# Patient Record
Sex: MALE | Race: WHITE | NOT HISPANIC OR LATINO | Employment: OTHER | URBAN - METROPOLITAN AREA
[De-identification: names, ages, dates, MRNs, and addresses within clinical notes are randomized per-mention and may not be internally consistent; named-entity substitution may affect disease eponyms.]

---

## 2017-02-24 ENCOUNTER — MYC MEDICAL ADVICE (OUTPATIENT)
Dept: FAMILY MEDICINE | Facility: CLINIC | Age: 77
End: 2017-02-24

## 2017-02-24 DIAGNOSIS — I10 HYPERTENSION GOAL BP (BLOOD PRESSURE) < 130/80: ICD-10-CM

## 2017-02-24 DIAGNOSIS — M1A.9XX0 CHRONIC GOUT WITHOUT TOPHUS, UNSPECIFIED CAUSE, UNSPECIFIED SITE: ICD-10-CM

## 2017-02-24 DIAGNOSIS — E78.5 HYPERLIPIDEMIA LDL GOAL <100: ICD-10-CM

## 2017-02-24 RX ORDER — PRAVASTATIN SODIUM 80 MG/1
80 TABLET ORAL DAILY
Qty: 90 TABLET | Refills: 0 | Status: SHIPPED | OUTPATIENT
Start: 2017-02-24 | End: 2017-03-21

## 2017-02-24 RX ORDER — LISINOPRIL 40 MG/1
40 TABLET ORAL DAILY
Qty: 90 TABLET | Refills: 0 | Status: SHIPPED | OUTPATIENT
Start: 2017-02-24 | End: 2017-03-21

## 2017-02-24 RX ORDER — ALLOPURINOL 100 MG/1
TABLET ORAL
Qty: 180 TABLET | Refills: 0 | Status: SHIPPED | OUTPATIENT
Start: 2017-02-24 | End: 2017-03-21

## 2017-02-24 NOTE — TELEPHONE ENCOUNTER
pravastatin (PRAVACHOL) 80 MG tablet    Last Written Prescription Date: 8-26-16  Last Fill Quantity: 90, # refills: 1  Last Office Visit with [facility], [facility] or  Health prescribing provider: 3-18-16  Next 5 appointments (look out 90 days)     Mar 21, 2017 10:00 AM CDT   PHYSICAL with Gaby Baker MD   [facility] ([facility])    [address]  [place] [address]   [phone]                   Lab Results   Component Value Date    CHOL 141 01/15/2016     Lab Results   Component Value Date    HDL 45 01/15/2016     Lab Results   Component Value Date    LDL 61 01/15/2016     Lab Results   Component Value Date    TRIG 173 01/15/2016     Lab Results   Component Value Date    CHOLHDLRATIO 3.6 03/10/2015     lisinopril (PRINIVIL,ZESTRIL) 40 MG tablet      Last Written Prescription Date: 1-15-16  Last Fill Quantity: 90, # refills: 4  Last Office Visit with [facility], [facility] or  Health prescribing provider: 3-18-16  Next 5 appointments (look out 90 days)     Mar 21, 2017 10:00 AM CDT   PHYSICAL with Gaby Baker MD   [facility] ([facility])    [address]  [place] [address]   [phone]                   Potassium   Date Value Ref Range Status   01/15/2016 3.8 3.4 - 5.3 mmol/L Final     Creatinine   Date Value Ref Range Status   01/15/2016 1.07 0.66 - 1.25 mg/dL Final     BP Readings from Last 3 Encounters:   12/05/16 157/85   04/18/16 138/81   03/18/16 118/68     allopurinol (ZYLOPRIM) 100 MG tablet      Last Written Prescription Date: 1-15-16  Last Fill Quantity: 180, # refills: 4  Last Office Visit with [facility], [facility] or  Health prescribing provider:  3-18-16   Next 5 appointments (look out 90 days)     Mar 21, 2017 10:00 AM CDT   PHYSICAL with Gaby Baker MD   [facility] ([facility])    [address]  [place] [address]   [phone]                   Uric Acid   Date Value Ref Range Status   01/15/2016  4.4 3.5 - 7.2 mg/dL Final   ]  Creatinine   Date Value Ref Range Status   01/15/2016 1.07 0.66 - 1.25 mg/dL Final   ]  Lab Results   Component Value Date    WBC 14.3 04/30/2013     Lab Results   Component Value Date    RBC 3.82 04/30/2013     Lab Results   Component Value Date    HGB 11.6 05/01/2013     Lab Results   Component Value Date    HCT 36.0 04/30/2013     No components found for: MCT  Lab Results   Component Value Date    MCV 94 04/30/2013     Lab Results   Component Value Date    MCH 31.2 04/30/2013     Lab Results   Component Value Date    MCHC 33.1 04/30/2013     Lab Results   Component Value Date    RDW 12.9 04/30/2013     Lab Results   Component Value Date     04/30/2013     Lab Results   Component Value Date    AST 21 01/15/2016     Lab Results   Component Value Date    ALT 42 01/15/2016

## 2017-03-21 ENCOUNTER — OFFICE VISIT (OUTPATIENT)
Dept: FAMILY MEDICINE | Facility: CLINIC | Age: 77
End: 2017-03-21
Payer: COMMERCIAL

## 2017-03-21 VITALS
DIASTOLIC BLOOD PRESSURE: 80 MMHG | TEMPERATURE: 97.2 F | OXYGEN SATURATION: 94 % | SYSTOLIC BLOOD PRESSURE: 130 MMHG | WEIGHT: 218 LBS | HEART RATE: 100 BPM | HEIGHT: 67 IN | BODY MASS INDEX: 34.21 KG/M2

## 2017-03-21 DIAGNOSIS — Z13.89 SCREENING FOR DIABETIC PERIPHERAL NEUROPATHY: ICD-10-CM

## 2017-03-21 DIAGNOSIS — I10 HYPERTENSION GOAL BP (BLOOD PRESSURE) < 130/80: ICD-10-CM

## 2017-03-21 DIAGNOSIS — Z00.00 ROUTINE GENERAL MEDICAL EXAMINATION AT A HEALTH CARE FACILITY: Primary | ICD-10-CM

## 2017-03-21 DIAGNOSIS — M1A.9XX0 CHRONIC GOUT WITHOUT TOPHUS, UNSPECIFIED CAUSE, UNSPECIFIED SITE: ICD-10-CM

## 2017-03-21 DIAGNOSIS — L30.9 DERMATITIS: ICD-10-CM

## 2017-03-21 DIAGNOSIS — E78.5 HYPERLIPIDEMIA LDL GOAL <100: ICD-10-CM

## 2017-03-21 DIAGNOSIS — I10 HTN, GOAL BELOW 130/80: ICD-10-CM

## 2017-03-21 DIAGNOSIS — E11.9 TYPE 2 DIABETES MELLITUS WITHOUT COMPLICATION, WITHOUT LONG-TERM CURRENT USE OF INSULIN (H): ICD-10-CM

## 2017-03-21 DIAGNOSIS — Z71.89 ADVANCED DIRECTIVES, COUNSELING/DISCUSSION: ICD-10-CM

## 2017-03-21 LAB
ALT SERPL W P-5'-P-CCNC: 23 U/L (ref 0–70)
ANION GAP SERPL CALCULATED.3IONS-SCNC: 6 MMOL/L (ref 3–14)
AST SERPL W P-5'-P-CCNC: 17 U/L (ref 0–45)
BUN SERPL-MCNC: 17 MG/DL (ref 7–30)
CALCIUM SERPL-MCNC: 10.4 MG/DL (ref 8.5–10.1)
CHLORIDE SERPL-SCNC: 98 MMOL/L (ref 94–109)
CHOLEST SERPL-MCNC: 158 MG/DL
CK SERPL-CCNC: 94 U/L (ref 30–300)
CO2 SERPL-SCNC: 32 MMOL/L (ref 20–32)
CREAT SERPL-MCNC: 1.04 MG/DL (ref 0.66–1.25)
CREAT UR-MCNC: 228 MG/DL
GFR SERPL CREATININE-BSD FRML MDRD: 69 ML/MIN/1.7M2
GLUCOSE SERPL-MCNC: 134 MG/DL (ref 70–99)
HBA1C MFR BLD: 6.4 % (ref 4.3–6)
HDLC SERPL-MCNC: 57 MG/DL
LDLC SERPL CALC-MCNC: 74 MG/DL
MICROALBUMIN UR-MCNC: 613 MG/L
MICROALBUMIN/CREAT UR: 268.86 MG/G CR (ref 0–17)
NONHDLC SERPL-MCNC: 101 MG/DL
POTASSIUM SERPL-SCNC: 3.8 MMOL/L (ref 3.4–5.3)
SODIUM SERPL-SCNC: 136 MMOL/L (ref 133–144)
TRIGL SERPL-MCNC: 136 MG/DL
URATE SERPL-MCNC: 4.4 MG/DL (ref 3.5–7.2)

## 2017-03-21 PROCEDURE — 80048 BASIC METABOLIC PNL TOTAL CA: CPT | Performed by: INTERNAL MEDICINE

## 2017-03-21 PROCEDURE — 82043 UR ALBUMIN QUANTITATIVE: CPT | Performed by: INTERNAL MEDICINE

## 2017-03-21 PROCEDURE — 80061 LIPID PANEL: CPT | Performed by: INTERNAL MEDICINE

## 2017-03-21 PROCEDURE — 99207 C FOOT EXAM  NO CHARGE: CPT | Mod: 25 | Performed by: INTERNAL MEDICINE

## 2017-03-21 PROCEDURE — 84550 ASSAY OF BLOOD/URIC ACID: CPT | Performed by: INTERNAL MEDICINE

## 2017-03-21 PROCEDURE — 82550 ASSAY OF CK (CPK): CPT | Performed by: INTERNAL MEDICINE

## 2017-03-21 PROCEDURE — 84460 ALANINE AMINO (ALT) (SGPT): CPT | Performed by: INTERNAL MEDICINE

## 2017-03-21 PROCEDURE — 84450 TRANSFERASE (AST) (SGOT): CPT | Performed by: INTERNAL MEDICINE

## 2017-03-21 PROCEDURE — 36415 COLL VENOUS BLD VENIPUNCTURE: CPT | Performed by: INTERNAL MEDICINE

## 2017-03-21 PROCEDURE — 83036 HEMOGLOBIN GLYCOSYLATED A1C: CPT | Performed by: INTERNAL MEDICINE

## 2017-03-21 PROCEDURE — 99397 PER PM REEVAL EST PAT 65+ YR: CPT | Performed by: INTERNAL MEDICINE

## 2017-03-21 RX ORDER — CHLORTHALIDONE 25 MG/1
25 TABLET ORAL DAILY
Qty: 90 TABLET | Refills: 4 | Status: SHIPPED | OUTPATIENT
Start: 2017-03-21 | End: 2018-04-11

## 2017-03-21 RX ORDER — ALLOPURINOL 100 MG/1
TABLET ORAL
Qty: 180 TABLET | Refills: 3 | Status: SHIPPED | OUTPATIENT
Start: 2017-03-21 | End: 2018-04-11

## 2017-03-21 RX ORDER — MOMETASONE FUROATE 1 MG/G
CREAM TOPICAL
Qty: 45 G | Refills: 3 | Status: SHIPPED | OUTPATIENT
Start: 2017-03-21 | End: 2021-03-03

## 2017-03-21 RX ORDER — PRAVASTATIN SODIUM 80 MG/1
80 TABLET ORAL DAILY
Qty: 90 TABLET | Refills: 3 | Status: SHIPPED | OUTPATIENT
Start: 2017-03-21 | End: 2018-04-11

## 2017-03-21 RX ORDER — LISINOPRIL 40 MG/1
40 TABLET ORAL DAILY
Qty: 90 TABLET | Refills: 3 | Status: SHIPPED | OUTPATIENT
Start: 2017-03-21 | End: 2018-04-11

## 2017-03-21 RX ORDER — AMMONIUM LACTATE 12 G/100G
CREAM TOPICAL 2 TIMES DAILY PRN
Qty: 385 G | Refills: 3 | Status: SHIPPED | OUTPATIENT
Start: 2017-03-21 | End: 2021-03-03

## 2017-03-21 ASSESSMENT — PAIN SCALES - GENERAL: PAINLEVEL: NO PAIN (0)

## 2017-03-21 NOTE — PROGRESS NOTES
INTERNAL MEDICINE   SUBJECTIVE:                                                            Zoey Romo is a 77 year old male who presents for Preventive Visit.      Are you in the first 12 months of your Medicare Part B coverage?  No    Healthy Habits:    Do you get at least three servings of calcium containing foods daily (dairy, green leafy vegetables, etc.)? yes    Amount of exercise or daily activities, outside of work: 3 day(s) per week    Problems taking medications regularly No    Medication side effects: No    Have you had an eye exam in the past two years? no    Do you see a dentist twice per year? yes    Do you have sleep apnea, excessive snoring or daytime drowsiness?yes, sleep apnea. Wants to discuss CPAP    COGNITIVE SCREEN  1) Repeat 3 items (Banana, Sunrise, Chair)  2) Clock draw: NORMAL  3) 3 item recall: Recalls 3 objects  Results: 3 items recalled: COGNITIVE IMPAIRMENT LESS LIKELY    Mini-CogTM Copyright S Farrukh. Licensed by the author for use in [facility]; reprinted with permission (ashu@[facility]). All rights reserved.            Chief Complaint   Patient presents with     Physical     No concerns. Requesting prescription for dry skin. Patient is fasting.         Reviewed and updated as needed this visit by clinical staff  Tobacco  Allergies  Meds  Med Hx  Surg Hx  Fam Hx  Soc Hx        Reviewed and updated as needed this visit by Provider        Social History   Substance Use Topics     Smoking status: Former Smoker     Packs/day: 0.50     Years: 10.00     Types: Cigars     Quit date: 1/20/1985     Smokeless tobacco: Never Used     Alcohol use 3.5 oz/week      Comment: 2 per month       The patient does not drink >3 drinks per day nor >7 drinks per week.    Today's PHQ-2 Score:   PHQ-2 ( 1999 [employer]) 1/15/2016 3/10/2015   Q1: Little interest or pleasure in doing things 0 0   Q2: Feeling down, depressed or hopeless 0 0   PHQ-2 Score 0 0       Do you feel safe in your  environment - Yes    Do you have a Health Care Directive?: No: Advance care planning reviewed with patient; information given to patient to review.    Current providers sharing in care for this patient include:   Patient Care Team:  Gaby Baker MD as PCP - General      Hearing impairment: No    Ability to successfully perform activities of daily living: Yes, no assistance needed     Fall risk:       Home safety:  none identified      The following health maintenance items are reviewed in Epic and correct as of today:  Health Maintenance   Topic Date Due     FOOT EXAM Q1 YEAR( NO INBASKET)  03/10/2016     FALL RISK ASSESSMENT  03/10/2016     EYE EXAM Q1 YEAR( NO INBASKET)  06/16/2016     A1C Q6 MO( NO INBASKET)  11/10/2016     BMP Q1 YR (NO INBASKET)  01/15/2017     LIPID MONITORING Q1 YEAR( NO INBASKET)  01/15/2017     MICROALBUMIN Q1 YEAR( NO INBASKET)  03/18/2017     INFLUENZA VACCINE (SYSTEM ASSIGNED)  09/01/2017     TSH W/ FREE T4 REFLEX Q2 YEAR (NO INBASKET)  01/15/2018     COLONOSCOPY Q3 YR INBASKET MESSAGE  05/03/2019     ADVANCE DIRECTIVE PLANNING Q5 YRS (NO INBASKET)  03/18/2021     TETANUS IMMUNIZATION (SYSTEM ASSIGNED)  02/01/2023     PNEUMOCOCCAL  Completed       Additional Notes: He does not have any health concerns. He was evaluated by cardiology for his AAA, which is stable. In the morning his blood sugars are around 120. This tends to go down throughout the day. He is still not interested in getting the shingles vaccine. He would like to get some more of the salve he was using for the rash on the skin on his ankles       ROS:  C: NEGATIVE for fever, chills, change in weight  I: NEGATIVE for worrisome rashes, moles or lesions  CV: NEGATIVE for chest pain, palpitations or peripheral edema  M: NEGATIVE for significant arthralgias or myalgia  P: NEGATIVE for changes in mood or affect  All other systems reviewed and were negative.      This document serves as a record of the services and decisions  "personally performed and made by Gaby Bkaer MD. It was created on his/her behalf by Hermila Ontiveros, a trained medical scribe. The creation of this document is based the provider's statements to the medical scribe.    Fern Ontiveros 10:16 AM, March 21, 2017    Problem list, Medication list, Allergies, and Medical/Social/Surgical histories reviewed in [facility] and updated as appropriate.  Labs reviewed in EPIC  OBJECTIVE:                                                            /80 (BP Location: Right arm, Cuff Size: Adult Regular)  Pulse 100  Temp 97.2  F (36.2  C) (Oral)  Ht 5' 7.25\" (1.708 m)  Wt 218 lb (98.9 kg)  SpO2 94%  BMI 33.89 kg/m2 Estimated body mass index is 33.89 kg/(m^2) as calculated from the following:    Height as of this encounter: 5' 7.25\" (1.708 m).    Weight as of this encounter: 218 lb (98.9 kg).  EXAM:   GENERAL: healthy, alert and no distress  HENT: ear canals and TM's normal, nose and mouth without ulcers or lesions  NECK: no adenopathy, no asymmetry, masses, or scars and thyroid normal to palpation  RESP: lungs clear to auscultation - no rales, rhonchi or wheezes  CV: regular rate and rhythm, normal S1 S2, no S3 or S4, no murmur, click or rub, no peripheral edema and peripheral pulses strong  ABDOMEN: soft, nontender, no hepatosplenomegaly, no masses and bowel sounds normal, diastasis recti   MS: no gross musculoskeletal defects noted, no edema  SKIN: no suspicious lesions or rashes, dry scaly legs bilaterally.   PSYCH: mentation appears normal, affect normal/bright  DIABETIC FOOT EXAM: Normal sensation bilaterally with no visible ulcerations and intact circulation     ASSESSMENT / PLAN:                                                            I spent 19 minutes of time with the patient and >50% of it was in education and counseling regarding preventive healthcare.     1. Chronic gout without tophus, unspecified cause, unspecified site   The current medical regimen is " effective; continue present plan and medications    - allopurinol (ZYLOPRIM) 100 MG tablet; TAKE 1 TABLET (100 MG) BY MOUTH 2 TIMES DAILY  Dispense: 180 tablet; Refill: 3  - Uric acid    2. HTN, goal below 130/80   The current medical regimen is effective; continue present plan and medications    - diltiazem HCl COATED BEADS (CARDIZEM LA) 240 MG TB24; Take 240 mg by mouth daily  Dispense: 90 tablet; Refill: 4  - chlorthalidone (HYGROTON) 25 MG tablet; Take 1 tablet (25 mg) by mouth daily  Dispense: 90 tablet; Refill: 4  - BASIC METABOLIC PANEL    3. Hypertension goal BP (blood pressure) < 130/80   The current medical regimen is effective; continue present plan and medications    - lisinopril (PRINIVIL/ZESTRIL) 40 MG tablet; Take 1 tablet (40 mg) by mouth daily  Dispense: 90 tablet; Refill: 3    4. Hyperlipidemia LDL goal <100   The current medical regimen is effective; continue present plan and medications    - pravastatin (PRAVACHOL) 80 MG tablet; Take 1 tablet (80 mg) by mouth daily  Dispense: 90 tablet; Refill: 3  - Lipid panel reflex to direct LDL  - AST  - ALT  - CK total    5. Screening for diabetic peripheral neuropathy   Normal.   - FOOT EXAM  NO CHARGE [32126.114]    6. Routine general medical examination at a health care facility   Performed today in clinic     7. Advanced directives, counseling/discussion   Discussed with patient. He will attend session with his wife to complete advanced directive.   - HONORING CHOICES REFERRAL    8. Type 2 diabetes mellitus without complication, without long-term current use of insulin (H)   The current medical regimen is effective; continue present plan and medications    - metFORMIN (GLUCOPHAGE) 1000 MG tablet; Take 1 tablet (1,000 mg) by mouth 2 times daily (with meals)  Dispense: 180 tablet; Refill: 3  - blood glucose monitoring (GOOD CONTOUR) test strip; 1 strip by In Vitro route daily Use to test blood sugars  daily or as directed.  Dispense: 1 Box; Refill: 11  -  "HEMOGLOBIN A1C  - Albumin Random Urine Quantitative    9. Dermatitis   The current medical regimen is effective; continue present plan and medications    - mometasone (ELOCON) 0.1 % cream; Apply sparingly to affected area twice daily as needed.  Do not apply to face.  Dispense: 45 g; Refill: 3  - ammonium lactate (LAC-HYDRIN) 12 % cream; Apply topically 2 times daily as needed for dry skin  Dispense: 385 g; Refill: 3    End of Life Planning:  Patient currently has an advanced directive: No.  I have verified the patient's ablity to prepare an advanced directive/make health care decisions.  Literature was provided to assist patient in preparing an advanced directive.    COUNSELING:  Reviewed preventive health counseling, as reflected in patient instructions       Preparing Advanced Directive.   Estimated body mass index is 33.89 kg/(m^2) as calculated from the following:    Height as of this encounter: 5' 7.25\" (1.708 m).    Weight as of this encounter: 218 lb (98.9 kg).  Weight management plan: Discussed healthy diet and exercise guidelines and patient will follow up in 12 months in clinic to re-evaluate.   reports that he quit smoking about 32 years ago. His smoking use included Cigars. He has a 5.00 pack-year smoking history. He has never used smokeless tobacco.      Appropriate preventive services were discussed with this patient, including applicable screening as appropriate for cardiovascular disease, diabetes, osteopenia/osteoporosis, and glaucoma.  As appropriate for age/gender, discussed screening for colorectal cancer, prostate cancer, breast cancer, and cervical cancer. Checklist reviewing preventive services available has been given to the patient.    Reviewed patients plan of care and provided an AVS. The Basic Care Plan (routine screening as documented in Health Maintenance) for Zoey meets the Care Plan requirement. This Care Plan has been established and reviewed with the Patient.    Counseling " Resources:  ATP IV Guidelines  Pooled Cohorts Equation Calculator  Breast Cancer Risk Calculator  FRAX Risk Assessment  ICSI Preventive Guidelines  Dietary Guidelines for Americans, 2010  USDA's MyPlate  ASA Prophylaxis  Lung CA Screening    Patient Instructions   - Honoring Choices is a free resource to help you organize your Advanced Directive.   - Start using urea cream on legs after you get out of the shower for dry skin. Use the Elocon cream twice daily as needed for when the rash gets severe.  - Schedule next appointment me in 6 months.       The information in this document, created by the medical scribe for me, accurately reflects the services I personally performed and the decisions made by me. I have reviewed and approved this document for accuracy prior to leaving the patient care area.  Gaby Baker MD  10:19 AM, 03/21/17    Gaby Baker MD  [facility]    Start: 10:19 AM   End: 10:38 AM

## 2017-03-21 NOTE — PATIENT INSTRUCTIONS
- Honoring Choices is a free resource to help you organize your Advanced Directive.     - Start using urea cream on legs after you get out of the shower for dry skin. Use the Elocon cream twice daily as needed for when the rash gets severe.    - Schedule next appointment me in 6 months.       Preventive Health Recommendations:   Male Ages 65 and over    Yearly exam:             See your health care provider every year in order to  o   Review health changes.   o   Discuss preventive care.    o   Review your medicines if your doctor has prescribed any.    Talk with your health care provider about whether you should have a test to screen for prostate cancer (PSA).    Every 3 years, have a diabetes test (fasting glucose). If you are at risk for diabetes, you should have this test more often.    Every 5 years, have a cholesterol test. Have this test more often if you are at risk for high cholesterol or heart disease.     Every 10 years, have a colonoscopy. Or, have a yearly FIT test (stool test). These exams will check for colon cancer.    Talk to with your health care provider about screening for Abdominal Aortic Aneurysm if you have a family history of AAA or have a history of smoking.    Shots:     Get a flu shot each year.     Get a tetanus shot every 10 years.     Talk to your doctor about your pneumonia vaccines. There are now two you should receive - Pneumovax (PPSV 23) and Prevnar (PCV 13).     Talk to your doctor about a shingles vaccine.     Talk to your doctor about the hepatitis B vaccine.  Nutrition:     Eat at least 5 servings of fruits and vegetables each day.     Eat whole-grain bread, whole-wheat pasta and brown rice instead of white grains and rice.     Talk to your provider about Calcium and Vitamin D.   Lifestyle    Exercise for at least 150 minutes a week (30 minutes a day, 5 days a week). This will help you control your weight and prevent disease.     Limit alcohol to one drink per day.     No  smoking.     Wear sunscreen to prevent skin cancer.     See your dentist every six months for an exam and cleaning.     See your eye doctor every 1 to 2 years to screen for conditions such as glaucoma, macular degeneration, cataracts, etc       [facility]    If you have any questions regarding to your visit please contact your care team:     Team Pink:   Clinic Hours Telephone Number   Internal Medicine:  Dr. Gaby Denise, NP       7am-7pm  Monday - Thursday   7am-5pm  Fridays  [phone]  (Appointment scheduling available 24/7)    Questions about your visit?  Team Line  [phone]   Urgent Care - [place] and [place] - 11am-9pm Monday-Friday Saturday-Sunday- 9am-5pm   [place] - 5pm-9pm Monday-Friday Saturday-Sunday- 9am-5pm  [phone] - [place]   [phone] - [place]       What options do I have for visits at the clinic other than the traditional office visit?  To expand how we care for you, many of our providers are utilizing electronic visits (e-visits) and telephone visits, when medically appropriate, for interactions with their patients rather than a visit in the clinic.   We also offer nurse visits for many medical concerns. Just like any other service, we will bill your insurance company for this type of visit based on time spent on the phone with your provider. Not all insurance companies cover these visits. Please check with your medical insurance if this type of visit is covered. You will be responsible for any charges that are not paid by your insurance.      E-visits via Flywheel:  generally incur a $35.00 fee.  Telephone visits:  Time spent on the phone: *charged based on time that is spent on the phone in increments of 10 minutes. Estimated cost:   5-10 mins $30.00   11-20 mins. $59.00   21-30 mins. $85.00   Use Flywheel (secure email communication and access to your chart) to send your primary care provider a message  or make an appointment. Ask someone on your Team how to sign up for [employer].    For a Price Quote for your services, please call our Consumer Price Line at [phone].    As always, Thank you for trusting us with your health care needs!    Discharged by Suze RAM CMA ([facility])

## 2017-03-21 NOTE — MR AVS SNAPSHOT
After Visit Summary   3/21/2017    Zoey Romo    MRN: [id number]           Patient Information     Date Of Birth          1940        Visit Information        Provider Department      3/21/2017 10:00 AM Gaby Baker MD [facility]        Today's Diagnoses     Routine general medical examination at a health care facility    -  1    Chronic gout without tophus, unspecified cause, unspecified site        HTN, goal below 130/80        Hypertension goal BP (blood pressure) < 130/80        Hyperlipidemia LDL goal <100        Screening for diabetic peripheral neuropathy        Advanced directives, counseling/discussion        Type 2 diabetes mellitus without complication, without long-term current use of insulin (H)        Dermatitis          Care Instructions    - Honoring Choices is a free resource to help you organize your Advanced Directive.     - Start using urea cream on legs after you get out of the shower for dry skin. Use the Elocon cream twice daily as needed for when the rash gets severe.    - Schedule next appointment me in 6 months.       Preventive Health Recommendations:   Male Ages 65 and over    Yearly exam:             See your health care provider every year in order to  o   Review health changes.   o   Discuss preventive care.    o   Review your medicines if your doctor has prescribed any.    Talk with your health care provider about whether you should have a test to screen for prostate cancer (PSA).    Every 3 years, have a diabetes test (fasting glucose). If you are at risk for diabetes, you should have this test more often.    Every 5 years, have a cholesterol test. Have this test more often if you are at risk for high cholesterol or heart disease.     Every 10 years, have a colonoscopy. Or, have a yearly FIT test (stool test). These exams will check for colon cancer.    Talk to with your health care provider about screening for Abdominal Aortic Aneurysm if  you have a family history of AAA or have a history of smoking.    Shots:     Get a flu shot each year.     Get a tetanus shot every 10 years.     Talk to your doctor about your pneumonia vaccines. There are now two you should receive - Pneumovax (PPSV 23) and Prevnar (PCV 13).     Talk to your doctor about a shingles vaccine.     Talk to your doctor about the hepatitis B vaccine.  Nutrition:     Eat at least 5 servings of fruits and vegetables each day.     Eat whole-grain bread, whole-wheat pasta and brown rice instead of white grains and rice.     Talk to your provider about Calcium and Vitamin D.   Lifestyle    Exercise for at least 150 minutes a week (30 minutes a day, 5 days a week). This will help you control your weight and prevent disease.     Limit alcohol to one drink per day.     No smoking.     Wear sunscreen to prevent skin cancer.     See your dentist every six months for an exam and cleaning.     See your eye doctor every 1 to 2 years to screen for conditions such as glaucoma, macular degeneration, cataracts, etc       [facility]    If you have any questions regarding to your visit please contact your care team:     Team Pink:   Clinic Hours Telephone Number   Internal Medicine:  Dr. Gaby Denise, NP       7am-7pm  Monday - Thursday   7am-5pm  Fridays  [phone]  (Appointment scheduling available 24/7)    Questions about your visit?  Team Line  [phone]   Urgent Care - [place] - 11am-9pm Monday-Friday Saturday-Sunday- 9am-5pm   [place] - 5pm-9pm Monday-Friday Saturday-Sunday- 9am-5pm  [phone] - [place]   [phone] - [place]       What options do I have for visits at the clinic other than the traditional office visit?  To expand how we care for you, many of our providers are utilizing electronic visits (e-visits) and telephone visits, when medically appropriate, for interactions with their patients  rather than a visit in the clinic.   We also offer nurse visits for many medical concerns. Just like any other service, we will bill your insurance company for this type of visit based on time spent on the phone with your provider. Not all insurance companies cover these visits. Please check with your medical insurance if this type of visit is covered. You will be responsible for any charges that are not paid by your insurance.      E-visits via YouWebhart:  generally incur a $35.00 fee.  Telephone visits:  Time spent on the phone: *charged based on time that is spent on the phone in increments of 10 minutes. Estimated cost:   5-10 mins $30.00   11-20 mins. $59.00   21-30 mins. $85.00   Use ADOMIC (formerly YieldMetrics) (secure email communication and access to your chart) to send your primary care provider a message or make an appointment. Ask someone on your Team how to sign up for ADOMIC (formerly YieldMetrics).    For a Price Quote for your services, please call our [employer] Line at [phone].    As always, Thank you for trusting us with your health care needs!    Discharged by Suze RAM CMA ([facility])          Follow-ups after your visit        Additional Services     HONORING CHOICES REFERRAL       Your provider has referred you to Advance Directive Counseling with our [facility] [employer] Program.    Reason for Referral: Facilitate General Advance Care Planning    The [facility]oring [employer] Representative will call you to schedule your appointment, unless your appointment was already scheduled at your clinic.  You may also call the Peer[employer]ing [employer] [facility] Representative at [phone] to schedule your appointment.                  Who to contact     If you have questions or need follow up information about today's clinic visit or your schedule please contact [facility] directly at [phone].  Normal or non-critical lab and imaging results will be communicated to you by MyChart, letter or phone within 4 business days after the clinic has received  "the results. If you do not hear from us within 7 days, please contact the clinic through Neomatrix or phone. If you have a critical or abnormal lab result, we will notify you by phone as soon as possible.  Submit refill requests through Neomatrix or call your pharmacy and they will forward the refill request to us. Please allow 3 business days for your refill to be completed.          Additional Information About Your Visit        The [employer] Information     Neomatrix gives you secure access to your electronic health record. If you see a primary care provider, you can also send messages to your care team and make appointments. If you have questions, please call your primary care clinic.  If you do not have a primary care provider, please call [phone] and they will assist you.        Care EveryWhere ID     This is your Care EveryWhere ID. This could be used by other organizations to access your [place] medical records  [id number]        Your Vitals Were     Pulse Temperature Height Pulse Oximetry BMI (Body Mass Index)       100 97.2  F (36.2  C) (Oral) 5' 7.25\" (1.708 m) 94% 33.89 kg/m2        Blood Pressure from Last 3 Encounters:   03/21/17 130/80   12/05/16 157/85   04/18/16 138/81    Weight from Last 3 Encounters:   03/21/17 218 lb (98.9 kg)   12/05/16 223 lb (101.2 kg)   04/18/16 224 lb (101.6 kg)              We Performed the Following     Albumin Random Urine Quantitative     ALT     AST     BASIC METABOLIC PANEL     CK total     FOOT EXAM  NO CHARGE [37413.114]     HEMOGLOBIN A1C     HONORING CHOICES REFERRAL     Lipid panel reflex to direct LDL     Uric acid          Today's Medication Changes          These changes are accurate as of: 3/21/17 10:43 AM.  If you have any questions, ask your nurse or doctor.               Start taking these medicines.        Dose/Directions    ammonium lactate 12 % cream   Commonly known as:  LAC-HYDRIN   Used for:  Dermatitis   Started by:  Gaby Baker MD        Apply " topically 2 times daily as needed for dry skin   Quantity:  385 g   Refills:  3       mometasone 0.1 % cream   Commonly known as:  ELOCON   Used for:  Dermatitis   Started by:  Gaby Baker MD        Apply sparingly to affected area twice daily as needed.  Do not apply to face.   Quantity:  45 g   Refills:  3            Where to get your medicines      These medications were sent to [facility] - [address]  [address] [place] [id number]     Phone:  [phone]     allopurinol 100 MG tablet    ammonium lactate 12 % cream    blood glucose monitoring test strip    chlorthalidone 25 MG tablet    diltiazem HCl COATED BEADS 240 MG Tb24    lisinopril 40 MG tablet    metFORMIN 1000 MG tablet    mometasone 0.1 % cream    pravastatin 80 MG tablet                Primary Care Provider Office Phone # Fax #    Gaby Baker [phone]       [address] [address]        Thank you!     Thank you for choosing [facility]  for your care. Our goal is always to provide you with excellent care. Hearing back from our patients is one way we can continue to improve our services. Please take a few minutes to complete the written survey that you may receive in the mail after your visit with us. Thank you!             Your Updated Medication List - Protect others around you: Learn how to safely use, store and throw away your medicines at www.disposemymeds.org.          This list is accurate as of: 3/21/17 10:43 AM.  Always use your most recent med list.                   Brand Name Dispense Instructions for use    allopurinol 100 MG tablet    ZYLOPRIM    180 tablet    TAKE 1 TABLET (100 MG) BY MOUTH 2 TIMES DAILY       ammonium lactate 12 % cream    LAC-HYDRIN    385 g    Apply topically 2 times daily as needed for dry skin       aspirin 81 MG tablet      Take 1 tablet by mouth daily.       blood glucose  monitoring test strip    GOOD CONTOUR    1 Box    1 strip by In Vitro route daily Use to test blood sugars  daily or as directed.       CALTRATE 600 + D 600-200 MG-IU Tabs      1 TABLET DAILY       chlorthalidone 25 MG tablet    HYGROTON    90 tablet    Take 1 tablet (25 mg) by mouth daily       diltiazem HCl COATED BEADS 240 MG Tb24    CARDIZEM LA    90 tablet    Take 240 mg by mouth daily       GLUCOSAMINE CHONDROITIN COMPLX PO          lisinopril 40 MG tablet    PRINIVIL/ZESTRIL    90 tablet    Take 1 tablet (40 mg) by mouth daily       metFORMIN 1000 MG tablet    GLUCOPHAGE    180 tablet    Take 1 tablet (1,000 mg) by mouth 2 times daily (with meals)       mometasone 0.1 % cream    ELOCON    45 g    Apply sparingly to affected area twice daily as needed.  Do not apply to face.       pravastatin 80 MG tablet    PRAVACHOL    90 tablet    Take 1 tablet (80 mg) by mouth daily

## 2017-03-21 NOTE — PROGRESS NOTES
There is more protein in the urine but you are taking the treatment (Lisinopril).  I don't feel a higher dose would be of any help.  I will recheck this in 6 months when I see you back.  Good 3 month sugar average.   Normal electrolytes. Normal kidney function. Good cholesterol. Normal muscle test. Normal uric acid. Normal liver blood test.

## 2017-03-21 NOTE — NURSING NOTE
"Chief Complaint   Patient presents with     Physical     No concerns. Requesting prescription for dry skin. Patient is fasting.       Initial /80 (BP Location: Right arm, Cuff Size: Adult Regular)  Pulse 100  Temp 97.2  F (36.2  C) (Oral)  Ht 5' 7.25\" (1.708 m)  Wt 218 lb (98.9 kg)  SpO2 94%  BMI 33.89 kg/m2 Estimated body mass index is 33.89 kg/(m^2) as calculated from the following:    Height as of this encounter: 5' 7.25\" (1.708 m).    Weight as of this encounter: 218 lb (98.9 kg).  Medication Reconciliation: complete       Anita Polanco CMA      "

## 2017-03-22 ASSESSMENT — PATIENT HEALTH QUESTIONNAIRE - PHQ9: SUM OF ALL RESPONSES TO PHQ QUESTIONS 1-9: 0

## 2017-05-03 ENCOUNTER — OFFICE VISIT (OUTPATIENT)
Dept: OPHTHALMOLOGY | Facility: CLINIC | Age: 77
End: 2017-05-03
Payer: COMMERCIAL

## 2017-05-03 DIAGNOSIS — Z01.01 ENCOUNTER FOR EXAMINATION OF EYES AND VISION WITH ABNORMAL FINDINGS: Primary | ICD-10-CM

## 2017-05-03 DIAGNOSIS — H02.839 DERMATOCHALASIS, UNSPECIFIED LATERALITY: ICD-10-CM

## 2017-05-03 DIAGNOSIS — H26.9 CATARACT: ICD-10-CM

## 2017-05-03 DIAGNOSIS — H52.4 PRESBYOPIA: ICD-10-CM

## 2017-05-03 DIAGNOSIS — E11.9 TYPE 2 DIABETES MELLITUS WITHOUT COMPLICATION, WITHOUT LONG-TERM CURRENT USE OF INSULIN (H): ICD-10-CM

## 2017-05-03 PROCEDURE — 92015 DETERMINE REFRACTIVE STATE: CPT | Performed by: STUDENT IN AN ORGANIZED HEALTH CARE EDUCATION/TRAINING PROGRAM

## 2017-05-03 PROCEDURE — 92014 COMPRE OPH EXAM EST PT 1/>: CPT | Performed by: STUDENT IN AN ORGANIZED HEALTH CARE EDUCATION/TRAINING PROGRAM

## 2017-05-03 ASSESSMENT — TONOMETRY
IOP_METHOD: APPLANATION
OD_IOP_MMHG: 20
OS_IOP_MMHG: 18

## 2017-05-03 ASSESSMENT — REFRACTION_WEARINGRX
SPECS_TYPE: OTC READERS
OS_SPHERE: +2.50
OS_CYLINDER: SPHERE
OD_SPHERE: +2.50
OD_CYLINDER: SPHERE

## 2017-05-03 ASSESSMENT — EXTERNAL EXAM - RIGHT EYE: OD_EXAM: NORMAL

## 2017-05-03 ASSESSMENT — REFRACTION_MANIFEST
OD_AXIS: 160
OD_ADD: +2.75
OS_SPHERE: -0.75
OS_AXIS: 030
OS_CYLINDER: +1.50
OD_CYLINDER: +1.00
OD_SPHERE: -0.25
OS_ADD: +2.75

## 2017-05-03 ASSESSMENT — VISUAL ACUITY
OS_SC: 20/30
METHOD: SNELLEN - LINEAR
OD_SC: 20/25
OS_SC+: -1+2
OD_SC+: -3

## 2017-05-03 ASSESSMENT — CONF VISUAL FIELD
OD_NORMAL: 1
OS_NORMAL: 1

## 2017-05-03 ASSESSMENT — CUP TO DISC RATIO
OS_RATIO: 0.4
OD_RATIO: 0.4

## 2017-05-03 ASSESSMENT — EXTERNAL EXAM - LEFT EYE: OS_EXAM: NORMAL

## 2017-05-03 NOTE — PATIENT INSTRUCTIONS
Glasses prescription given - optional     Johnny Cowan MD  [phone]    Diabetes weakens the blood vessels all over the body, including the eyes. Damage to the blood vessels in the eyes can cause swelling or bleeding into part of the eye (called the retina). This is called diabetic retinopathy (LEANNE-tin--puh-thee). If not treated, this disease can cause vision loss or blindness.   Symptoms may include blurred or distorted vision, but many people have no symptoms. It's important to see your eye doctor regularly to check for problems.   Early treatment and good control can help protect your vision. Here are the things you can do to help prevent vision loss:      1. Keep your blood sugar levels under tight control.      2. Bring high blood pressure under control.      3. No smoking.      4. Have yearly dilated eye exams.

## 2017-05-03 NOTE — MR AVS SNAPSHOT
After Visit Summary   5/3/2017    Zoey Romo    MRN: [id number]           Patient Information     Date Of Birth          1940        Visit Information        Provider Department      5/3/2017 9:30 AM Johnny Cowan MD [facility] [place]        Today's Diagnoses     Encounter for examination of eyes and vision with abnormal findings    -  1    Presbyopia        Type 2 diabetes mellitus without complication, without long-term current use of insulin (H)        Cataract OU        Dermatochalasis, unspecified laterality          Care Instructions    Glasses prescription given - optional     Johnny Cowan MD  [phone]    Diabetes weakens the blood vessels all over the body, including the eyes. Damage to the blood vessels in the eyes can cause swelling or bleeding into part of the eye (called the retina). This is called diabetic retinopathy (LEANNE-tin--pu-thee). If not treated, this disease can cause vision loss or blindness.   Symptoms may include blurred or distorted vision, but many people have no symptoms. It's important to see your eye doctor regularly to check for problems.   Early treatment and good control can help protect your vision. Here are the things you can do to help prevent vision loss:      1. Keep your blood sugar levels under tight control.      2. Bring high blood pressure under control.      3. No smoking.      4. Have yearly dilated eye exams.         Follow-ups after your visit        Follow-up notes from your care team     Return in about 1 year (around 5/3/2018) for Complete Exam.      Who to contact     If you have questions or need follow up information about today's clinic visit or your schedule please contact [facility] directly at [phone].  Normal or non-critical lab and imaging results will be communicated to you by MyChart, letter or phone within 4 business days after the clinic has received the results. If you do not hear  from us within 7 days, please contact the clinic through Sisasa or phone. If you have a critical or abnormal lab result, we will notify you by phone as soon as possible.  Submit refill requests through Sisasa or call your pharmacy and they will forward the refill request to us. Please allow 3 business days for your refill to be completed.          Additional Information About Your Visit        [employer] Information     Sisasa gives you secure access to your electronic health record. If you see a primary care provider, you can also send messages to your care team and make appointments. If you have questions, please call your primary care clinic.  If you do not have a primary care provider, please call [phone] and they will assist you.        Care EveryWhere ID     This is your Care EveryWhere ID. This could be used by other organizations to access your [employer] medical records  [id number]         Blood Pressure from Last 3 Encounters:   03/21/17 130/80   12/05/16 157/85   04/18/16 138/81    Weight from Last 3 Encounters:   03/21/17 98.9 kg (218 lb)   12/05/16 101.2 kg (223 lb)   04/18/16 101.6 kg (224 lb)              Today, you had the following     No orders found for display       Primary Care Provider Office Phone # Fax #    Gaby Baker [phone]       [address] [address]        Thank you!     Thank you for choosing [facility]  for your care. Our goal is always to provide you with excellent care. Hearing back from our patients is one way we can continue to improve our services. Please take a few minutes to complete the written survey that you may receive in the mail after your visit with us. Thank you!             Your Updated Medication List - Protect others around you: Learn how to safely use, store and throw away your medicines at www.disposemymeds.org.          This list is accurate as of: 5/3/17 10:13 AM.  Always use  your most recent med list.                   Brand Name Dispense Instructions for use    allopurinol 100 MG tablet    ZYLOPRIM    180 tablet    TAKE 1 TABLET (100 MG) BY MOUTH 2 TIMES DAILY       ammonium lactate 12 % cream    LAC-HYDRIN    385 g    Apply topically 2 times daily as needed for dry skin       aspirin 81 MG tablet      Take 1 tablet by mouth daily.       blood glucose monitoring test strip    L-3 GCS CONTOUR    1 Box    1 strip by In Vitro route daily Use to test blood sugars  daily or as directed.       CALTRATE 600 + D 600-200 MG-IU Tabs      1 TABLET DAILY       chlorthalidone 25 MG tablet    HYGROTON    90 tablet    Take 1 tablet (25 mg) by mouth daily       diltiazem HCl COATED BEADS 240 MG Tb24    CARDIZEM LA    90 tablet    Take 240 mg by mouth daily       GLUCOSAMINE CHONDROITIN COMPLX PO          lisinopril 40 MG tablet    PRINIVIL/ZESTRIL    90 tablet    Take 1 tablet (40 mg) by mouth daily       metFORMIN 1000 MG tablet    GLUCOPHAGE    180 tablet    Take 1 tablet (1,000 mg) by mouth 2 times daily (with meals)       mometasone 0.1 % cream    ELOCON    45 g    Apply sparingly to affected area twice daily as needed.  Do not apply to face.       pravastatin 80 MG tablet    PRAVACHOL    90 tablet    Take 1 tablet (80 mg) by mouth daily

## 2017-05-03 NOTE — PROGRESS NOTES
Current Eye Medications:  None     Subjective:  Here for complete today. DM, last a1c was 6.4 on 3-21-17.  No flashes or floaters, no vision changes. Wife has macular problems, has shots.     Objective:  See Ophthalmology Exam.      Assessment:  Zoey Romo is a 77 year old male who presents with:     Type 2 diabetes mellitus without complication, without long-term current use of insulin (H) Negative diabetic retinopathy      Cataract OU Not visually significant      Dermatochalasis, unspecified laterality        Plan:  Glasses prescription given - optional     Johnny Cowan MD  [phone]

## 2017-05-15 ENCOUNTER — RADIANT APPOINTMENT (OUTPATIENT)
Dept: GENERAL RADIOLOGY | Facility: CLINIC | Age: 77
End: 2017-05-15
Attending: INTERNAL MEDICINE
Payer: COMMERCIAL

## 2017-05-15 ENCOUNTER — OFFICE VISIT (OUTPATIENT)
Dept: FAMILY MEDICINE | Facility: CLINIC | Age: 77
End: 2017-05-15
Payer: COMMERCIAL

## 2017-05-15 ENCOUNTER — TELEPHONE (OUTPATIENT)
Dept: FAMILY MEDICINE | Facility: CLINIC | Age: 77
End: 2017-05-15

## 2017-05-15 VITALS
WEIGHT: 225 LBS | OXYGEN SATURATION: 100 % | TEMPERATURE: 98.5 F | DIASTOLIC BLOOD PRESSURE: 66 MMHG | BODY MASS INDEX: 34.98 KG/M2 | SYSTOLIC BLOOD PRESSURE: 136 MMHG | HEART RATE: 97 BPM

## 2017-05-15 DIAGNOSIS — S00.83XA TRAUMATIC HEMATOMA OF FACE, INITIAL ENCOUNTER: Primary | ICD-10-CM

## 2017-05-15 PROCEDURE — 70150 X-RAY EXAM OF FACIAL BONES: CPT

## 2017-05-15 PROCEDURE — 99213 OFFICE O/P EST LOW 20 MIN: CPT | Performed by: INTERNAL MEDICINE

## 2017-05-15 ASSESSMENT — PAIN SCALES - GENERAL: PAINLEVEL: NO PAIN (0)

## 2017-05-15 NOTE — NURSING NOTE
"Chief Complaint   Patient presents with     Fall     5/14/2017  injury to right eye       Initial /66  Pulse 97  Temp 98.5  F (36.9  C) (Oral)  Wt 225 lb (102.1 kg)  SpO2 100%  BMI 34.98 kg/m2 Estimated body mass index is 34.98 kg/(m^2) as calculated from the following:    Height as of 3/21/17: 5' 7.25\" (1.708 m).    Weight as of this encounter: 225 lb (102.1 kg).  Medication Reconciliation: complete   Herlinda Guadarrama MA    "

## 2017-05-15 NOTE — PATIENT INSTRUCTIONS
- You can use ice on the area to control pain and swelling.  - The blood will travel down your face. This is normal.   - If the swelling does not go away, let us know.     [facility]    If you have any questions regarding to your visit please contact your care team:     Team Pink:   Clinic Hours Telephone Number   Internal Medicine:  Dr. Gaby Denise NP       7am-7pm  Monday - Thursday   7am-5pm  Fridays  [phone]  (Appointment scheduling available 24/7)    Questions about your visit?  Team Line  [phone]   [facility] [place] [facility] [place] [facility] 11am-9pm Monday-Friday Saturday-Sunday- 9am-5pm   [place] - 5pm-9pm Monday-Friday Saturday-Sunday- 9am-5pm  [phone] - [place]   [phone] - [place]       What options do I have for visits at the clinic other than the traditional office visit?  To expand how we care for you, many of our providers are utilizing electronic visits (e-visits) and telephone visits, when medically appropriate, for interactions with their patients rather than a visit in the clinic.   We also offer nurse visits for many medical concerns. Just like any other service, we will bill your insurance company for this type of visit based on time spent on the phone with your provider. Not all insurance companies cover these visits. Please check with your medical insurance if this type of visit is covered. You will be responsible for any charges that are not paid by your insurance.      E-visits via eLama:  generally incur a $35.00 fee.  Telephone visits:  Time spent on the phone: *charged based on time that is spent on the phone in increments of 10 minutes. Estimated cost:   5-10 mins $30.00   11-20 mins. $59.00   21-30 mins. $85.00   Use eLama (secure email communication and access to your chart) to send your primary care provider a message or make an appointment. Ask someone on your Team how to sign up for  Nel.    For a Price Quote for your services, please call our Consumer Price Line at [phone].    As always, Thank you for trusting us with your health care needs!    Discharged By:  JAYDEN JENNINGS

## 2017-05-15 NOTE — TELEPHONE ENCOUNTER
"Spoke with patient's spouse.  Patient fell yesterday and has facial injury.  Denies any symptoms of concussion   \"he looks pretty bad, maybe need an xray or something.\"  Appointment made for today at 2:45Pm with Dr. Samuel Godwin, RN    "

## 2017-05-15 NOTE — PROGRESS NOTES
INTERNAL MEDICINE   SUBJECTIVE:                                                    Zoey Romo is a 77 year old male who presents to clinic today for the following health issues:  F/U Fall      Fall: While he was trimming a tree top with a chainsaw, he had a misstep and fell over. He adds that the tree was on the ground and had already been cut down. This happened yesterday. He feels that he must not have picked up his foot enough, which caused him to fall. The area of impact was over his right eye. He is able to see out of the eye without any blurred vision. There was no loss of consciousness and he did not see any starts. He denies any headache, or vomiting. He notes that he was a little confused, but no more than he usually is. He did not go into the ER for this.     He adds that there has also been some pain throughout his chest which he thinks might have been related to how he fell.     He is accompanied today in clinic by his wife.       Problem list and histories reviewed & adjusted, as indicated.  Additional history: as documented    Patient Active Problem List   Diagnosis     GERD (gastroesophageal reflux disease)     HYPERLIPIDEMIA LDL GOAL <100     AAA (abdominal aortic aneurysm) (H)     Hypertension goal BP (blood pressure) < 130/80     Dermatochalasis OU     NORMA (obstructive sleep apnea)     Advanced directives, counseling/discussion     Gout     Cataract OU     Lumbar stenosis     Spondylolisthesis of lumbar region     Fusion of spine     Eczema     CKD (chronic kidney disease) stage 2, GFR 60-89 ml/min     Hypercalcemia     Type 2 diabetes mellitus with diabetic nephropathy (H)     Gastroesophageal reflux disease without esophagitis     Chronic gout without tophus, unspecified cause, unspecified site     Abdominal aortic aneurysm without rupture (H)     HTN, goal below 130/80     Epistaxis     Type 2 diabetes mellitus without complication, without long-term current use of insulin (H)     Past  Surgical History:   Procedure Laterality Date     BIOPSY       COLONOSCOPY       OPTICAL TRACKING SYSTEM FUSION POSTERIOR SPINE LUMBAR  4/29/2013    Procedure: OPTICAL TRACKING SYSTEM FUSION SPINE POSTERIOR LUMBAR ONE LEVEL;  Lumbar 4-5 Laminectomy, Decompression; Lumbar 4-5 Transforaminal Lumbar Interbody Fusion ;  Surgeon: Saurav Valdez MD;  Location:  OR       Social History   Substance Use Topics     Smoking status: Former Smoker     Packs/day: 0.50     Years: 10.00     Types: Cigars     Quit date: 1/20/1985     Smokeless tobacco: Never Used     Alcohol use 3.5 oz/week      Comment: 2 per month     Family History   Problem Relation Age of Onset     HEART DISEASE Father      CANCER Paternal Grandfather      leukemia      HEART DISEASE Brother      DIABETES Brother          Labs reviewed in EPIC    Reviewed and updated as needed this visit by clinical staff  Tobacco  Allergies  Meds  Med Hx  Surg Hx  Fam Hx  Soc Hx      Reviewed and updated as needed this visit by Provider       ROS:  C: NEGATIVE for fever, chills, change in weight  R: NEGATIVE for significant cough or SOB  CV: NEGATIVE for chest pain, palpitations or peripheral edema  M: NEGATIVE for significant arthralgias or myalgia. POSITIVE for pain over right eye and in chest.   N: NEGATIVE for weakness, dizziness or paresthesias  All other systems reviewed and were negative.      This document serves as a record of the services and decisions personally performed and made by Gaby Baker MD. It was created on his/her behalf by Hermila Ontiveros, a trained medical scribe. The creation of this document is based the provider's statements to the medical scribe.    Fern Ontiveros 3:14 PM, May 15, 2017    OBJECTIVE:                                                    /66  Pulse 97  Temp 98.5  F (36.9  C) (Oral)  Wt 102.1 kg (225 lb)  SpO2 100%  BMI 34.98 kg/m2  Body mass index is 34.98 kg/(m^2).  GENERAL: healthy, alert  and no distress  EYES: PERRL and conjunctivae and sclerae normal  MS:  Ecchymosis and swelling around right orbit with a large hematoma and associated tenderness at the upper part of the orbital skull.     PSYCH: mentation appears normal, affect normal/bright     Diagnostic Test Results:  No results found for this or any previous visit (from the past 24 hour(s)).  Results for orders placed or performed in visit on 03/21/17   HEMOGLOBIN A1C   Result Value Ref Range    Hemoglobin A1C 6.4 (H) 4.3 - 6.0 %   BASIC METABOLIC PANEL   Result Value Ref Range    Sodium 136 133 - 144 mmol/L    Potassium 3.8 3.4 - 5.3 mmol/L    Chloride 98 94 - 109 mmol/L    Carbon Dioxide 32 20 - 32 mmol/L    Anion Gap 6 3 - 14 mmol/L    Glucose 134 (H) 70 - 99 mg/dL    Urea Nitrogen 17 7 - 30 mg/dL    Creatinine 1.04 0.66 - 1.25 mg/dL    GFR Estimate 69 >60 mL/min/1.7m2    GFR Estimate If Black 84 >60 mL/min/1.7m2    Calcium 10.4 (H) 8.5 - 10.1 mg/dL   Lipid panel reflex to direct LDL   Result Value Ref Range    Cholesterol 158 <200 mg/dL    Triglycerides 136 <150 mg/dL    HDL Cholesterol 57 >39 mg/dL    LDL Cholesterol Calculated 74 <100 mg/dL    Non HDL Cholesterol 101 <130 mg/dL   Albumin Random Urine Quantitative   Result Value Ref Range    Creatinine Urine 228 mg/dL    Albumin Urine mg/L 613 mg/L    Albumin Urine mg/g Cr 268.86 (H) 0 - 17 mg/g Cr   AST   Result Value Ref Range    AST 17 0 - 45 U/L   ALT   Result Value Ref Range    ALT 23 0 - 70 U/L   CK total   Result Value Ref Range    CK Total 94 30 - 300 U/L   Uric acid   Result Value Ref Range    Uric Acid 4.4 3.5 - 7.2 mg/dL        ASSESSMENT/PLAN:                                                    I spent 14 minutes of time with the patient and >50% of it was in education and counseling regarding preventive healthcare.     1. Traumatic hematoma of face, initial encounter  I independently visualized the xray:  Normal    No fracture observed via xray per radiology either.   .  Instructed patient to use ice intermittently for pain relief.   Let me know if it doesn't improve or worsens.       Patient Instructions   - You can use ice on the area to control pain and swelling.  - The blood will travel down your face. This is normal.   - If the swelling does not go away, let us know.       The information in this document, created by the medical scribe for me, accurately reflects the services I personally performed and the decisions made by me. I have reviewed and approved this document for accuracy prior to leaving the patient care area.  Gaby Baker MD  3:14 PM, 05/15/17    Gaby Baker MD  [facility]     Start: 3:11 PM   Out: 3:21 PM   In: 4:15 PM   End: 4:19 PM

## 2017-05-15 NOTE — TELEPHONE ENCOUNTER
Reason for Call:  Other appointment    Reason for call:  Patient reporting a symptom    Symptom or request: fall and head injury    Duration (how long have symptoms been present): yesterday 11:30-12p    Have you been treated for this before? No    Additional comments: Wife states patient fell yesterday on a rock impacting his head and his eye. She states there is swelling occurring around the impact site. She denies any vision changes, dizziness, vomiting, chest pain, shortness of breath, bleeding, or chest pain. Would like to see Dr. Baker today or tomorrow. Please call to advise.     Phone Number patient can be reached at:  Home number on file [phone] (home)    Best Time:  any    Can we leave a detailed message on this number:  YES    Call taken on 5/15/2017 at 1:44 PM by Drew Sneed

## 2017-05-15 NOTE — MR AVS SNAPSHOT
After Visit Summary   5/15/2017    Zoey Romo    MRN: [id number]           Patient Information     Date Of Birth          1940        Visit Information        Provider Department      5/15/2017 2:45 PM Gaby Baker MD [facility]        Today's Diagnoses     Traumatic hematoma of face, initial encounter    -  1      Care Instructions    - You can use ice on the area to control pain and swelling.  - The blood will travel down your face. This is normal.   - If the swelling does not go away, let us know.     [facility]    If you have any questions regarding to your visit please contact your care team:     Team Pink:   Clinic Hours Telephone Number   Internal Medicine:  Dr. Gaby Denise NP       7am-7pm  Monday - Thursday   7am-5pm  Fridays  [phone]  (Appointment scheduling available 24/7)    Questions about your visit?  Team Line  [phone]   Urgent Care - [place] - 11am-9pm Monday-Friday Saturday-Sunday- 9am-5pm   [place] - 5pm-9pm Monday-Friday Saturday-Sunday- 9am-5pm  [phone] - [place]   [phone] - [place]       What options do I have for visits at the clinic other than the traditional office visit?  To expand how we care for you, many of our providers are utilizing electronic visits (e-visits) and telephone visits, when medically appropriate, for interactions with their patients rather than a visit in the clinic.   We also offer nurse visits for many medical concerns. Just like any other service, we will bill your insurance company for this type of visit based on time spent on the phone with your provider. Not all insurance companies cover these visits. Please check with your medical insurance if this type of visit is covered. You will be responsible for any charges that are not paid by your insurance.      E-visits via [employer]:  generally incur a $35.00  fee.  Telephone visits:  Time spent on the phone: *charged based on time that is spent on the phone in increments of 10 minutes. Estimated cost:   5-10 mins $30.00   11-20 mins. $59.00   21-30 mins. $85.00   Use [employer] (secure email communication and access to your chart) to send your primary care provider a message or make an appointment. Ask someone on your Team how to sign up for [employer].    For a Price Quote for your services, please call our [employer] Line at [phone].    As always, Thank you for trusting us with your health care needs!    Discharged By:  JAYDEN JENNINGS          Follow-ups after your visit        Who to contact     If you have questions or need follow up information about today's clinic visit or your schedule please contact [facility] directly at [phone].  Normal or non-critical lab and imaging results will be communicated to you by MyChart, letter or phone within 4 business days after the clinic has received the results. If you do not hear from us within 7 days, please contact the clinic through [employer] or phone. If you have a critical or abnormal lab result, we will notify you by phone as soon as possible.  Submit refill requests through [employer] or call your pharmacy and they will forward the refill request to us. Please allow 3 business days for your refill to be completed.          Additional Information About Your Visit        [employer] Information     [employer] gives you secure access to your electronic health record. If you see a primary care provider, you can also send messages to your care team and make appointments. If you have questions, please call your primary care clinic.  If you do not have a primary care provider, please call [phone] and they will assist you.        Care EveryWhere ID     This is your Care EveryWhere ID. This could be used by other organizations to access your [facility] medical records  [id number]        Your Vitals Were     Pulse  Temperature Pulse Oximetry BMI (Body Mass Index)          97 98.5  F (36.9  C) (Oral) 100% 34.98 kg/m2         Blood Pressure from Last 3 Encounters:   05/15/17 136/66   03/21/17 130/80   12/05/16 157/85    Weight from Last 3 Encounters:   05/15/17 225 lb (102.1 kg)   03/21/17 218 lb (98.9 kg)   12/05/16 223 lb (101.2 kg)              We Performed the Following     XR Facial Bone G/E 3 Views        Primary Care Provider Office Phone # Fax #    Gaby Baker [phone]       [address] [address]        Thank you!     Thank you for choosing [facility]  for your care. Our goal is always to provide you with excellent care. Hearing back from our patients is one way we can continue to improve our services. Please take a few minutes to complete the written survey that you may receive in the mail after your visit with us. Thank you!             Your Updated Medication List - Protect others around you: Learn how to safely use, store and throw away your medicines at www.disposemymeds.org.          This list is accurate as of: 5/15/17  4:20 PM.  Always use your most recent med list.                   Brand Name Dispense Instructions for use    allopurinol 100 MG tablet    ZYLOPRIM    180 tablet    TAKE 1 TABLET (100 MG) BY MOUTH 2 TIMES DAILY       ammonium lactate 12 % cream    LAC-HYDRIN    385 g    Apply topically 2 times daily as needed for dry skin       aspirin 81 MG tablet      Take 1 tablet by mouth daily.       blood glucose monitoring test strip    GOOD CONTOUR    1 Box    1 strip by In Vitro route daily Use to test blood sugars  daily or as directed.       CALTRATE 600 + D 600-200 MG-IU Tabs      1 TABLET DAILY       chlorthalidone 25 MG tablet    HYGROTON    90 tablet    Take 1 tablet (25 mg) by mouth daily       diltiazem HCl COATED BEADS 240 MG Tb24    CARDIZEM LA    90 tablet    Take 240 mg by mouth daily       GLUCOSAMINE  CHONDROITIN COMPLX PO          lisinopril 40 MG tablet    PRINIVIL/ZESTRIL    90 tablet    Take 1 tablet (40 mg) by mouth daily       metFORMIN 1000 MG tablet    GLUCOPHAGE    180 tablet    Take 1 tablet (1,000 mg) by mouth 2 times daily (with meals)       mometasone 0.1 % cream    ELOCON    45 g    Apply sparingly to affected area twice daily as needed.  Do not apply to face.       pravastatin 80 MG tablet    PRAVACHOL    90 tablet    Take 1 tablet (80 mg) by mouth daily

## 2017-05-31 ENCOUNTER — OFFICE VISIT (OUTPATIENT)
Dept: OTOLARYNGOLOGY | Facility: CLINIC | Age: 77
End: 2017-05-31
Payer: COMMERCIAL

## 2017-05-31 VITALS — HEIGHT: 67 IN | BODY MASS INDEX: 34.97 KG/M2 | RESPIRATION RATE: 16 BRPM | WEIGHT: 222.8 LBS

## 2017-05-31 DIAGNOSIS — S00.83XA FACIAL HEMATOMA, INITIAL ENCOUNTER: Primary | ICD-10-CM

## 2017-05-31 PROCEDURE — 10160 PNXR ASPIR ABSC HMTMA BULLA: CPT | Performed by: OTOLARYNGOLOGY

## 2017-05-31 PROCEDURE — 99214 OFFICE O/P EST MOD 30 MIN: CPT | Mod: 25 | Performed by: OTOLARYNGOLOGY

## 2017-05-31 RX ORDER — CEPHALEXIN 500 MG/1
500 CAPSULE ORAL 4 TIMES DAILY
Qty: 28 CAPSULE | Refills: 0 | Status: SHIPPED | OUTPATIENT
Start: 2017-05-31 | End: 2017-06-07

## 2017-05-31 ASSESSMENT — PAIN SCALES - GENERAL: PAINLEVEL: NO PAIN (0)

## 2017-05-31 NOTE — PROGRESS NOTES
Chief Complaint - right face hematoma    History of Present Illness - Zoey Romo is a 77 year old male fell on a rock two weeks ago. He has had swelling above his right eye since. Also bruising. X-rays show no fracture. Vision is okay. No LOC. No other injuries. I cauterized his nose a year ago. No bleeding since. He was happy about this.     Past Medical History -   Patient Active Problem List   Diagnosis     GERD (gastroesophageal reflux disease)     HYPERLIPIDEMIA LDL GOAL <100     AAA (abdominal aortic aneurysm) (H)     Hypertension goal BP (blood pressure) < 130/80     Dermatochalasis OU     NORMA (obstructive sleep apnea)     Advanced directives, counseling/discussion     Gout     Cataract OU     Lumbar stenosis     Spondylolisthesis of lumbar region     Fusion of spine     Eczema     CKD (chronic kidney disease) stage 2, GFR 60-89 ml/min     Hypercalcemia     Type 2 diabetes mellitus with diabetic nephropathy (H)     Gastroesophageal reflux disease without esophagitis     Chronic gout without tophus, unspecified cause, unspecified site     Abdominal aortic aneurysm without rupture (H)     HTN, goal below 130/80     Epistaxis     Type 2 diabetes mellitus without complication, without long-term current use of insulin (H)       Current Medications -   Current Outpatient Prescriptions:      allopurinol (ZYLOPRIM) 100 MG tablet, TAKE 1 TABLET (100 MG) BY MOUTH 2 TIMES DAILY, Disp: 180 tablet, Rfl: 3     metFORMIN (GLUCOPHAGE) 1000 MG tablet, Take 1 tablet (1,000 mg) by mouth 2 times daily (with meals), Disp: 180 tablet, Rfl: 3     diltiazem HCl COATED BEADS (CARDIZEM LA) 240 MG TB24, Take 240 mg by mouth daily, Disp: 90 tablet, Rfl: 4     chlorthalidone (HYGROTON) 25 MG tablet, Take 1 tablet (25 mg) by mouth daily, Disp: 90 tablet, Rfl: 4     lisinopril (PRINIVIL/ZESTRIL) 40 MG tablet, Take 1 tablet (40 mg) by mouth daily, Disp: 90 tablet, Rfl: 3     pravastatin (PRAVACHOL) 80 MG tablet, Take 1 tablet (80 mg)  "by mouth daily, Disp: 90 tablet, Rfl: 3     blood glucose monitoring (GOOD CONTOUR) test strip, 1 strip by In Vitro route daily Use to test blood sugars  daily or as directed., Disp: 1 Box, Rfl: 11     mometasone (ELOCON) 0.1 % cream, Apply sparingly to affected area twice daily as needed.  Do not apply to face., Disp: 45 g, Rfl: 3     ammonium lactate (LAC-HYDRIN) 12 % cream, Apply topically 2 times daily as needed for dry skin, Disp: 385 g, Rfl: 3     GLUCOSAMINE CHONDROITIN COMPLX PO, , Disp: , Rfl:      CALTRATE 600 + D 600-200 MG-IU OR TABS, 1 TABLET DAILY, Disp: , Rfl:      aspirin 81 MG tablet, Take 1 tablet by mouth daily., Disp: , Rfl:     Allergies -   Allergies   Allergen Reactions     Pollen [Pollen Extract]        Social History -   Social History     Social History     Marital status:      Spouse name: N/A     Number of children: N/A     Years of education: N/A     Occupational History      Retired     Social History Main Topics     Smoking status: Former Smoker     Packs/day: 0.50     Years: 10.00     Types: Cigars     Quit date: 1/20/1985     Smokeless tobacco: Never Used     Alcohol use 3.5 oz/week      Comment: 2 per month     Drug use: No     Sexual activity: Yes     Partners: Female     Birth control/ protection: None     Other Topics Concern     Parent/Sibling W/ Cabg, Mi Or Angioplasty Before 65f 55m? No     Social History Narrative       Family History -   Family History   Problem Relation Age of Onset     HEART DISEASE Father      CANCER Paternal Grandfather      leukemia      HEART DISEASE Brother      DIABETES Brother        Review of Systems - As per HPI and PMHx, otherwise 7 review of the head and neck is negative.    Physical Exam  Resp 16  Ht 1.708 m (5' 7.25\")  Wt 101.1 kg (222 lb 12.8 oz)  BMI 34.64 kg/m2  General - The patient is in no distress.  Alert and oriented x3, answers questions and cooperates with examination appropriately.   Voice and Breathing - The patient was " breathing comfortably without the use of accessory muscles. There was no wheezing, stridor, or stertor.  The patients voice was clear and strong, with no dysphonia.  Head and Face - 2.5 cm right eyelid/brow hematoma with ecchymosis. Some periorbital ecchymosis.     Eyes - Pupils are reactive to light. Extraocular movements intact. Sclera were not icteric or injected, conjunctiva were pink and moist.  Neurologic - Cranial nerves II-XII are grossly intact. Specifically, the facial nerve is intact, House-Brackmann grade 1 of 6.   Nose - No significant external deformity. No polyps, masses, or purulence.    Procedure - Incision and drainage right brow hematoma. I injected 2% Lidocaine with 1:100,000 epinephrine (1 ml) into the right upper eyelid/brow hematoma after cleansing the skin with alcohol. i then cleansed the skin with betadine. i aspirated about 3-4 cc of dark blood with a 16 gauge needle. I then made a 1 cm incision in the skin and dissected spreading with an iris. I was able to express dark clot until the hematoma was evacuated and gone. i closed this with a running 5-0 fast suture.        A/P - Zoey Romo is a 77 year old male with a right lateral brow hematoma for 2 weeks. i evacuated this today. i gave him keflex for 7 days and asked he apply bacitracin on the incision site for 7 days. Call or return if hematoma returns or it gets infected.          Garland Mackenzie MD  Otolaryngology  [facility]

## 2017-05-31 NOTE — PATIENT INSTRUCTIONS
General Scheduling Information  To schedule your CT/MRI scan, please contact [place] [facility] at [phone]   [address] [place] [address]    To schedule your Surgery, please contact our Specialty Schedulers at [phone]    ENT Clinic Locations Clinic Hours Telephone Number     [facility]  [address] [place] [address]   Tuesday:       8:00am -- 4:00pm    Wednesday:  8:00am - 4:00pm   To schedule an appointment with   Dr. Mackenzie,   please contact our   Specialty Scheduling Department at:     [phone]       [facility]  [address] [place] [address]   Friday:          8:00am - 4:00pm         Urgent Care Locations Clinic Hours Telephone Numbers     [facility]  [address] [place] [address]     Monday-Friday:     11:00pm - 9:00pm    Saturday-Sunday:  9:00am - 5:00pm   [phone]     [facility] Dw[facility]  [address] [place] [address]     Monday-Friday:      5:00pm - 9:00pm     Saturday-Sunday:  9:00am - 5:00pm   [phone]

## 2017-05-31 NOTE — MR AVS SNAPSHOT
After Visit Summary   5/31/2017    Zoey Romo    MRN: [id number]           Patient Information     Date Of Birth          1940        Visit Information        Provider Department      5/31/2017 1:00 PM Garland Mackenzie MD [facility]        Today's Diagnoses     Facial hematoma, initial encounter    -  1      Care Instructions    General Scheduling Information  To schedule your CT/MRI scan, please contact [place] [facility] at [phone]   [address] [place] [address]    To schedule your Surgery, please contact our Specialty Schedulers at [phone]    ENT Clinic Locations Clinic Hours Telephone Number     [facility] [place]  [address]  [place] [address]   Tuesday:       8:00am -- 4:00pm    Wednesday:  8:00am - 4:00pm   To schedule an appointment with   Dr. Mackenzie,   please contact our   Specialty Scheduling Department at:     [phone]       [facility]  [address] [address]   Friday:          8:00am - 4:00pm         Urgent Care Locations Clinic Hours Telephone Numbers     [facility] [place]  [address] [place] [address]     Monday-Friday:     11:00pm - 9:00pm    Saturday-Sunday:  9:00am - 5:00pm   [phone]     [facility]  [address]   [place] [address]     Monday-Friday:      5:00pm - 9:00pm     Saturday-Sunday:  9:00am - 5:00pm   [phone]               Follow-ups after your visit        Who to contact     If you have questions or need follow up information about today's clinic visit or your schedule please contact [facility] directly at [phone].  Normal or non-critical lab and imaging results will be communicated to you by MyChart, letter or phone within 4 business days after the clinic has received the results. If you do not hear from us within 7 days, please contact the clinic through MyChart or phone. If you have a critical or abnormal lab result, we will  "notify you by phone as soon as possible.  Submit refill requests through tydy or call your pharmacy and they will forward the refill request to us. Please allow 3 business days for your refill to be completed.          Additional Information About Your Visit        Process [employer] Information     tydy gives you secure access to your electronic health record. If you see a primary care provider, you can also send messages to your care team and make appointments. If you have questions, please call your primary care clinic.  If you do not have a primary care provider, please call [phone] and they will assist you.        Care EveryWhere ID     This is your Care EveryWhere ID. This could be used by other organizations to access your [facility] medical records  [id number]        Your Vitals Were     Respirations Height BMI (Body Mass Index)             16 1.708 m (5' 7.25\") 34.64 kg/m2          Blood Pressure from Last 3 Encounters:   05/15/17 136/66   03/21/17 130/80   12/05/16 157/85    Weight from Last 3 Encounters:   05/31/17 101.1 kg (222 lb 12.8 oz)   05/15/17 102.1 kg (225 lb)   03/21/17 98.9 kg (218 lb)              We Performed the Following     Aspirate Abscess/Cyst          Today's Medication Changes          These changes are accurate as of: 5/31/17  4:48 PM.  If you have any questions, ask your nurse or doctor.               Start taking these medicines.        Dose/Directions    cephALEXin 500 MG capsule   Commonly known as:  KEFLEX   Used for:  Facial hematoma, initial encounter   Started by:  Garland Mackenzie MD        Dose:  500 mg   Take 1 capsule (500 mg) by mouth 4 times daily for 7 days   Quantity:  28 capsule   Refills:  0            Where to get your medicines      These medications were sent to [facility]/pharmacy #[id number] - [place], [place] - [address] OF [facility]  [address] [place], [place] [address], [facility] [address]     Phone:  [phone]     cephALEXin 500 MG capsule                Primary Care " Provider Office Phone # Fax #    Gaby Baker [phone]       [address]  [place] [address]        Thank you!     Thank you for choosing [facility]  for your care. Our goal is always to provide you with excellent care. Hearing back from our patients is one way we can continue to improve our services. Please take a few minutes to complete the written survey that you may receive in the mail after your visit with us. Thank you!             Your Updated Medication List - Protect others around you: Learn how to safely use, store and throw away your medicines at www.disposemymeds.org.          This list is accurate as of: 5/31/17  4:48 PM.  Always use your most recent med list.                   Brand Name Dispense Instructions for use    allopurinol 100 MG tablet    ZYLOPRIM    180 tablet    TAKE 1 TABLET (100 MG) BY MOUTH 2 TIMES DAILY       ammonium lactate 12 % cream    LAC-HYDRIN    385 g    Apply topically 2 times daily as needed for dry skin       aspirin 81 MG tablet      Take 1 tablet by mouth daily.       blood glucose monitoring test strip    GOOD CONTOUR    1 Box    1 strip by In Vitro route daily Use to test blood sugars  daily or as directed.       CALTRATE 600 + D 600-200 MG-IU Tabs      1 TABLET DAILY       cephALEXin 500 MG capsule    KEFLEX    28 capsule    Take 1 capsule (500 mg) by mouth 4 times daily for 7 days       chlorthalidone 25 MG tablet    HYGROTON    90 tablet    Take 1 tablet (25 mg) by mouth daily       diltiazem HCl COATED BEADS 240 MG Tb24    CARDIZEM LA    90 tablet    Take 240 mg by mouth daily       GLUCOSAMINE CHONDROITIN COMPLX PO          lisinopril 40 MG tablet    PRINIVIL/ZESTRIL    90 tablet    Take 1 tablet (40 mg) by mouth daily       metFORMIN 1000 MG tablet    GLUCOPHAGE    180 tablet    Take 1 tablet (1,000 mg) by mouth 2 times daily (with meals)       mometasone 0.1 % cream    ELOCON    45 g    Apply  sparingly to affected area twice daily as needed.  Do not apply to face.       pravastatin 80 MG tablet    PRAVACHOL    90 tablet    Take 1 tablet (80 mg) by mouth daily

## 2017-05-31 NOTE — NURSING NOTE
"Chief Complaint   Patient presents with     Facial Injury     Hematoma       Initial Resp 16  Ht 1.708 m (5' 7.25\")  Wt 101.1 kg (222 lb 12.8 oz)  BMI 34.64 kg/m2 Estimated body mass index is 34.64 kg/(m^2) as calculated from the following:    Height as of this encounter: 1.708 m (5' 7.25\").    Weight as of this encounter: 101.1 kg (222 lb 12.8 oz).  Medication Reconciliation: complete     Whitney Lozano MA    "

## 2017-06-08 ENCOUNTER — MYC MEDICAL ADVICE (OUTPATIENT)
Dept: FAMILY MEDICINE | Facility: CLINIC | Age: 77
End: 2017-06-08

## 2017-06-08 DIAGNOSIS — E11.9 TYPE 2 DIABETES MELLITUS WITHOUT COMPLICATION, WITHOUT LONG-TERM CURRENT USE OF INSULIN (H): ICD-10-CM

## 2017-06-08 NOTE — TELEPHONE ENCOUNTER
metFORMIN (GLUCOPHAGE) 1000 MG tablet        Last Written Prescription Date: 3-21-17  Last Fill Quantity: 180, # refills: 3  Last Office Visit with [facility], [facility] or [facility] prescribing provider:  5-15-17     BP Readings from Last 3 Encounters:   05/15/17 136/66   03/21/17 130/80   12/05/16 157/85     Lab Results   Component Value Date    MICROL 613 03/21/2017     Lab Results   Component Value Date    UMALCR 268.86 03/21/2017     Creatinine   Date Value Ref Range Status   03/21/2017 1.04 0.66 - 1.25 mg/dL Final   ]  GFR Estimate   Date Value Ref Range Status   03/21/2017 69 >60 mL/min/1.7m2 Final     Comment:     Non  GFR Calc   01/15/2016 67 >60 mL/min/1.7m2 Final     Comment:     Non  GFR Calc   07/28/2015 68 >60 mL/min/1.7m2 Final     Comment:     Non  GFR Calc     GFR Estimate If Black   Date Value Ref Range Status   03/21/2017 84 >60 mL/min/1.7m2 Final     Comment:      GFR Calc   01/15/2016 81 >60 mL/min/1.7m2 Final     Comment:      GFR Calc   07/28/2015 82 >60 mL/min/1.7m2 Final     Comment:      GFR Calc     Lab Results   Component Value Date    CHOL 158 03/21/2017     Lab Results   Component Value Date    HDL 57 03/21/2017     Lab Results   Component Value Date    LDL 74 03/21/2017     Lab Results   Component Value Date    TRIG 136 03/21/2017     Lab Results   Component Value Date    CHOLHDLRATIO 3.6 03/10/2015     Lab Results   Component Value Date    AST 17 03/21/2017     Lab Results   Component Value Date    ALT 23 03/21/2017     Lab Results   Component Value Date    A1C 6.4 03/21/2017    A1C 6.7 05/10/2016    A1C 7.5 01/15/2016    A1C 6.0 07/28/2015    A1C 7.3 03/10/2015     Potassium   Date Value Ref Range Status   03/21/2017 3.8 3.4 - 5.3 mmol/L Final

## 2017-06-08 NOTE — TELEPHONE ENCOUNTER
Prescription approved per [facility] Refill Protocol.  Tere St, RN - BC

## 2017-09-11 ENCOUNTER — DOCUMENTATION ONLY (OUTPATIENT)
Dept: LAB | Facility: CLINIC | Age: 77
End: 2017-09-11

## 2017-09-11 DIAGNOSIS — K21.9 GASTROESOPHAGEAL REFLUX DISEASE WITHOUT ESOPHAGITIS: ICD-10-CM

## 2017-09-11 DIAGNOSIS — E11.21 TYPE 2 DIABETES MELLITUS WITH DIABETIC NEPHROPATHY, WITHOUT LONG-TERM CURRENT USE OF INSULIN (H): ICD-10-CM

## 2017-09-11 DIAGNOSIS — N18.2 CKD (CHRONIC KIDNEY DISEASE) STAGE 2, GFR 60-89 ML/MIN: Primary | ICD-10-CM

## 2017-09-27 DIAGNOSIS — N18.2 CKD (CHRONIC KIDNEY DISEASE) STAGE 2, GFR 60-89 ML/MIN: ICD-10-CM

## 2017-09-27 DIAGNOSIS — K21.9 GASTROESOPHAGEAL REFLUX DISEASE WITHOUT ESOPHAGITIS: ICD-10-CM

## 2017-09-27 DIAGNOSIS — E11.21 TYPE 2 DIABETES MELLITUS WITH DIABETIC NEPHROPATHY, WITHOUT LONG-TERM CURRENT USE OF INSULIN (H): ICD-10-CM

## 2017-09-27 LAB
ALT SERPL W P-5'-P-CCNC: 23 U/L (ref 0–70)
ANION GAP SERPL CALCULATED.3IONS-SCNC: 9 MMOL/L (ref 3–14)
BUN SERPL-MCNC: 19 MG/DL (ref 7–30)
CALCIUM SERPL-MCNC: 9.8 MG/DL (ref 8.5–10.1)
CHLORIDE SERPL-SCNC: 99 MMOL/L (ref 94–109)
CO2 SERPL-SCNC: 28 MMOL/L (ref 20–32)
CREAT SERPL-MCNC: 0.95 MG/DL (ref 0.66–1.25)
CREAT UR-MCNC: 91 MG/DL
GFR SERPL CREATININE-BSD FRML MDRD: 76 ML/MIN/1.7M2
GLUCOSE SERPL-MCNC: 123 MG/DL (ref 70–99)
HBA1C MFR BLD: 6.2 % (ref 4.3–6)
HGB BLD-MCNC: 14.2 G/DL (ref 13.3–17.7)
POTASSIUM SERPL-SCNC: 3.6 MMOL/L (ref 3.4–5.3)
PROT UR-MCNC: 0.3 G/L
PROT/CREAT 24H UR: 0.32 G/G CR (ref 0–0.2)
SODIUM SERPL-SCNC: 136 MMOL/L (ref 133–144)

## 2017-09-27 PROCEDURE — 85018 HEMOGLOBIN: CPT | Performed by: INTERNAL MEDICINE

## 2017-09-27 PROCEDURE — 84156 ASSAY OF PROTEIN URINE: CPT | Performed by: INTERNAL MEDICINE

## 2017-09-27 PROCEDURE — 80048 BASIC METABOLIC PNL TOTAL CA: CPT | Performed by: INTERNAL MEDICINE

## 2017-09-27 PROCEDURE — 84460 ALANINE AMINO (ALT) (SGPT): CPT | Performed by: INTERNAL MEDICINE

## 2017-09-27 PROCEDURE — 36415 COLL VENOUS BLD VENIPUNCTURE: CPT | Performed by: INTERNAL MEDICINE

## 2017-09-27 PROCEDURE — 83036 HEMOGLOBIN GLYCOSYLATED A1C: CPT | Performed by: INTERNAL MEDICINE

## 2017-09-28 ENCOUNTER — MYC MEDICAL ADVICE (OUTPATIENT)
Dept: INTERNAL MEDICINE | Facility: CLINIC | Age: 77
End: 2017-09-28

## 2017-09-28 DIAGNOSIS — E11.9 TYPE 2 DIABETES MELLITUS WITHOUT COMPLICATION, WITHOUT LONG-TERM CURRENT USE OF INSULIN (H): ICD-10-CM

## 2017-09-28 NOTE — TELEPHONE ENCOUNTER
metFORMIN (GLUCOPHAGE) 1000 MG tablet      Last Written Prescription Date: 6-8-17  Last Fill Quantity: 180, # refills: 0  Last Office Visit with [facility] or [facility] prescribing provider:  5-15-17   Next 5 appointments (look out 90 days)     Oct 04, 2017 12:30 PM CDT   [facility] Visit with Gaby Baker MD   [facility]    [address] [facility] [id number] [phone]                   BP Readings from Last 3 Encounters:   05/15/17 136/66   03/21/17 130/80   12/05/16 157/85     Lab Results   Component Value Date    MICROL 613 03/21/2017     Lab Results   Component Value Date    UMALCR 268.86 03/21/2017     Creatinine   Date Value Ref Range Status   09/27/2017 0.95 0.66 - 1.25 mg/dL Final   ]  GFR Estimate   Date Value Ref Range Status   09/27/2017 76 >60 mL/min/1.7m2 Final     Comment:     Non  GFR Calc   03/21/2017 69 >60 mL/min/1.7m2 Final     Comment:     Non  GFR Calc   01/15/2016 67 >60 mL/min/1.7m2 Final     Comment:     Non  GFR Calc     GFR Estimate If Black   Date Value Ref Range Status   09/27/2017 >90 >60 mL/min/1.7m2 Final     Comment:      GFR Calc   03/21/2017 84 >60 mL/min/1.7m2 Final     Comment:      GFR Calc   01/15/2016 81 >60 mL/min/1.7m2 Final     Comment:      GFR Calc     Lab Results   Component Value Date    CHOL 158 03/21/2017     Lab Results   Component Value Date    HDL 57 03/21/2017     Lab Results   Component Value Date    LDL 74 03/21/2017     Lab Results   Component Value Date    TRIG 136 03/21/2017     Lab Results   Component Value Date    CHOLHDLRATIO 3.6 03/10/2015     Lab Results   Component Value Date    AST 17 03/21/2017     Lab Results   Component Value Date    ALT 23 09/27/2017     Lab Results   Component Value Date    A1C 6.2 09/27/2017    A1C 6.4 03/21/2017    A1C 6.7 05/10/2016    A1C 7.5 01/15/2016    A1C 6.0 07/28/2015      Potassium   Date Value Ref Range Status   09/27/2017 3.6 3.4 - 5.3 mmol/L Final

## 2017-09-29 NOTE — TELEPHONE ENCOUNTER
Prescription approved per [facility] Refill Protocol.    Signed Prescriptions:                        Disp   Refills    metFORMIN (GLUCOPHAGE) 1000 MG tablet      180 ta*1        Sig: Take 1 tablet (1,000 mg) by mouth 2 times daily (with           meals)  Authorizing Provider: LISSETTE WESTBROOK  Ordering User: LYNN PARRA RN

## 2017-10-02 NOTE — PROGRESS NOTES
INTERNAL MEDICINE  SUBJECTIVE:   Zoey Romo is a 77 year old male who presents to clinic today for the following health issues:    Patient presents to clinic today for diabetes check.     Lab Results   Component Value Date    A1C 6.2 09/27/2017    A1C 6.4 03/21/2017    A1C 6.7 05/10/2016    A1C 7.5 01/15/2016    A1C 6.0 07/28/2015       He has had an uncomfortable feeling in his right groin that started 4-6 weeks ago. His riding  got stuck in gear and he had to lift it up. He has had the feeling since. Not sure what else would cause the feeling. Denies feeling a bulge. He has applied pressure to the area, coughed, and does not notice anything. He does not have pain, more of an annoying ache.    His bowel movements are normal. Denies fever.     He is no longer jogging. He is not actively trying to lose weight, but knows he should.      Wt Readings from Last 5 Encounters:   10/04/17 100.2 kg (221 lb)   05/31/17 101.1 kg (222 lb 12.8 oz)   05/15/17 102.1 kg (225 lb)   03/21/17 98.9 kg (218 lb)   12/05/16 101.2 kg (223 lb)     Problem list and histories reviewed & adjusted, as indicated.  Additional history: as documented    Labs reviewed in EPIC  Reviewed and updated as needed this visit by clinical staff  Tobacco  Allergies  Meds  Med Hx  Surg Hx  Fam Hx  Soc Hx      Reviewed and updated as needed this visit by Provider         ROS:  C: NEGATIVE for fever, chills, change in weight  R: NEGATIVE for significant cough or SOB  CV: NEGATIVE for chest pain, palpitations or peripheral edema  GI: NEGATIVE for nausea, abdominal pain, heartburn, or change in bowel habits  M: NEGATIVE for significant arthralgias or myalgia  N: NEGATIVE for weakness, dizziness or paresthesias  P: NEGATIVE for changes in mood or affect    This document serves as a record of the services and decisions personally performed and made by Gaby Baker MD. It was created on his/her behalf by Hayde Kelley, trained medical scribe.  The creation of this document is based the provider's statements to the medical scribes.    Fern Kelley 12:48 PM, October 4, 2017  OBJECTIVE:     /78  Pulse 91  Temp 97.6  F (36.4  C) (Oral)  Wt 100.2 kg (221 lb)  SpO2 97%  BMI 34.36 kg/m2  Body mass index is 34.36 kg/(m^2).  GENERAL: healthy, alert and no distress  NECK: no adenopathy, no asymmetry, masses, or scars and thyroid normal to palpation  RESP: lungs clear to auscultation - no rales, rhonchi or wheezes  CV: regular rate and rhythm, normal S1 S2, no S3 or S4, no murmur, click or rub, no peripheral edema and peripheral pulses strong  ABDOMEN: soft, nontender, no hepatosplenomegaly, no masses and bowel sounds normal  NEURO: Normal strength and tone, mentation intact and speech normal  PSYCH: mentation appears normal, affect normal/bright  Diabetic foot exam: normal DP and PT pulses, no trophic changes or ulcerative lesions, normal sensory exam and normal monofilament exam    Diagnostic Test Results:  Results for orders placed or performed in visit on 09/27/17   Hemoglobin A1c   Result Value Ref Range    Hemoglobin A1C 6.2 (H) 4.3 - 6.0 %   **Basic metabolic panel FUTURE anytime   Result Value Ref Range    Sodium 136 133 - 144 mmol/L    Potassium 3.6 3.4 - 5.3 mmol/L    Chloride 99 94 - 109 mmol/L    Carbon Dioxide 28 20 - 32 mmol/L    Anion Gap 9 3 - 14 mmol/L    Glucose 123 (H) 70 - 99 mg/dL    Urea Nitrogen 19 7 - 30 mg/dL    Creatinine 0.95 0.66 - 1.25 mg/dL    GFR Estimate 76 >60 mL/min/1.7m2    GFR Estimate If Black >90 >60 mL/min/1.7m2    Calcium 9.8 8.5 - 10.1 mg/dL   **ALT FUTURE anytime   Result Value Ref Range    ALT 23 0 - 70 U/L   Protein  random urine with Creat Ratio   Result Value Ref Range    Protein Random Urine 0.30 g/L    Protein Total Urine g/gr Creatinine 0.32 (H) 0 - 0.2 g/g Cr   Creatinine random urine   Result Value Ref Range    Creatinine Urine Random 91 mg/dL   **Hemoglobin FUTURE anytime   Result Value Ref  Range    Hemoglobin 14.2 13.3 - 17.7 g/dL     ASSESSMENT/PLAN:   1. Type 2 diabetes mellitus with diabetic nephropathy, without long-term current use of insulin (H)  Controlled. Continues on Metformin 1000 mg bid. Follow up in 6 months.    2. Gastroesophageal reflux disease without esophagitis  Controlled.    3. Hypertension goal BP (blood pressure) < 130/80  Controlled. Continues on diltiazem 240 mg, chlorthalidone 25 mg, and lisinopril 40 mg daily.  BP Readings from Last 3 Encounters:   10/04/17 128/78   05/15/17 136/66   03/21/17 130/80     4. CKD (chronic kidney disease) stage 2, GFR 60-89 ml/min  Stable.    5. BMI 34.0-34.9,adult  Discussed diet and exercise. He is no longer jogging.    6. Abdominal pain, right lower quadrant  Unclear etiology. x4-6 weeks. He will continue to monitor the spot for a bulge or worsening pain.  CT will be needed if pain continues.      Patient Instructions   Keep an eye out for a bulge or worsening pain in your right groin.    Follow up with me in 6 months.    At your visit today, we discussed your risk for falls and preventive options.    Fall Prevention  Falls often occur due to slipping, tripping or losing your balance. Millions of people fall every year and injure themselves. Here are ways to reduce your risk of falling again.    Think about your fall, was there anything that caused your fall that can be fixed, removed, or replaced?    Make your home safe by keeping walkways clear of objects you may trip over.    Use non-slip pads under rugs. Do not use area rugs or small throw rugs.    Use non-slip mats in bathtubs and showers.    Install handrails and lights on staircases.    Do not walk in poorly lit areas.    Do not stand on chairs or wobbly ladders.    Use caution when reaching overhead or looking upward. This position can cause a loss of balance.    Be sure your shoes fit properly, have non-slip bottoms and are in good condition.     Wear shoes both inside and out. Avoid  going barefoot or wearing slippers.    Be cautious when going up and down stairs, curbs, and when walking on uneven sidewalks.    If your balance is poor, consider using a cane or walker.    If your fall was related to alcohol use, stop or limit alcohol intake.     If your fall was related to use of sleeping medicines, talk to your doctor about this. You may need to reduce your dosage at bedtime if you awaken during the night to go to the bathroom.      To reduce the need for nighttime bathroom trips:    Avoid drinking fluids for several hours before going to bed    Empty your bladder before going to bed    Men can keep a urinal at the bedside    Stay as active as you can. Balance, flexibility, strength, and endurance all come from exercise. They all play a role in preventing falls. Ask your healthcare provider which types of activity are right for you.    Get your vision checked on a regular basis.    If you have pets, know where they are before you stand up or walk so you don't trip over them.    Use night lights.  Date Last Reviewed: 11/5/2015 2000-2017 [employer]. [address] [place] [address]. All rights reserved. This information is not intended as a substitute for professional medical care. Always follow your healthcare professional's instructions.        I spent 12 minutes of time with the patient and >50% of it was in education and counseling regarding diabetes check.    The information in this document, created by the medical scribe for me, accurately reflects the services I personally performed and the decisions made by me. I have reviewed and approved this document for accuracy prior to leaving the patient care area.  Gaby Baker MD  12:48 PM, 10/04/17    Gaby Baker MD  [facility]    Start 12:48 PM  End 1:00 PM

## 2017-10-04 ENCOUNTER — OFFICE VISIT (OUTPATIENT)
Dept: INTERNAL MEDICINE | Facility: CLINIC | Age: 77
End: 2017-10-04
Payer: COMMERCIAL

## 2017-10-04 VITALS
SYSTOLIC BLOOD PRESSURE: 128 MMHG | DIASTOLIC BLOOD PRESSURE: 78 MMHG | BODY MASS INDEX: 34.36 KG/M2 | OXYGEN SATURATION: 97 % | WEIGHT: 221 LBS | HEART RATE: 91 BPM | TEMPERATURE: 97.6 F

## 2017-10-04 DIAGNOSIS — E11.21 TYPE 2 DIABETES MELLITUS WITH DIABETIC NEPHROPATHY, WITHOUT LONG-TERM CURRENT USE OF INSULIN (H): Primary | ICD-10-CM

## 2017-10-04 DIAGNOSIS — K21.9 GASTROESOPHAGEAL REFLUX DISEASE WITHOUT ESOPHAGITIS: ICD-10-CM

## 2017-10-04 DIAGNOSIS — R10.31 ABDOMINAL PAIN, RIGHT LOWER QUADRANT: ICD-10-CM

## 2017-10-04 DIAGNOSIS — I10 HYPERTENSION GOAL BP (BLOOD PRESSURE) < 130/80: ICD-10-CM

## 2017-10-04 DIAGNOSIS — N18.2 CKD (CHRONIC KIDNEY DISEASE) STAGE 2, GFR 60-89 ML/MIN: ICD-10-CM

## 2017-10-04 PROCEDURE — 99214 OFFICE O/P EST MOD 30 MIN: CPT | Performed by: INTERNAL MEDICINE

## 2017-10-04 NOTE — NURSING NOTE
"Chief Complaint   Patient presents with     Diabetes       Initial /78  Pulse 91  Temp 97.6  F (36.4  C) (Oral)  Wt 221 lb (100.2 kg)  SpO2 97%  BMI 34.36 kg/m2 Estimated body mass index is 34.36 kg/(m^2) as calculated from the following:    Height as of 5/31/17: 5' 7.25\" (1.708 m).    Weight as of this encounter: 221 lb (100.2 kg).  Medication Reconciliation: complete   Suze RAM CMA ([facility])      "

## 2017-10-04 NOTE — MR AVS SNAPSHOT
After Visit Summary   10/4/2017    Zoey Romo    MRN: [id number]           Patient Information     Date Of Birth          1940        Visit Information        Provider Department      10/4/2017 12:30 PM Gaby Baekr MD [facility]        Today's Diagnoses     Type 2 diabetes mellitus with diabetic nephropathy, without long-term current use of insulin (H)    -  1    Gastroesophageal reflux disease without esophagitis        Hypertension goal BP (blood pressure) < 130/80        CKD (chronic kidney disease) stage 2, GFR 60-89 ml/min        BMI 34.0-34.9,adult        Abdominal pain, right lower quadrant          Care Instructions    Keep an eye out for a bulge or worsening pain in your right groin.  If pain persists then I will need a CT of the abdomen.     Follow up with me in 6 months.    At your visit today, we discussed your risk for falls and preventive options.    Fall Prevention  Falls often occur due to slipping, tripping or losing your balance. Millions of people fall every year and injure themselves. Here are ways to reduce your risk of falling again.    Think about your fall, was there anything that caused your fall that can be fixed, removed, or replaced?    Make your home safe by keeping walkways clear of objects you may trip over.    Use non-slip pads under rugs. Do not use area rugs or small throw rugs.    Use non-slip mats in bathtubs and showers.    Install handrails and lights on staircases.    Do not walk in poorly lit areas.    Do not stand on chairs or wobbly ladders.    Use caution when reaching overhead or looking upward. This position can cause a loss of balance.    Be sure your shoes fit properly, have non-slip bottoms and are in good condition.     Wear shoes both inside and out. Avoid going barefoot or wearing slippers.    Be cautious when going up and down stairs, curbs, and when walking on uneven sidewalks.    If your balance is poor, consider using  a cane or walker.    If your fall was related to alcohol use, stop or limit alcohol intake.     If your fall was related to use of sleeping medicines, talk to your doctor about this. You may need to reduce your dosage at bedtime if you awaken during the night to go to the bathroom.      To reduce the need for nighttime bathroom trips:    Avoid drinking fluids for several hours before going to bed    Empty your bladder before going to bed    Men can keep a urinal at the bedside    Stay as active as you can. Balance, flexibility, strength, and endurance all come from exercise. They all play a role in preventing falls. Ask your healthcare provider which types of activity are right for you.    Get your vision checked on a regular basis.    If you have pets, know where they are before you stand up or walk so you don't trip over them.    Use night lights.  Date Last Reviewed: 11/5/2015 2000-2017 [employer]. [address] [place] [address]. All rights reserved. This information is not intended as a substitute for professional medical care. Always follow your healthcare professional's instructions.      [facility]    If you have any questions regarding to your visit please contact your care team:     Team Pink:   Clinic Hours Telephone Number   Internal Medicine:  Dr. Gaby Denise NP       7am-7pm  Monday - Thursday   7am-5pm  Fridays  [phone]  (Appointment scheduling available 24/7)    Questions about your visit?  Team Line  [phone]   Urgent Care - [place] - 11am-9pm Monday-Friday Saturday-Sunday- 9am-5pm   [place] - 5pm-9pm Monday-Friday Saturday-Sunday- 9am-5pm  [phone] - [place]   [phone] - [place]       What options do I have for visits at the clinic other than the traditional office visit?  To expand how we care for you, many of our providers are utilizing electronic visits  (e-visits) and telephone visits, when medically appropriate, for interactions with their patients rather than a visit in the clinic.   We also offer nurse visits for many medical concerns. Just like any other service, we will bill your insurance company for this type of visit based on time spent on the phone with your provider. Not all insurance companies cover these visits. Please check with your medical insurance if this type of visit is covered. You will be responsible for any charges that are not paid by your insurance.      E-visits via [employer]:  generally incur a $35.00 fee.  Telephone visits:  Time spent on the phone: *charged based on time that is spent on the phone in increments of 10 minutes. Estimated cost:   5-10 mins $30.00   11-20 mins. $59.00   21-30 mins. $85.00   Use [employer] (secure email communication and access to your chart) to send your primary care provider a message or make an appointment. Ask someone on your Team how to sign up for [employer].    For a Price Quote for your services, please call our [employer] Line at [phone].    As always, Thank you for trusting us with your health care needs!    Discharged by Suze RAM CMA ([facility])            Follow-ups after your visit        Who to contact     If you have questions or need follow up information about today's clinic visit or your schedule please contact [facility] directly at [phone].  Normal or non-critical lab and imaging results will be communicated to you by [employer], letter or phone within 4 business days after the clinic has received the results. If you do not hear from us within 7 days, please contact the clinic through [employer] or phone. If you have a critical or abnormal lab result, we will notify you by phone as soon as possible.  Submit refill requests through [employer] or call your pharmacy and they will forward the refill request to us. Please allow 3 business days for your refill to be completed.           Additional Information About Your Visit        MyChart Information     [employer] gives you secure access to your electronic health record. If you see a primary care provider, you can also send messages to your care team and make appointments. If you have questions, please call your primary care clinic.  If you do not have a primary care provider, please call [phone] and they will assist you.        Care EveryWhere ID     This is your Care EveryWhere ID. This could be used by other organizations to access your [facility] medical records  [id number]        Your Vitals Were     Pulse Temperature Pulse Oximetry BMI (Body Mass Index)          91 97.6  F (36.4  C) (Oral) 97% 34.36 kg/m2         Blood Pressure from Last 3 Encounters:   10/04/17 128/78   05/15/17 136/66   03/21/17 130/80    Weight from Last 3 Encounters:   10/04/17 221 lb (100.2 kg)   05/31/17 222 lb 12.8 oz (101.1 kg)   05/15/17 225 lb (102.1 kg)              Today, you had the following     No orders found for display       Primary Care Provider Office Phone # Fax #    Gaby Baker [phone]       [address] [facility] [address]        Equal Access to Services     MARYANNE HALL : Hadii aad ku hadasho Soomaali, waaxda luqadaha, qaybta kaalmada adeegyada, waxay idiin haynestorn alyce murray la'vilma ah. So [facility] [phone].    ATENCIÓN: Si habla español, tiene a muñoz disposición servicios gratuitos de asistencia lingüística. Llame al [phone].    We comply with applicable federal civil rights laws and [place] laws. We do not discriminate on the basis of race, color, national origin, age, disability, sex, sexual orientation, or gender identity.            Thank you!     Thank you for choosing [facility]  for your care. Our goal is always to provide you with excellent care. Hearing back from our patients is one way we can continue to improve our services. Please take a few minutes to complete the written survey  that you may receive in the mail after your visit with us. Thank you!             Your Updated Medication List - Protect others around you: Learn how to safely use, store and throw away your medicines at www.disposemymeds.org.          This list is accurate as of: 10/4/17  1:01 PM.  Always use your most recent med list.                   Brand Name Dispense Instructions for use Diagnosis    allopurinol 100 MG tablet    ZYLOPRIM    180 tablet    TAKE 1 TABLET (100 MG) BY MOUTH 2 TIMES DAILY    Chronic gout without tophus, unspecified cause, unspecified site       ammonium lactate 12 % cream    LAC-HYDRIN    385 g    Apply topically 2 times daily as needed for dry skin    Dermatitis       aspirin 81 MG tablet      Take 1 tablet by mouth daily.        blood glucose monitoring test strip    mSpot CONTOUR    1 Box    1 strip by In Vitro route daily Use to test blood sugars  daily or as directed.    Type 2 diabetes mellitus without complication, without long-term current use of insulin (H)       CALTRATE 600 + D 600-200 MG-IU Tabs      1 TABLET DAILY        chlorthalidone 25 MG tablet    HYGROTON    90 tablet    Take 1 tablet (25 mg) by mouth daily    HTN, goal below 130/80       diltiazem HCl COATED BEADS 240 MG Tb24    CARDIZEM LA    90 tablet    Take 240 mg by mouth daily    HTN, goal below 130/80       GLUCOSAMINE CHONDROITIN COMPLX PO           lisinopril 40 MG tablet    PRINIVIL/ZESTRIL    90 tablet    Take 1 tablet (40 mg) by mouth daily    Hypertension goal BP (blood pressure) < 130/80       metFORMIN 1000 MG tablet    GLUCOPHAGE    180 tablet    Take 1 tablet (1,000 mg) by mouth 2 times daily (with meals)    Type 2 diabetes mellitus without complication, without long-term current use of insulin (H)       mometasone 0.1 % cream    ELOCON    45 g    Apply sparingly to affected area twice daily as needed.  Do not apply to face.    Dermatitis       pravastatin 80 MG tablet    PRAVACHOL    90 tablet    Take 1 tablet  (80 mg) by mouth daily    Hyperlipidemia LDL goal <100

## 2017-10-04 NOTE — PATIENT INSTRUCTIONS
Keep an eye out for a bulge or worsening pain in your right groin.  If pain persists then I will need a CT of the abdomen.     Follow up with me in 6 months.    At your visit today, we discussed your risk for falls and preventive options.    Fall Prevention  Falls often occur due to slipping, tripping or losing your balance. Millions of people fall every year and injure themselves. Here are ways to reduce your risk of falling again.    Think about your fall, was there anything that caused your fall that can be fixed, removed, or replaced?    Make your home safe by keeping walkways clear of objects you may trip over.    Use non-slip pads under rugs. Do not use area rugs or small throw rugs.    Use non-slip mats in bathtubs and showers.    Install handrails and lights on staircases.    Do not walk in poorly lit areas.    Do not stand on chairs or wobbly ladders.    Use caution when reaching overhead or looking upward. This position can cause a loss of balance.    Be sure your shoes fit properly, have non-slip bottoms and are in good condition.     Wear shoes both inside and out. Avoid going barefoot or wearing slippers.    Be cautious when going up and down stairs, curbs, and when walking on uneven sidewalks.    If your balance is poor, consider using a cane or walker.    If your fall was related to alcohol use, stop or limit alcohol intake.     If your fall was related to use of sleeping medicines, talk to your doctor about this. You may need to reduce your dosage at bedtime if you awaken during the night to go to the bathroom.      To reduce the need for nighttime bathroom trips:    Avoid drinking fluids for several hours before going to bed    Empty your bladder before going to bed    Men can keep a urinal at the bedside    Stay as active as you can. Balance, flexibility, strength, and endurance all come from exercise. They all play a role in preventing falls. Ask your healthcare provider which types of activity  are right for you.    Get your vision checked on a regular basis.    If you have pets, know where they are before you stand up or walk so you don't trip over them.    Use night lights.  Date Last Reviewed: 11/5/2015 2000-2017 [employer]. [address] [place] [address]. All rights reserved. This information is not intended as a substitute for professional medical care. Always follow your healthcare professional's instructions.      [facility]    If you have any questions regarding to your visit please contact your care team:     Team Pink:   Clinic Hours Telephone Number   Internal Medicine:  Dr. Gaby Denise, NP       7am-7pm  Monday - Thursday   7am-5pm  Fridays  [phone]  (Appointment scheduling available 24/7)    Questions about your visit?  Team Line  [phone]   Urgent Care - [place] - 11am-9pm Monday-Friday Saturday-Sunday- 9am-5pm   [place] - 5pm-9pm Monday-Friday Saturday-Sunday- 9am-5pm  [phone] - [place]   [phone] - [place]       What options do I have for visits at the clinic other than the traditional office visit?  To expand how we care for you, many of our providers are utilizing electronic visits (e-visits) and telephone visits, when medically appropriate, for interactions with their patients rather than a visit in the clinic.   We also offer nurse visits for many medical concerns. Just like any other service, we will bill your insurance company for this type of visit based on time spent on the phone with your provider. Not all insurance companies cover these visits. Please check with your medical insurance if this type of visit is covered. You will be responsible for any charges that are not paid by your insurance.      E-visits via [employer]:  generally incur a $35.00 fee.  Telephone visits:  Time spent on the phone: *charged based on time that is spent on the phone  in increments of 10 minutes. Estimated cost:   5-10 mins $30.00   11-20 mins. $59.00   21-30 mins. $85.00   Use [employer] (secure email communication and access to your chart) to send your primary care provider a message or make an appointment. Ask someone on your Team how to sign up for [employer].    For a Price Quote for your services, please call our [employer] Line at [phone].    As always, Thank you for trusting us with your health care needs!    Discharged by Suze RAM CMA ([facility])

## 2018-03-05 DIAGNOSIS — E11.21 TYPE 2 DIABETES MELLITUS WITH DIABETIC NEPHROPATHY, WITHOUT LONG-TERM CURRENT USE OF INSULIN (H): ICD-10-CM

## 2018-03-05 DIAGNOSIS — M1A.9XX0 CHRONIC GOUT WITHOUT TOPHUS, UNSPECIFIED CAUSE, UNSPECIFIED SITE: ICD-10-CM

## 2018-03-05 DIAGNOSIS — N18.2 CKD (CHRONIC KIDNEY DISEASE) STAGE 2, GFR 60-89 ML/MIN: ICD-10-CM

## 2018-03-05 DIAGNOSIS — I10 HYPERTENSION GOAL BP (BLOOD PRESSURE) < 130/80: ICD-10-CM

## 2018-03-05 DIAGNOSIS — I71.40 ABDOMINAL AORTIC ANEURYSM (AAA) WITHOUT RUPTURE (H): Primary | ICD-10-CM

## 2018-03-05 DIAGNOSIS — E78.5 HYPERLIPIDEMIA LDL GOAL <100: ICD-10-CM

## 2018-03-05 DIAGNOSIS — E83.52 HYPERCALCEMIA: ICD-10-CM

## 2018-03-08 ENCOUNTER — RADIANT APPOINTMENT (OUTPATIENT)
Dept: ULTRASOUND IMAGING | Facility: CLINIC | Age: 78
End: 2018-03-08
Attending: INTERNAL MEDICINE
Payer: COMMERCIAL

## 2018-03-08 DIAGNOSIS — N18.2 CKD (CHRONIC KIDNEY DISEASE) STAGE 2, GFR 60-89 ML/MIN: ICD-10-CM

## 2018-03-08 DIAGNOSIS — E78.5 HYPERLIPIDEMIA LDL GOAL <100: ICD-10-CM

## 2018-03-08 DIAGNOSIS — I10 HYPERTENSION GOAL BP (BLOOD PRESSURE) < 130/80: ICD-10-CM

## 2018-03-08 DIAGNOSIS — M1A.9XX0 CHRONIC GOUT WITHOUT TOPHUS, UNSPECIFIED CAUSE, UNSPECIFIED SITE: ICD-10-CM

## 2018-03-08 DIAGNOSIS — I71.40 ABDOMINAL AORTIC ANEURYSM (AAA) WITHOUT RUPTURE (H): ICD-10-CM

## 2018-03-08 DIAGNOSIS — E11.21 TYPE 2 DIABETES MELLITUS WITH DIABETIC NEPHROPATHY, WITHOUT LONG-TERM CURRENT USE OF INSULIN (H): ICD-10-CM

## 2018-03-08 DIAGNOSIS — E83.52 HYPERCALCEMIA: ICD-10-CM

## 2018-03-08 PROCEDURE — 76775 US EXAM ABDO BACK WALL LIM: CPT

## 2018-03-18 ENCOUNTER — MYC MEDICAL ADVICE (OUTPATIENT)
Dept: INTERNAL MEDICINE | Facility: CLINIC | Age: 78
End: 2018-03-18

## 2018-03-18 DIAGNOSIS — E11.9 TYPE 2 DIABETES MELLITUS WITHOUT COMPLICATION, WITHOUT LONG-TERM CURRENT USE OF INSULIN (H): ICD-10-CM

## 2018-03-19 NOTE — TELEPHONE ENCOUNTER
Routing refill request to provider for review/approval because:  Drug not on the G refill protocol   Patient will be out of medication before OV with PCP on 4/18/2018.    Requested Prescriptions   Pending Prescriptions Disp Refills     metFORMIN (GLUCOPHAGE) 1000 MG tablet 180 tablet 1     Sig: Take 1 tablet (1,000 mg) by mouth 2 times daily (with meals)    There is no refill protocol information for this order        Kristin Padilla RN

## 2018-04-11 RX ORDER — AMMONIUM LACTATE 12 G/100G
CREAM TOPICAL 2 TIMES DAILY PRN
Qty: 385 G | Refills: 3 | Status: CANCELLED | OUTPATIENT
Start: 2018-04-11

## 2018-04-11 RX ORDER — MOMETASONE FUROATE 1 MG/G
CREAM TOPICAL
Qty: 45 G | Refills: 3 | Status: CANCELLED | OUTPATIENT
Start: 2018-04-11

## 2018-04-18 ENCOUNTER — OFFICE VISIT (OUTPATIENT)
Dept: INTERNAL MEDICINE | Facility: CLINIC | Age: 78
End: 2018-04-18
Payer: COMMERCIAL

## 2018-04-18 VITALS
DIASTOLIC BLOOD PRESSURE: 72 MMHG | OXYGEN SATURATION: 97 % | RESPIRATION RATE: 16 BRPM | HEIGHT: 67 IN | SYSTOLIC BLOOD PRESSURE: 120 MMHG | HEART RATE: 77 BPM | BODY MASS INDEX: 34.69 KG/M2 | WEIGHT: 221 LBS | TEMPERATURE: 97.2 F

## 2018-04-18 DIAGNOSIS — M1A.9XX0 CHRONIC GOUT WITHOUT TOPHUS, UNSPECIFIED CAUSE, UNSPECIFIED SITE: ICD-10-CM

## 2018-04-18 DIAGNOSIS — I10 HYPERTENSION GOAL BP (BLOOD PRESSURE) < 130/80: ICD-10-CM

## 2018-04-18 DIAGNOSIS — Z00.00 ROUTINE GENERAL MEDICAL EXAMINATION AT A HEALTH CARE FACILITY: Primary | ICD-10-CM

## 2018-04-18 DIAGNOSIS — N18.2 CKD (CHRONIC KIDNEY DISEASE) STAGE 2, GFR 60-89 ML/MIN: ICD-10-CM

## 2018-04-18 DIAGNOSIS — E78.5 HYPERLIPIDEMIA LDL GOAL <100: ICD-10-CM

## 2018-04-18 DIAGNOSIS — E83.52 HYPERCALCEMIA: ICD-10-CM

## 2018-04-18 DIAGNOSIS — E11.21 TYPE 2 DIABETES MELLITUS WITH DIABETIC NEPHROPATHY, WITHOUT LONG-TERM CURRENT USE OF INSULIN (H): ICD-10-CM

## 2018-04-18 DIAGNOSIS — I10 HTN, GOAL BELOW 130/80: ICD-10-CM

## 2018-04-18 DIAGNOSIS — D22.9 MULTIPLE NEVI: ICD-10-CM

## 2018-04-18 DIAGNOSIS — E11.9 TYPE 2 DIABETES MELLITUS WITHOUT COMPLICATION, WITHOUT LONG-TERM CURRENT USE OF INSULIN (H): ICD-10-CM

## 2018-04-18 DIAGNOSIS — I71.40 ABDOMINAL AORTIC ANEURYSM (AAA) WITHOUT RUPTURE (H): ICD-10-CM

## 2018-04-18 LAB
ALBUMIN SERPL-MCNC: 3.7 G/DL (ref 3.4–5)
ALP SERPL-CCNC: 28 U/L (ref 40–150)
ALT SERPL W P-5'-P-CCNC: 25 U/L (ref 0–70)
ANION GAP SERPL CALCULATED.3IONS-SCNC: 8 MMOL/L (ref 3–14)
AST SERPL W P-5'-P-CCNC: 17 U/L (ref 0–45)
BILIRUB SERPL-MCNC: 0.6 MG/DL (ref 0.2–1.3)
BUN SERPL-MCNC: 21 MG/DL (ref 7–30)
CALCIUM SERPL-MCNC: 10.3 MG/DL (ref 8.5–10.1)
CHLORIDE SERPL-SCNC: 101 MMOL/L (ref 94–109)
CHOLEST SERPL-MCNC: 150 MG/DL
CK SERPL-CCNC: 87 U/L (ref 30–300)
CO2 SERPL-SCNC: 28 MMOL/L (ref 20–32)
CREAT SERPL-MCNC: 0.98 MG/DL (ref 0.66–1.25)
CREAT UR-MCNC: 108 MG/DL
GFR SERPL CREATININE-BSD FRML MDRD: 73 ML/MIN/1.7M2
GLUCOSE SERPL-MCNC: 117 MG/DL (ref 70–99)
HBA1C MFR BLD: 6.4 % (ref 0–5.6)
HDLC SERPL-MCNC: 55 MG/DL
LDLC SERPL CALC-MCNC: 67 MG/DL
MICROALBUMIN UR-MCNC: 234 MG/L
MICROALBUMIN/CREAT UR: 216.67 MG/G CR (ref 0–17)
NONHDLC SERPL-MCNC: 95 MG/DL
POTASSIUM SERPL-SCNC: 3.9 MMOL/L (ref 3.4–5.3)
PROT SERPL-MCNC: 7.7 G/DL (ref 6.8–8.8)
SODIUM SERPL-SCNC: 137 MMOL/L (ref 133–144)
TRIGL SERPL-MCNC: 140 MG/DL
TSH SERPL DL<=0.005 MIU/L-ACNC: 2.21 MU/L (ref 0.4–4)
URATE SERPL-MCNC: 4.7 MG/DL (ref 3.5–7.2)

## 2018-04-18 PROCEDURE — 83036 HEMOGLOBIN GLYCOSYLATED A1C: CPT | Performed by: INTERNAL MEDICINE

## 2018-04-18 PROCEDURE — 36415 COLL VENOUS BLD VENIPUNCTURE: CPT | Performed by: INTERNAL MEDICINE

## 2018-04-18 PROCEDURE — 82043 UR ALBUMIN QUANTITATIVE: CPT | Performed by: INTERNAL MEDICINE

## 2018-04-18 PROCEDURE — G0439 PPPS, SUBSEQ VISIT: HCPCS | Performed by: INTERNAL MEDICINE

## 2018-04-18 PROCEDURE — 82550 ASSAY OF CK (CPK): CPT | Performed by: INTERNAL MEDICINE

## 2018-04-18 PROCEDURE — 84443 ASSAY THYROID STIM HORMONE: CPT | Performed by: INTERNAL MEDICINE

## 2018-04-18 PROCEDURE — 80061 LIPID PANEL: CPT | Performed by: INTERNAL MEDICINE

## 2018-04-18 PROCEDURE — 80053 COMPREHEN METABOLIC PANEL: CPT | Performed by: INTERNAL MEDICINE

## 2018-04-18 PROCEDURE — 84550 ASSAY OF BLOOD/URIC ACID: CPT | Performed by: INTERNAL MEDICINE

## 2018-04-18 PROCEDURE — 99213 OFFICE O/P EST LOW 20 MIN: CPT | Mod: 25 | Performed by: INTERNAL MEDICINE

## 2018-04-18 RX ORDER — ALLOPURINOL 100 MG/1
TABLET ORAL
Qty: 180 TABLET | Refills: 3 | Status: SHIPPED | OUTPATIENT
Start: 2018-04-18 | End: 2019-04-26

## 2018-04-18 RX ORDER — CHLORTHALIDONE 25 MG/1
25 TABLET ORAL DAILY
Qty: 90 TABLET | Refills: 4 | Status: SHIPPED | OUTPATIENT
Start: 2018-04-18 | End: 2019-04-26

## 2018-04-18 RX ORDER — PRAVASTATIN SODIUM 80 MG/1
80 TABLET ORAL DAILY
Qty: 90 TABLET | Refills: 3 | Status: SHIPPED | OUTPATIENT
Start: 2018-04-18 | End: 2019-04-26

## 2018-04-18 RX ORDER — LISINOPRIL 40 MG/1
40 TABLET ORAL DAILY
Qty: 90 TABLET | Refills: 3 | Status: SHIPPED | OUTPATIENT
Start: 2018-04-18 | End: 2019-04-26

## 2018-04-18 ASSESSMENT — PAIN SCALES - GENERAL: PAINLEVEL: NO PAIN (0)

## 2018-04-18 NOTE — NURSING NOTE
"Chief Complaint   Patient presents with     Physical     pt is fasting     Health Maintenance     PHQ2, TSH, eye exam, lipids, A1C, microalbumin       Initial /72 (BP Location: Left arm, Cuff Size: Adult Regular)  Pulse 77  Temp 97.2  F (36.2  C) (Oral)  Resp 16  Ht 5' 7.25\" (1.708 m)  Wt 221 lb (100.2 kg)  SpO2 97%  BMI 34.36 kg/m2 Estimated body mass index is 34.36 kg/(m^2) as calculated from the following:    Height as of this encounter: 5' 7.25\" (1.708 m).    Weight as of this encounter: 221 lb (100.2 kg).  Medication Reconciliation: complete       Anita Polanco CMA    "

## 2018-04-18 NOTE — PROGRESS NOTES
INTERNAL MEDICINE  SUBJECTIVE:   Zoey Romo is a 78 year old male who presents for Preventive Visit.  Are you in the first 12 months of your Medicare Part B coverage?  No    Healthy Habits:    Do you get at least three servings of calcium containing foods daily (dairy, green leafy vegetables, etc.)? yes    Amount of exercise or daily activities, outside of work: 7 day(s) per week    Problems taking medications regularly No    Medication side effects: No    Have you had an eye exam in the past two years? yes    Do you see a dentist twice per year? yes    Do you have sleep apnea, excessive snoring or daytime drowsiness?yes, sleep apnea but CPAP no longer works      Ability to successfully perform activities of daily living: Yes, no assistance needed    Home safety:  none identified     Hearing impairment: No    Fall risk:  Fallen 2 or more times in the past year?: No  Any fall with injury in the past year?: Yes    COGNITIVE SCREEN  1) Repeat 3 items (Banana, Sunrise, Chair)    2) Clock draw: NORMAL  3) 3 item recall: Recalls 3 objects  Results: 3 items recalled: COGNITIVE IMPAIRMENT LESS LIKELY  Mini-CogTM Copyright S Farrukh. Licensed by the author for use in [facility]; reprinted with permission ([email or web address]@[email or web address]). All rights reserved.      Diabetes - Only taking his blood glucose in the mornins-130s.     Abdominal pain - He still has intermittent abdominal pain and wonders if it is radiating from his hips. He feels discomfort after using the . Denies abdominal bulging.    Skin - He has a dark mole on his face and wonders if he should follow up with dermatology.    Additional notes:   Denies any gout flares   AAA is unchanged  Declines shingles shot  Not jogging anymore  Taking Caltrate and glucosamine      Reviewed and updated as needed this visit by clinical staff  Tobacco  Allergies  Meds  Med Hx  Surg Hx  Fam Hx  Soc Hx        Reviewed and updated as needed this visit by  Provider        Social History   Substance Use Topics     Smoking status: Former Smoker     Packs/day: 0.50     Years: 10.00     Types: Cigars     Quit date: 1/20/1985     Smokeless tobacco: Never Used     Alcohol use 3.5 oz/week      Comment: 2 per month       If you drink alcohol do you typically have >3 drinks per day or >7 drinks per week? No                        Today's PHQ-2 Score:   PHQ-2 ( 1999 [employer]) 5/15/2017 1/15/2016   Q1: Little interest or pleasure in doing things 0 0   Q2: Feeling down, depressed or hopeless 0 0   PHQ-2 Score 0 0       Do you feel safe in your environment - Yes  Do you have a Health Care Directive?: Yes: Advance Directive has been received and scanned.    Current providers sharing in care for this patient include:   Patient Care Team:  Gaby Baker MD as PCP - General    The following health maintenance items are reviewed in Epic and correct as of today:  Health Maintenance   Topic Date Due     INFLUENZA VACCINE (1) 09/01/2017     TSH W/ FREE T4 REFLEX Q2 YEAR  01/15/2018     LIPID MONITORING Q1 YEAR  03/21/2018     MICROALBUMIN Q1 YEAR  03/21/2018     A1C Q6 MO  03/27/2018     EYE EXAM Q1 YEAR  05/03/2018     BMP Q1 YR  09/27/2018     FOOT EXAM Q1 YEAR  10/04/2018     FALL RISK ASSESSMENT  10/04/2018     COLONOSCOPY Q3 YR  05/03/2019     ADVANCE DIRECTIVE PLANNING Q5 YRS  03/21/2022     TETANUS IMMUNIZATION (SYSTEM ASSIGNED)  02/01/2023     PNEUMOCOCCAL  Completed     Labs reviewed in EPIC    Pneumonia Vaccine: COMPLETED    ROS:  ROS: 10 point ROS neg other than the symptoms noted above in the HPI.    This document serves as a record of the services and decisions personally performed and made by Gaby Baker MD. It was created on his/her behalf by Farzaneh Bradford trained medical scribe. The creation of this document is based the provider's statements to the medical scribes.    Fern Bradford 10:12 AM, April 18, 2018  OBJECTIVE:   /72 (BP Location: Left arm, Cuff Size:  "Adult Regular)  Pulse 77  Temp 97.2  F (36.2  C) (Oral)  Resp 16  Ht 5' 7.25\" (1.708 m)  Wt 221 lb (100.2 kg)  SpO2 97%  BMI 34.36 kg/m2 Estimated body mass index is 34.36 kg/(m^2) as calculated from the following:    Height as of this encounter: 5' 7.25\" (1.708 m).    Weight as of this encounter: 221 lb (100.2 kg).  EXAM:   GENERAL: healthy, alert and no distress  EYES: Eyes grossly normal to inspection, PERRL and conjunctivae and sclerae normal  HENT: ear canals and TM's normal, nose and mouth without ulcers or lesions  NECK: no adenopathy, no asymmetry, masses, or scars and thyroid normal to palpation  RESP: lungs clear to auscultation - no rales, rhonchi or wheezes  CV: regular rate and rhythm, normal S1 S2, no S3 or S4, no murmur, click or rub, no peripheral edema and peripheral pulses strong  ABDOMEN: soft, nontender, no hepatosplenomegaly, no masses and bowel sounds normal, diastasis recti  MS: no gross musculoskeletal defects noted, no edema, normal hip range of motion bilaterally  SKIN: no suspicious lesions or rashes  NEURO: Normal strength and tone, mentation intact and speech normal  PSYCH: mentation appears normal, affect normal/bright    ASSESSMENT / PLAN:   1. HTN, goal below 130/80  Controlled.   - diltiazem HCl COATED BEADS (CARDIZEM LA) 240 MG TB24; Take 240 mg by mouth daily  Dispense: 90 tablet; Refill: 4  - chlorthalidone (HYGROTON) 25 MG tablet; Take 1 tablet (25 mg) by mouth daily  Dispense: 90 tablet; Refill: 4    2. Chronic gout without tophus, unspecified cause, unspecified site  No gout attacks   - allopurinol (ZYLOPRIM) 100 MG tablet; TAKE 1 TABLET (100 MG) BY MOUTH 2 TIMES DAILY  Dispense: 180 tablet; Refill: 3  - **A1C FUTURE anytime  - **Comprehensive metabolic panel FUTURE anytime  - Lipid panel reflex to direct LDL Fasting  - Albumin Random Urine Quantitative with Creat Ratio  - **TSH with free T4 reflex FUTURE anytime  - CK total  - Uric acid    3. Hypertension goal BP (blood " pressure) < 130/80  Controlled.  - lisinopril (PRINIVIL/ZESTRIL) 40 MG tablet; Take 1 tablet (40 mg) by mouth daily  Dispense: 90 tablet; Refill: 3  - **A1C FUTURE anytime  - **Comprehensive metabolic panel FUTURE anytime  - Lipid panel reflex to direct LDL Fasting  - Albumin Random Urine Quantitative with Creat Ratio  - **TSH with free T4 reflex FUTURE anytime  - CK total  - Uric acid    4. Hyperlipidemia LDL goal <100    - pravastatin (PRAVACHOL) 80 MG tablet; Take 1 tablet (80 mg) by mouth daily  Dispense: 90 tablet; Refill: 3  - **A1C FUTURE anytime  - **Comprehensive metabolic panel FUTURE anytime  - Lipid panel reflex to direct LDL Fasting  - Albumin Random Urine Quantitative with Creat Ratio  - **TSH with free T4 reflex FUTURE anytime  - CK total  - Uric acid    5. Type 2 diabetes mellitus without complication, without long-term current use of insulin (H)  The current medical regimen is effective;  continue present plan and medications.         7. Routine general medical examination at a health care facility      8. Abdominal aortic aneurysm (AAA) without rupture (H)  Unchanged.   - **A1C FUTURE anytime  - **Comprehensive metabolic panel FUTURE anytime  - Lipid panel reflex to direct LDL Fasting  - Albumin Random Urine Quantitative with Creat Ratio  - **TSH with free T4 reflex FUTURE anytime  - CK total  - Uric acid    9. CKD (chronic kidney disease) stage 2, GFR 60-89 ml/min    - **A1C FUTURE anytime  - **Comprehensive metabolic panel FUTURE anytime  - Lipid panel reflex to direct LDL Fasting  - Albumin Random Urine Quantitative with Creat Ratio  - **TSH with free T4 reflex FUTURE anytime  - CK total  - Uric acid    10. Hypercalcemia  Advised patient to stop taking calcium supplements.  Thiazide diuretic is also contributing.   - **A1C FUTURE anytime  - **Comprehensive metabolic panel FUTURE anytime  - Lipid panel reflex to direct LDL Fasting  - Albumin Random Urine Quantitative with Creat Ratio  - **TSH  "with free T4 reflex FUTURE anytime  - CK total  - Uric acid    11. Type 2 diabetes mellitus with diabetic nephropathy, without long-term current use of insulin (H)    - **A1C FUTURE anytime  - **Comprehensive metabolic panel FUTURE anytime  - Lipid panel reflex to direct LDL Fasting  - Albumin Random Urine Quantitative with Creat Ratio  - **TSH with free T4 reflex FUTURE anytime  - CK total  - Uric acid  - Albumin Random Urine Quantitative with Creat Ratio    12. Multiple nevi    - DERMATOLOGY REFERRAL    End of Life Planning:  Patient currently has an advanced directive: Yes.  Practitioner is supportive of decision.    COUNSELING:  Reviewed preventive health counseling, as reflected in patient instructions  Special attention given to:       Consider AAA screening for ages 65-75 and smoking history       Regular exercise       Healthy diet/nutrition    Estimated body mass index is 34.36 kg/(m^2) as calculated from the following:    Height as of this encounter: 5' 7.25\" (1.708 m).    Weight as of this encounter: 221 lb (100.2 kg).  Weight management plan: Discussed healthy diet and exercise guidelines and patient will follow up in 12 months in clinic to re-evaluate.   reports that he quit smoking about 33 years ago. His smoking use included Cigars. He has a 5.00 pack-year smoking history. He has never used smokeless tobacco.    Appropriate preventive services were discussed with this patient, including applicable screening as appropriate for cardiovascular disease, diabetes, osteopenia/osteoporosis, and glaucoma.  As appropriate for age/gender, discussed screening for colorectal cancer, prostate cancer, breast cancer, and cervical cancer. Checklist reviewing preventive services available has been given to the patient.    Reviewed patients plan of care and provided an AVS. The Basic Care Plan (routine screening as documented in Health Maintenance) for Zoey meets the Care Plan requirement. This Care Plan has been " established and reviewed with the Patient.    Counseling Resources:  ATP IV Guidelines  Pooled Cohorts Equation Calculator  Breast Cancer Risk Calculator  FRAX Risk Assessment  ICSI Preventive Guidelines  Dietary Guidelines for Americans, 2010  USDA's MyPlate  ASA Prophylaxis  Lung CA Screening    Patient Instructions  Schedule a follow up with dermatology.    Stop taking calcium.    You can take vitamin D if you like.    Follow up in 6 months.      Preventive Health Recommendations:       Male Ages 65 and over    Yearly exam:             See your health care provider every year in order to  o   Review health changes.   o   Discuss preventive care.    o   Review your medicines if your doctor has prescribed any.    Talk with your health care provider about whether you should have a test to screen for prostate cancer (PSA).    Every 3 years, have a diabetes test (fasting glucose). If you are at risk for diabetes, you should have this test more often.    Every 5 years, have a cholesterol test. Have this test more often if you are at risk for high cholesterol or heart disease.     Every 10 years, have a colonoscopy. Or, have a yearly FIT test (stool test). These exams will check for colon cancer.    Talk to with your health care provider about screening for Abdominal Aortic Aneurysm if you have a family history of AAA or have a history of smoking.  Shots:     Get a flu shot each year.     Get a tetanus shot every 10 years.     Talk to your doctor about your pneumonia vaccines. There are now two you should receive - Pneumovax (PPSV 23) and Prevnar (PCV 13).    Talk to your doctor about a shingles vaccine.     Talk to your doctor about the hepatitis B vaccine.  Nutrition:     Eat at least 5 servings of fruits and vegetables each day.     Eat whole-grain bread, whole-wheat pasta and brown rice instead of white grains and rice.     Talk to your doctor about Calcium and Vitamin D.   Lifestyle    Exercise for at least 150  minutes a week (30 minutes a day, 5 days a week). This will help you control your weight and prevent disease.     Limit alcohol to one drink per day.     No smoking.     Wear sunscreen to prevent skin cancer.     See your dentist every six months for an exam and cleaning.     See your eye doctor every 1 to 2 years to screen for conditions such as glaucoma, macular degeneration and cataracts.      I spent 23 minutes of time with the patient and >50% of it was in education and counseling regarding preventive health.    The information in this document, created by the medical scribe, Farzaneh Bradford, for me, accurately reflects the services I personally performed and the decisions made by me. I have reviewed and approved this document for accuracy prior to leaving the patient care area.    Gaby Baker MD  [facility]    Start 10:11 AM  End 10:34 AM

## 2018-04-18 NOTE — MR AVS SNAPSHOT
After Visit Summary   4/18/2018    Zoey Romo    MRN: [id number]           Patient Information     Date Of Birth          1940        Visit Information        Provider Department      4/18/2018 10:00 AM Gaby Baker MD [facility]        Today's Diagnoses     Routine general medical examination at a health care facility    -  1    HTN, goal below 130/80        Chronic gout without tophus, unspecified cause, unspecified site        Hypertension goal BP (blood pressure) < 130/80        Hyperlipidemia LDL goal <100        Type 2 diabetes mellitus without complication, without long-term current use of insulin (H)        Dermatitis        Abdominal aortic aneurysm (AAA) without rupture (H)        CKD (chronic kidney disease) stage 2, GFR 60-89 ml/min        Hypercalcemia        Type 2 diabetes mellitus with diabetic nephropathy, without long-term current use of insulin (H)        Multiple nevi          Care Instructions    Schedule a follow up with dermatology.    Stop taking calcium.    You can take vitamin D if you like.    Follow up in 6 months.      Preventive Health Recommendations:       Male Ages 65 and over    Yearly exam:             See your health care provider every year in order to  o   Review health changes.   o   Discuss preventive care.    o   Review your medicines if your doctor has prescribed any.    Talk with your health care provider about whether you should have a test to screen for prostate cancer (PSA).    Every 3 years, have a diabetes test (fasting glucose). If you are at risk for diabetes, you should have this test more often.    Every 5 years, have a cholesterol test. Have this test more often if you are at risk for high cholesterol or heart disease.     Every 10 years, have a colonoscopy. Or, have a yearly FIT test (stool test). These exams will check for colon cancer.    Talk to with your health care provider about screening for Abdominal Aortic  Aneurysm if you have a family history of AAA or have a history of smoking.  Shots:     Get a flu shot each year.     Get a tetanus shot every 10 years.     Talk to your doctor about your pneumonia vaccines. There are now two you should receive - Pneumovax (PPSV 23) and Prevnar (PCV 13).    Talk to your doctor about a shingles vaccine.     Talk to your doctor about the hepatitis B vaccine.  Nutrition:     Eat at least 5 servings of fruits and vegetables each day.     Eat whole-grain bread, whole-wheat pasta and brown rice instead of white grains and rice.     Talk to your doctor about Calcium and Vitamin D.   Lifestyle    Exercise for at least 150 minutes a week (30 minutes a day, 5 days a week). This will help you control your weight and prevent disease.     Limit alcohol to one drink per day.     No smoking.     Wear sunscreen to prevent skin cancer.     See your dentist every six months for an exam and cleaning.     See your eye doctor every 1 to 2 years to screen for conditions such as glaucoma, macular degeneration and cataracts.    [facility]    If you have any questions regarding to your visit please contact your care team:     Team Pink:   Clinic Hours Telephone Number   Internal Medicine:  Dr. Gaby Denise, NP       7am-7pm  Monday - Thursday   7am-5pm  Fridays  [phone]  (Appointment scheduling available 24/7)    Questions about your visit?  Team Line  [phone]   Urgent Care - [place] - 11am-9pm Monday-Friday Saturday-Sunday- 9am-5pm   [place] - 5pm-9pm Monday-Friday Saturday-Sunday- 9am-5pm  [phone] - [place]   [phone] - [place]       What options do I have for visits at the clinic other than the traditional office visit?  To expand how we care for you, many of our providers are utilizing electronic visits (e-visits) and telephone visits, when medically appropriate, for interactions with their  patients rather than a visit in the clinic.   We also offer nurse visits for many medical concerns. Just like any other service, we will bill your insurance company for this type of visit based on time spent on the phone with your provider. Not all insurance companies cover these visits. Please check with your medical insurance if this type of visit is covered. You will be responsible for any charges that are not paid by your insurance.      E-visits via [employer]:  generally incur a $35.00 fee.  Telephone visits:  Time spent on the phone: *charged based on time that is spent on the phone in increments of 10 minutes. Estimated cost:   5-10 mins $30.00   11-20 mins. $59.00   21-30 mins. $85.00   Use LetGive (secure email communication and access to your chart) to send your primary care provider a message or make an appointment. Ask someone on your Team how to sign up for LetGive.    For a Price Quote for your services, please call our [place] Line at [phone].    As always, Thank you for trusting us with your health care needs!    Anita Polanco, ALYSHA          Follow-ups after your visit        Additional Services     DERMATOLOGY REFERRAL       Your provider has referred you to: [facility]: [facility] [place] [phone]   [email or web address]    Please be aware that coverage of these services is subject to the terms and limitations of your health insurance plan.  Call member services at your health plan with any benefit or coverage questions.      Please bring the following with you to your appointment:    (1) Any X-Rays, CTs or MRIs which have been performed.  Contact the facility where they were done to arrange for  prior to your scheduled appointment.    (2) List of current medications  (3) This referral request   (4) Any documents/labs given to you for this referral                  Who to contact     If you have questions or need follow up information about today's clinic  "visit or your schedule please contact [facility] directly at [phone].  Normal or non-critical lab and imaging results will be communicated to you by MyChart, letter or phone within 4 business days after the clinic has received the results. If you do not hear from us within 7 days, please contact the clinic through [employer] or phone. If you have a critical or abnormal lab result, we will notify you by phone as soon as possible.  Submit refill requests through [employer] or call your pharmacy and they will forward the refill request to us. Please allow 3 business days for your refill to be completed.          Additional Information About Your Visit        [employer] Information     [employer] gives you secure access to your electronic health record. If you see a primary care provider, you can also send messages to your care team and make appointments. If you have questions, please call your primary care clinic.  If you do not have a primary care provider, please call [phone] and they will assist you.        Care EveryWhere ID     This is your Care EveryWhere ID. This could be used by other organizations to access your [facility] medical records  [id number]        Your Vitals Were     Pulse Temperature Respirations Height Pulse Oximetry BMI (Body Mass Index)    77 97.2  F (36.2  C) (Oral) 16 5' 7.25\" (1.708 m) 97% 34.36 kg/m2       Blood Pressure from Last 3 Encounters:   04/18/18 120/72   10/04/17 128/78   05/15/17 136/66    Weight from Last 3 Encounters:   04/18/18 221 lb (100.2 kg)   10/04/17 221 lb (100.2 kg)   05/31/17 222 lb 12.8 oz (101.1 kg)              We Performed the Following     **A1C FUTURE anytime     **Comprehensive metabolic panel FUTURE anytime     **TSH with free T4 reflex FUTURE anytime     Albumin Random Urine Quantitative with Creat Ratio     Albumin Random Urine Quantitative with Creat Ratio     CK total     DERMATOLOGY REFERRAL     Lipid panel reflex to direct LDL Fasting     Uric " acid          Today's Medication Changes          These changes are accurate as of 4/18/18 10:34 AM.  If you have any questions, ask your nurse or doctor.               Stop taking these medicines if you haven't already. Please contact your care team if you have questions.     CALTRATE 600 + D 600-200 MG-IU Tabs   Stopped by:  Gaby Baker MD                Where to get your medicines      These medications were sent to [facility] - [address]  [address] [place] [address]     Phone:  [phone]     allopurinol 100 MG tablet    chlorthalidone 25 MG tablet    diltiazem HCl COATED BEADS 240 MG Tb24    lisinopril 40 MG tablet    pravastatin 80 MG tablet                Primary Care Provider Office Phone # Fax #    Gaby Baker [phone] [address] [facility] [address]        Equal Access to Services     [facility]: Hadii aad ku hadasho Soomaali, waaxda luqadaha, qaybta kaalmada adeegyada, waxay idiin hayaan adeeg khararadhames la'aan . So [facility] [place] [phone].    ATENCIÓN: Si habla español, tiene a muñoz disposición servicios gratuitos de asistencia lingüística. Carmen al [phone].    We comply with applicable federal civil rights laws and [place] laws. We do not discriminate on the basis of race, color, national origin, age, disability, sex, sexual orientation, or gender identity.            Thank you!     Thank you for choosing [facility]  for your care. Our goal is always to provide you with excellent care. Hearing back from our patients is one way we can continue to improve our services. Please take a few minutes to complete the written survey that you may receive in the mail after your visit with us. Thank you!             Your Updated Medication List - Protect others around you: Learn how to safely use, store and throw away your medicines at www.disposemymeds.org.          This list is accurate as  of 4/18/18 10:34 AM.  Always use your most recent med list.                   Brand Name Dispense Instructions for use Diagnosis    allopurinol 100 MG tablet    ZYLOPRIM    180 tablet    TAKE 1 TABLET (100 MG) BY MOUTH 2 TIMES DAILY    Chronic gout without tophus, unspecified cause, unspecified site       ammonium lactate 12 % cream    LAC-HYDRIN    385 g    Apply topically 2 times daily as needed for dry skin    Dermatitis       aspirin 81 MG tablet      Take 1 tablet by mouth daily.        blood glucose monitoring test strip    [employer] CONTOUR    1 Box    1 strip by In Vitro route daily Use to test blood sugars  daily or as directed.    Type 2 diabetes mellitus without complication, without long-term current use of insulin (H)       chlorthalidone 25 MG tablet    HYGROTON    90 tablet    Take 1 tablet (25 mg) by mouth daily    HTN, goal below 130/80       diltiazem HCl COATED BEADS 240 MG Tb24    CARDIZEM LA    90 tablet    Take 240 mg by mouth daily    HTN, goal below 130/80       GLUCOSAMINE CHONDROITIN COMPLX PO           lisinopril 40 MG tablet    PRINIVIL/ZESTRIL    90 tablet    Take 1 tablet (40 mg) by mouth daily    Hypertension goal BP (blood pressure) < 130/80       metFORMIN 1000 MG tablet    GLUCOPHAGE    180 tablet    Take 1 tablet (1,000 mg) by mouth 2 times daily (with meals)    Type 2 diabetes mellitus without complication, without long-term current use of insulin (H)       mometasone 0.1 % cream    ELOCON    45 g    Apply sparingly to affected area twice daily as needed.  Do not apply to face.    Dermatitis       pravastatin 80 MG tablet    PRAVACHOL    90 tablet    Take 1 tablet (80 mg) by mouth daily    Hyperlipidemia LDL goal <100

## 2018-04-18 NOTE — PATIENT INSTRUCTIONS
Schedule a follow up with dermatology.    Stop taking calcium.    You can take vitamin D if you like.    Follow up in 6 months.      Preventive Health Recommendations:       Male Ages 65 and over    Yearly exam:             See your health care provider every year in order to  o   Review health changes.   o   Discuss preventive care.    o   Review your medicines if your doctor has prescribed any.    Talk with your health care provider about whether you should have a test to screen for prostate cancer (PSA).    Every 3 years, have a diabetes test (fasting glucose). If you are at risk for diabetes, you should have this test more often.    Every 5 years, have a cholesterol test. Have this test more often if you are at risk for high cholesterol or heart disease.     Every 10 years, have a colonoscopy. Or, have a yearly FIT test (stool test). These exams will check for colon cancer.    Talk to with your health care provider about screening for Abdominal Aortic Aneurysm if you have a family history of AAA or have a history of smoking.  Shots:     Get a flu shot each year.     Get a tetanus shot every 10 years.     Talk to your doctor about your pneumonia vaccines. There are now two you should receive - Pneumovax (PPSV 23) and Prevnar (PCV 13).    Talk to your doctor about a shingles vaccine.     Talk to your doctor about the hepatitis B vaccine.  Nutrition:     Eat at least 5 servings of fruits and vegetables each day.     Eat whole-grain bread, whole-wheat pasta and brown rice instead of white grains and rice.     Talk to your doctor about Calcium and Vitamin D.   Lifestyle    Exercise for at least 150 minutes a week (30 minutes a day, 5 days a week). This will help you control your weight and prevent disease.     Limit alcohol to one drink per day.     No smoking.     Wear sunscreen to prevent skin cancer.     See your dentist every six months for an exam and cleaning.     See your eye doctor every 1 to 2 years to  screen for conditions such as glaucoma, macular degeneration and cataracts.    [facility]    If you have any questions regarding to your visit please contact your care team:     Team Pink:   Clinic Hours Telephone Number   Internal Medicine:  Dr. Gaby Denise, NP       7am-7pm  Monday - Thursday   7am-5pm  Fridays  [phone]  (Appointment scheduling available 24/7)    Questions about your visit?  Team Line  [phone]   Urgent Care - [place] and [place] - 11am-9pm Monday-Friday Saturday-Sunday- 9am-5pm   [place] - 5pm-9pm Monday-Friday Saturday-Sunday- 9am-5pm  [phone] - [place]   [phone] - [place]       What options do I have for visits at the clinic other than the traditional office visit?  To expand how we care for you, many of our providers are utilizing electronic visits (e-visits) and telephone visits, when medically appropriate, for interactions with their patients rather than a visit in the clinic.   We also offer nurse visits for many medical concerns. Just like any other service, we will bill your insurance company for this type of visit based on time spent on the phone with your provider. Not all insurance companies cover these visits. Please check with your medical insurance if this type of visit is covered. You will be responsible for any charges that are not paid by your insurance.      E-visits via [employer]:  generally incur a $35.00 fee.  Telephone visits:  Time spent on the phone: *charged based on time that is spent on the phone in increments of 10 minutes. Estimated cost:   5-10 mins $30.00   11-20 mins. $59.00   21-30 mins. $85.00   Use [employer] (secure email communication and access to your chart) to send your primary care provider a message or make an appointment. Ask someone on your Team how to sign up for [employer].    For a Price Quote for your services, please call our Consumer Price Line at  [phone].    As always, Thank you for trusting us with your health care needs!    Anita Polanco CMA

## 2018-04-19 PROBLEM — D22.9 MULTIPLE NEVI: Status: ACTIVE | Noted: 2018-04-19

## 2018-04-19 NOTE — PROGRESS NOTES
Normal electrolytes. Normal kidney function. Slightly elevated calcium, as we discussed in the office, is from your blood pressure medication and calcium supplementation.  Stopping the supplements should be all that is needed. Normal liver blood test. Normal thyroid. Normal muscle test. Normal uric acid. Good cholesterol. Good 3 month sugar average.   Stable level of protein in the urine.

## 2019-01-08 ENCOUNTER — TRANSFERRED RECORDS (OUTPATIENT)
Dept: HEALTH INFORMATION MANAGEMENT | Facility: CLINIC | Age: 79
End: 2019-01-08

## 2019-01-11 ENCOUNTER — TRANSFERRED RECORDS (OUTPATIENT)
Dept: HEALTH INFORMATION MANAGEMENT | Facility: CLINIC | Age: 79
End: 2019-01-11

## 2019-01-25 ENCOUNTER — TRANSFERRED RECORDS (OUTPATIENT)
Dept: HEALTH INFORMATION MANAGEMENT | Facility: CLINIC | Age: 79
End: 2019-01-25

## 2019-03-08 ENCOUNTER — MYC REFILL (OUTPATIENT)
Dept: INTERNAL MEDICINE | Facility: CLINIC | Age: 79
End: 2019-03-08

## 2019-03-08 DIAGNOSIS — E11.9 TYPE 2 DIABETES MELLITUS WITHOUT COMPLICATION, WITHOUT LONG-TERM CURRENT USE OF INSULIN (H): ICD-10-CM

## 2019-03-21 ENCOUNTER — OFFICE VISIT (OUTPATIENT)
Dept: FAMILY MEDICINE | Facility: CLINIC | Age: 79
End: 2019-03-21
Payer: COMMERCIAL

## 2019-03-21 VITALS
SYSTOLIC BLOOD PRESSURE: 136 MMHG | BODY MASS INDEX: 35.76 KG/M2 | DIASTOLIC BLOOD PRESSURE: 80 MMHG | TEMPERATURE: 97 F | WEIGHT: 230 LBS | RESPIRATION RATE: 18 BRPM | HEART RATE: 78 BPM | OXYGEN SATURATION: 97 %

## 2019-03-21 DIAGNOSIS — J02.0 STREPTOCOCCAL SORE THROAT: Primary | ICD-10-CM

## 2019-03-21 LAB
DEPRECATED S PYO AG THROAT QL EIA: NORMAL
SPECIMEN SOURCE: NORMAL

## 2019-03-21 PROCEDURE — 87880 STREP A ASSAY W/OPTIC: CPT | Performed by: INTERNAL MEDICINE

## 2019-03-21 PROCEDURE — 99213 OFFICE O/P EST LOW 20 MIN: CPT | Performed by: INTERNAL MEDICINE

## 2019-03-21 PROCEDURE — 87081 CULTURE SCREEN ONLY: CPT | Performed by: INTERNAL MEDICINE

## 2019-03-21 NOTE — PROGRESS NOTES
SUBJECTIVE:   Zoey Romo is a 79 year old male who presents to clinic today for the following health issues:    Pharyngitis  Patient reports symptoms of sore throat, phlegm production which have been present for 1 day. He did begin to feel a little under the weather a few days ago. Denies associated symptoms of coughing, fevers, chills, ear pain. He mentions that his wife had symptoms of a cough and phlegm production that lasted about 6 weeks.     Problem list and histories reviewed & adjusted, as indicated.  Additional history: as documented    Patient Active Problem List   Diagnosis     GERD (gastroesophageal reflux disease)     HYPERLIPIDEMIA LDL GOAL <100     AAA (abdominal aortic aneurysm) (H)     Hypertension goal BP (blood pressure) < 130/80     Dermatochalasis OU     NORMA (obstructive sleep apnea)     Advanced directives, counseling/discussion     Gout     Cataract OU     Lumbar stenosis     Spondylolisthesis of lumbar region     Fusion of spine     Eczema     CKD (chronic kidney disease) stage 2, GFR 60-89 ml/min     Hypercalcemia     Type 2 diabetes mellitus with diabetic nephropathy (H)     Gastroesophageal reflux disease without esophagitis     Chronic gout without tophus, unspecified cause, unspecified site     Abdominal aortic aneurysm without rupture (H)     HTN, goal below 130/80     Epistaxis     Type 2 diabetes mellitus without complication, without long-term current use of insulin (H)     Multiple nevi     Past Surgical History:   Procedure Laterality Date     BIOPSY       COLONOSCOPY       OPTICAL TRACKING SYSTEM FUSION POSTERIOR SPINE LUMBAR  4/29/2013    Procedure: OPTICAL TRACKING SYSTEM FUSION SPINE POSTERIOR LUMBAR ONE LEVEL;  Lumbar 4-5 Laminectomy, Decompression; Lumbar 4-5 Transforaminal Lumbar Interbody Fusion ;  Surgeon: Saurav Valdez MD;  Location:  OR       Social History     Tobacco Use     Smoking status: Former Smoker     Packs/day: 0.50     Years: 10.00      Pack years: 5.00     Types: Cigars     Last attempt to quit: 1985     Years since quittin.1     Smokeless tobacco: Never Used   Substance Use Topics     Alcohol use: Yes     Alcohol/week: 3.5 oz     Comment: 2 per month     Family History   Problem Relation Age of Onset     Heart Disease Father      Cancer Paternal Grandfather         leukemia      Heart Disease Brother      Diabetes Brother          BP Readings from Last 3 Encounters:   19 160/84   18 120/72   10/04/17 128/78    Wt Readings from Last 3 Encounters:   19 104.3 kg (230 lb)   18 100.2 kg (221 lb)   10/04/17 100.2 kg (221 lb)         Reviewed and updated as needed this visit by clinical staff  Tobacco  Allergies  Med Hx  Surg Hx  Fam Hx  Soc Hx      Reviewed and updated as needed this visit by Provider       ROS:  Constitutional, HEENT, cardiovascular, pulmonary, GI, , musculoskeletal, neuro, skin, endocrine and psych systems are negative, except as otherwise noted.    This document serves as a record of the services and decisions personally performed and made by Jacoby Garcia MD. It was created on his behalf by Paul Sawyer, a trained medical scribe. The creation of this document is based on the provider's statements to the medical scribe.  Paul Sawyer 6:11 PM 2019    OBJECTIVE:     /84   Pulse 78   Temp 97  F (36.1  C) (Oral)   Resp 18   Wt 104.3 kg (230 lb)   SpO2 97%   BMI 35.76 kg/m    Body mass index is 35.76 kg/m .  GENERAL: healthy, alert and no distress  EYES: Eyes grossly normal to inspection, PERRL and conjunctivae and sclerae normal  HENT: post oropharynx with mild erythema, no exudates or whitish patches  NECK: no adenopathy, no asymmetry, masses, or scars and thyroid normal to palpation  RESP: lungs clear to auscultation - no rales, rhonchi or wheezes  SKIN: no suspicious lesions or rashes to visible skin   NEURO: Normal strength and tone, mentation intact and speech  normal  PSYCH: mentation appears normal, affect normal/bright    Diagnostic Test Results:  Results for orders placed or performed in visit on 03/21/19 (from the past 24 hour(s))   Strep, Rapid Screen   Result Value Ref Range    Specimen Description Throat     Rapid Strep A Screen       NEGATIVE: No Group A streptococcal antigen detected by immunoassay, await culture report.       ASSESSMENT/PLAN:     (J02.0) Streptococcal sore throat  (primary encounter diagnosis)  Comment: viral illness. Supportive measures reviewed , we discussed what to be on the watch for  / update me if significant changes have taken place . Consider antibiotics down the road if a week passes with no interval improvement   Plan: Strep, Rapid Screen, Beta strep group A culture              See Patient Instructions    The information in this document, created by the medical scribe for me, accurately reflects the services I personally performed and the decisions made by me. I have reviewed and approved this document for accuracy prior to leaving the patient care area.  March 21, 2019 6:12 PM    Jacoby Garcia MD  [facility]

## 2019-03-21 NOTE — PATIENT INSTRUCTIONS
Supportive measures reviewed ,    Rest   Push fluids   Acetaminophen or Naproxen [Aleve] / ADVIL (ibuprofen)  prn for fever and aches and pains  Guaifenesin [ mucinex ] can help for sinus congestion   Robitussin DM, 1-2 tsp q 4-6 hours can help with congestion / coughing   Sucrets or other throat lozenges can help for sore throat along with gargling with warm salt water   Gargling with warm saltwater can really help the sore throat symptoms , prn / as often as every hour

## 2019-03-22 LAB
BACTERIA SPEC CULT: NORMAL
SPECIMEN SOURCE: NORMAL

## 2019-04-17 ENCOUNTER — DOCUMENTATION ONLY (OUTPATIENT)
Dept: OPHTHALMOLOGY | Facility: CLINIC | Age: 79
End: 2019-04-17

## 2019-04-26 ENCOUNTER — OFFICE VISIT (OUTPATIENT)
Dept: INTERNAL MEDICINE | Facility: CLINIC | Age: 79
End: 2019-04-26
Payer: COMMERCIAL

## 2019-04-26 VITALS
BODY MASS INDEX: 35.19 KG/M2 | OXYGEN SATURATION: 97 % | HEART RATE: 84 BPM | SYSTOLIC BLOOD PRESSURE: 138 MMHG | RESPIRATION RATE: 14 BRPM | WEIGHT: 224.2 LBS | HEIGHT: 67 IN | DIASTOLIC BLOOD PRESSURE: 86 MMHG | TEMPERATURE: 97.8 F

## 2019-04-26 DIAGNOSIS — E83.52 HYPERCALCEMIA: ICD-10-CM

## 2019-04-26 DIAGNOSIS — I10 HTN, GOAL BELOW 130/80: ICD-10-CM

## 2019-04-26 DIAGNOSIS — E78.5 HYPERLIPIDEMIA LDL GOAL <100: ICD-10-CM

## 2019-04-26 DIAGNOSIS — E11.9 TYPE 2 DIABETES MELLITUS WITHOUT COMPLICATION, WITHOUT LONG-TERM CURRENT USE OF INSULIN (H): ICD-10-CM

## 2019-04-26 DIAGNOSIS — I71.40 ABDOMINAL AORTIC ANEURYSM (AAA) WITHOUT RUPTURE (H): ICD-10-CM

## 2019-04-26 DIAGNOSIS — I10 HYPERTENSION GOAL BP (BLOOD PRESSURE) < 130/80: ICD-10-CM

## 2019-04-26 DIAGNOSIS — E66.01 MORBID OBESITY (H): ICD-10-CM

## 2019-04-26 DIAGNOSIS — Z85.828 HISTORY OF BASAL CELL CANCER: ICD-10-CM

## 2019-04-26 DIAGNOSIS — K21.9 GASTROESOPHAGEAL REFLUX DISEASE WITHOUT ESOPHAGITIS: ICD-10-CM

## 2019-04-26 DIAGNOSIS — M1A.9XX0 CHRONIC GOUT WITHOUT TOPHUS, UNSPECIFIED CAUSE, UNSPECIFIED SITE: ICD-10-CM

## 2019-04-26 DIAGNOSIS — N18.2 CKD (CHRONIC KIDNEY DISEASE) STAGE 2, GFR 60-89 ML/MIN: ICD-10-CM

## 2019-04-26 DIAGNOSIS — Z00.00 ROUTINE GENERAL MEDICAL EXAMINATION AT A HEALTH CARE FACILITY: Primary | ICD-10-CM

## 2019-04-26 DIAGNOSIS — G47.33 OSA (OBSTRUCTIVE SLEEP APNEA): ICD-10-CM

## 2019-04-26 LAB
ALBUMIN SERPL-MCNC: 3.8 G/DL (ref 3.4–5)
ALP SERPL-CCNC: 31 U/L (ref 40–150)
ALT SERPL W P-5'-P-CCNC: 25 U/L (ref 0–70)
ANION GAP SERPL CALCULATED.3IONS-SCNC: 10 MMOL/L (ref 3–14)
AST SERPL W P-5'-P-CCNC: 15 U/L (ref 0–45)
BILIRUB SERPL-MCNC: 0.4 MG/DL (ref 0.2–1.3)
BUN SERPL-MCNC: 18 MG/DL (ref 7–30)
CALCIUM SERPL-MCNC: 10.3 MG/DL (ref 8.5–10.1)
CHLORIDE SERPL-SCNC: 100 MMOL/L (ref 94–109)
CHOLEST SERPL-MCNC: 140 MG/DL
CO2 SERPL-SCNC: 26 MMOL/L (ref 20–32)
CREAT SERPL-MCNC: 0.95 MG/DL (ref 0.66–1.25)
CREAT UR-MCNC: 90 MG/DL
DEPRECATED CALCIDIOL+CALCIFEROL SERPL-MC: 28 UG/L (ref 20–75)
ERYTHROCYTE [DISTWIDTH] IN BLOOD BY AUTOMATED COUNT: 12.7 % (ref 10–15)
GFR SERPL CREATININE-BSD FRML MDRD: 75 ML/MIN/{1.73_M2}
GLUCOSE SERPL-MCNC: 120 MG/DL (ref 70–99)
HBA1C MFR BLD: 6.3 % (ref 0–5.6)
HCT VFR BLD AUTO: 44.8 % (ref 40–53)
HDLC SERPL-MCNC: 52 MG/DL
HGB BLD-MCNC: 14.9 G/DL (ref 13.3–17.7)
LDLC SERPL CALC-MCNC: 63 MG/DL
MCH RBC QN AUTO: 31.8 PG (ref 26.5–33)
MCHC RBC AUTO-ENTMCNC: 33.3 G/DL (ref 31.5–36.5)
MCV RBC AUTO: 96 FL (ref 78–100)
MICROALBUMIN UR-MCNC: 322 MG/L
MICROALBUMIN/CREAT UR: 356.98 MG/G CR (ref 0–17)
NONHDLC SERPL-MCNC: 88 MG/DL
PLATELET # BLD AUTO: 220 10E9/L (ref 150–450)
POTASSIUM SERPL-SCNC: 3.4 MMOL/L (ref 3.4–5.3)
PROT SERPL-MCNC: 7.7 G/DL (ref 6.8–8.8)
RBC # BLD AUTO: 4.69 10E12/L (ref 4.4–5.9)
SODIUM SERPL-SCNC: 136 MMOL/L (ref 133–144)
TRIGL SERPL-MCNC: 124 MG/DL
URATE SERPL-MCNC: 4.1 MG/DL (ref 3.5–7.2)
WBC # BLD AUTO: 9.6 10E9/L (ref 4–11)

## 2019-04-26 PROCEDURE — 85027 COMPLETE CBC AUTOMATED: CPT | Performed by: INTERNAL MEDICINE

## 2019-04-26 PROCEDURE — 36415 COLL VENOUS BLD VENIPUNCTURE: CPT | Performed by: INTERNAL MEDICINE

## 2019-04-26 PROCEDURE — 82306 VITAMIN D 25 HYDROXY: CPT | Performed by: INTERNAL MEDICINE

## 2019-04-26 PROCEDURE — 80053 COMPREHEN METABOLIC PANEL: CPT | Performed by: INTERNAL MEDICINE

## 2019-04-26 PROCEDURE — 80061 LIPID PANEL: CPT | Performed by: INTERNAL MEDICINE

## 2019-04-26 PROCEDURE — 83036 HEMOGLOBIN GLYCOSYLATED A1C: CPT | Performed by: INTERNAL MEDICINE

## 2019-04-26 PROCEDURE — 84550 ASSAY OF BLOOD/URIC ACID: CPT | Performed by: INTERNAL MEDICINE

## 2019-04-26 PROCEDURE — G0439 PPPS, SUBSEQ VISIT: HCPCS | Performed by: INTERNAL MEDICINE

## 2019-04-26 PROCEDURE — 82043 UR ALBUMIN QUANTITATIVE: CPT | Performed by: INTERNAL MEDICINE

## 2019-04-26 PROCEDURE — 99207 C FOOT EXAM  NO CHARGE: CPT | Mod: 25 | Performed by: INTERNAL MEDICINE

## 2019-04-26 RX ORDER — CHLORTHALIDONE 25 MG/1
25 TABLET ORAL DAILY
Qty: 90 TABLET | Refills: 4 | Status: SHIPPED | OUTPATIENT
Start: 2019-04-26 | End: 2020-04-30

## 2019-04-26 RX ORDER — PRAVASTATIN SODIUM 80 MG/1
80 TABLET ORAL DAILY
Qty: 90 TABLET | Refills: 3 | Status: SHIPPED | OUTPATIENT
Start: 2019-04-26 | End: 2020-04-30

## 2019-04-26 RX ORDER — ALLOPURINOL 100 MG/1
TABLET ORAL
Qty: 180 TABLET | Refills: 3 | Status: SHIPPED | OUTPATIENT
Start: 2019-04-26 | End: 2020-04-30

## 2019-04-26 RX ORDER — LISINOPRIL 40 MG/1
40 TABLET ORAL DAILY
Qty: 90 TABLET | Refills: 3 | Status: SHIPPED | OUTPATIENT
Start: 2019-04-26 | End: 2020-04-30

## 2019-04-26 ASSESSMENT — ENCOUNTER SYMPTOMS
NERVOUS/ANXIOUS: 0
NAUSEA: 0
PALPITATIONS: 0
DIZZINESS: 0
COUGH: 0
HEARTBURN: 0
SORE THROAT: 0
MYALGIAS: 0
CONSTIPATION: 0
DIARRHEA: 0
WEAKNESS: 0
PARESTHESIAS: 0
HEADACHES: 0
HEMATURIA: 0
SHORTNESS OF BREATH: 0
HEMATOCHEZIA: 0
FREQUENCY: 0
ARTHRALGIAS: 0
EYE PAIN: 0
CHILLS: 0
ABDOMINAL PAIN: 0
DYSURIA: 0
FEVER: 0
JOINT SWELLING: 0

## 2019-04-26 ASSESSMENT — PAIN SCALES - GENERAL: PAINLEVEL: NO PAIN (0)

## 2019-04-26 ASSESSMENT — ACTIVITIES OF DAILY LIVING (ADL): CURRENT_FUNCTION: NO ASSISTANCE NEEDED

## 2019-04-26 ASSESSMENT — MIFFLIN-ST. JEOR: SCORE: 1685.71

## 2019-04-26 NOTE — PATIENT INSTRUCTIONS
Schedule your eye exam  Consider screening for carotid stenosis with [facility] as this isn't covered by Medicare.

## 2019-04-26 NOTE — PROGRESS NOTES
"SUBJECTIVE:   Zoey Romo is a 79 year old male who presents for Preventive Visit.  Are you in the first 12 months of your Medicare coverage?  No    Healthy Habits:     In general, how would you rate your overall health?  Very good    Frequency of exercise:  2-3 days/week    Duration of exercise:  15-30 minutes    Do you usually eat at least 4 servings of fruit and vegetables a day, include whole grains    & fiber and avoid regularly eating high fat or \"junk\" foods?  No    Taking medications regularly:  Yes    Barriers to taking medications:  None    Medication side effects:  None    Ability to successfully perform activities of daily living:  No assistance needed    Home Safety:  Lack of grab bars in the bathroom    Hearing Impairment:  No hearing concerns    In the past 6 months, have you been bothered by leaking of urine?  No    In general, how would you rate your overall mental or emotional health?  Very good      PHQ-2 Total Score: 0    Additional concerns today:  No    Do you feel safe in your environment? Yes    Do you have a Health Care Directive? Yes: Advance Directive has been received and scanned.      Fall risk  Fallen 2 or more times in the past year?: No  Any fall with injury in the past year?: No    Cognitive Screening   1) Repeat 3 items (Leader, Season, Table)    2) Clock draw: NORMAL  3) 3 item recall: Recalls 1 object   Results: NORMAL clock, 1-2 items recalled: COGNITIVE IMPAIRMENT LESS LIKELY    Mini-CogTM Copyright S Farrukh. Licensed by the author for use in [facility]; reprinted with permission (ashu@.[facility]). All rights reserved.      Blood sugar in the 120 range.        Reviewed and updated as needed this visit by clinical staff  Tobacco  Allergies  Meds  Problems  Med Hx  Surg Hx  Fam Hx         Reviewed and updated as needed this visit by Provider  Tobacco  Allergies  Meds  Problems  Med Hx  Surg Hx  Fam Hx        Social History     Tobacco Use     Smoking " status: Former Smoker     Packs/day: 0.50     Years: 10.00     Pack years: 5.00     Types: Cigars     Last attempt to quit: 1985     Years since quittin.2     Smokeless tobacco: Never Used   Substance Use Topics     Alcohol use: Yes     Alcohol/week: 3.5 oz     Comment: 2 per month         Alcohol Use 3/21/2017   Prescreen: >3 drinks/day or >7 drinks/week? The patient does not drink >3 drinks per day nor >7 drinks per week.               Current providers sharing in care for this patient include:   Patient Care Team:  Gaby Baker MD as PCP - General  Gaby Baker MD as Assigned PCP    The following health maintenance items are reviewed in Epic and correct as of today:  Health Maintenance   Topic Date Due     EYE EXAM Q1 YEAR  2018     FALL RISK ASSESSMENT  2019     A1C Q6 MO  10/26/2019     TSH W/ FREE T4 REFLEX Q2 YEAR  2020     FOOT EXAM Q1 YEAR  2020     BMP Q1 YR  2020     MEDICARE ANNUAL WELLNESS VISIT  2020     LIPID MONITORING Q1 YEAR  2020     MICROALBUMIN Q1 YEAR  2020     COLONOSCOPY Q5 YR  2021     ADVANCE DIRECTIVE PLANNING Q5 YRS  2022     DTAP/TDAP/TD IMMUNIZATION (2 - Td) 2023     PHQ-2  Completed     INFLUENZA VACCINE  Addressed     IPV IMMUNIZATION  Aged Out     MENINGITIS IMMUNIZATION  Aged Out     Labs reviewed in EPIC  BP Readings from Last 3 Encounters:   19 138/86   19 136/80   18 120/72    Wt Readings from Last 3 Encounters:   19 101.7 kg (224 lb 3.2 oz)   19 104.3 kg (230 lb)   18 100.2 kg (221 lb)                  Patient Active Problem List   Diagnosis     GERD (gastroesophageal reflux disease)     HYPERLIPIDEMIA LDL GOAL <100     AAA (abdominal aortic aneurysm) (H)     Hypertension goal BP (blood pressure) < 130/80     Dermatochalasis OU     NORMA (obstructive sleep apnea)     Advanced directives, counseling/discussion     Gout     Cataract OU     Lumbar stenosis      Spondylolisthesis of lumbar region     Fusion of spine     Eczema     CKD (chronic kidney disease) stage 2, GFR 60-89 ml/min     Hypercalcemia     Type 2 diabetes mellitus with diabetic nephropathy (H)     Gastroesophageal reflux disease without esophagitis     Chronic gout without tophus, unspecified cause, unspecified site     Abdominal aortic aneurysm without rupture (H)     HTN, goal below 130/80     Epistaxis     Type 2 diabetes mellitus without complication, without long-term current use of insulin (H)     Multiple nevi     Obesity (BMI 35.0-39.9) with comorbidity (H)     History of basal cell cancer     Past Surgical History:   Procedure Laterality Date     BIOPSY       COLONOSCOPY       OPTICAL TRACKING SYSTEM FUSION POSTERIOR SPINE LUMBAR  2013    Procedure: OPTICAL TRACKING SYSTEM FUSION SPINE POSTERIOR LUMBAR ONE LEVEL;  Lumbar 4-5 Laminectomy, Decompression; Lumbar 4-5 Transforaminal Lumbar Interbody Fusion ;  Surgeon: Saurav Valdez MD;  Location:  OR       Social History     Tobacco Use     Smoking status: Former Smoker     Packs/day: 0.50     Years: 10.00     Pack years: 5.00     Types: Cigars     Last attempt to quit: 1985     Years since quittin.2     Smokeless tobacco: Never Used   Substance Use Topics     Alcohol use: Yes     Alcohol/week: 3.5 oz     Comment: 2 per month     Family History   Problem Relation Age of Onset     Heart Disease Father      Cancer Paternal Grandfather         leukemia      Heart Disease Brother      Diabetes Brother          Current Outpatient Medications   Medication Sig Dispense Refill     allopurinol (ZYLOPRIM) 100 MG tablet TAKE 1 TABLET (100 MG) BY MOUTH 2 TIMES DAILY 180 tablet 3     ammonium lactate (LAC-HYDRIN) 12 % cream Apply topically 2 times daily as needed for dry skin 385 g 3     aspirin 81 MG tablet Take 1 tablet by mouth daily.       blood glucose monitoring (GOOD CONTOUR) test strip 1 strip by In Vitro route daily Use to  test blood sugars  daily or as directed. 1 Box 11     chlorthalidone (HYGROTON) 25 MG tablet Take 1 tablet (25 mg) by mouth daily 90 tablet 4     diltiazem ER COATED BEADS (CARDIZEM LA) 240 MG 24 hr tablet Take 1 tablet (240 mg) by mouth daily 90 tablet 4     GLUCOSAMINE CHONDROITIN COMPLX PO        lisinopril (PRINIVIL/ZESTRIL) 40 MG tablet Take 1 tablet (40 mg) by mouth daily 90 tablet 3     metFORMIN (GLUCOPHAGE) 1000 MG tablet Take 1 tablet (1,000 mg) by mouth 2 times daily (with meals) Need to see MD for further refills 180 tablet 11     mometasone (ELOCON) 0.1 % cream Apply sparingly to affected area twice daily as needed.  Do not apply to face. 45 g 3     pravastatin (PRAVACHOL) 80 MG tablet Take 1 tablet (80 mg) by mouth daily 90 tablet 3     Allergies   Allergen Reactions     Pollen [Pollen Extract]      Recent Labs   Lab Test 04/26/19  0903 04/18/18  1049 09/27/17  0815 03/21/17  1051  01/15/16  0906   A1C 6.3* 6.4* 6.2* 6.4*   < >  --    LDL  --  67  --  74  --  61   HDL  --  55  --  57  --  45   TRIG  --  140  --  136  --  173*   ALT  --  25 23 23  --  42   CR  --  0.98 0.95 1.04  --  1.07   GFRESTIMATED  --  73 76 69  --  67   GFRESTBLACK  --  89 >90 84  --  81   POTASSIUM  --  3.9 3.6 3.8  --  3.8   TSH  --  2.21  --   --   --  1.94    < > = values in this interval not displayed.          Review of Systems   Constitutional: Negative for chills and fever.   HENT: Negative for congestion, ear pain, hearing loss and sore throat.    Eyes: Negative for pain and visual disturbance.   Respiratory: Negative for cough and shortness of breath.    Cardiovascular: Negative for chest pain, palpitations and peripheral edema.   Gastrointestinal: Negative for abdominal pain, constipation, diarrhea, heartburn, hematochezia and nausea.   Genitourinary: Negative for discharge, dysuria, frequency, genital sores, hematuria, impotence and urgency.   Musculoskeletal: Negative for arthralgias, joint swelling and myalgias.  "  Skin: Negative for rash.   Neurological: Negative for dizziness, weakness, headaches and paresthesias.   Psychiatric/Behavioral: Negative for mood changes. The patient is not nervous/anxious.          OBJECTIVE:   /86   Pulse 84   Temp 97.8  F (36.6  C) (Oral)   Resp 14   Ht 1.702 m (5' 7.01\")   Wt 101.7 kg (224 lb 3.2 oz)   SpO2 97%   BMI 35.11 kg/m   Estimated body mass index is 35.11 kg/m  as calculated from the following:    Height as of this encounter: 1.702 m (5' 7.01\").    Weight as of this encounter: 101.7 kg (224 lb 3.2 oz).  Physical Exam   Constitutional: He is oriented to person, place, and time. He appears well-developed and well-nourished. No distress.   HENT:   Right Ear: Tympanic membrane and external ear normal.   Left Ear: Tympanic membrane and external ear normal.   Nose: Nose normal.   Mouth/Throat: Oropharynx is clear and moist. No oral lesions. No oropharyngeal exudate.   Eyes: Pupils are equal, round, and reactive to light. Conjunctivae are normal. Right eye exhibits no discharge. Left eye exhibits no discharge.   Neck: Neck supple. No tracheal deviation present. No thyromegaly present.   Cardiovascular: Normal rate, regular rhythm, S1 normal, S2 normal, normal heart sounds and normal pulses. Exam reveals no S3 and no S4.   No murmur heard.  Pulmonary/Chest: Effort normal and breath sounds normal. No respiratory distress. He has no wheezes. He has no rales.   Abdominal: Soft. Bowel sounds are normal. He exhibits no mass. There is no hepatosplenomegaly. There is no tenderness.   Musculoskeletal: Normal range of motion. He exhibits no edema or deformity.   Lymphadenopathy:     He has no cervical adenopathy.   Neurological: He is alert and oriented to person, place, and time. He has normal strength and normal reflexes. He exhibits normal muscle tone.   Skin: Skin is warm and dry. No lesion and no rash noted.   Psychiatric: He has a normal mood and affect. His speech is normal. " Judgment and thought content normal. Cognition and memory are normal.         Diagnostic Test Results:  Results for orders placed or performed in visit on 04/26/19 (from the past 24 hour(s))   CBC with platelets   Result Value Ref Range    WBC 9.6 4.0 - 11.0 10e9/L    RBC Count 4.69 4.4 - 5.9 10e12/L    Hemoglobin 14.9 13.3 - 17.7 g/dL    Hematocrit 44.8 40.0 - 53.0 %    MCV 96 78 - 100 fl    MCH 31.8 26.5 - 33.0 pg    MCHC 33.3 31.5 - 36.5 g/dL    RDW 12.7 10.0 - 15.0 %    Platelet Count 220 150 - 450 10e9/L   Hemoglobin A1c   Result Value Ref Range    Hemoglobin A1C 6.3 (H) 0 - 5.6 %       ASSESSMENT / PLAN:   1. Routine general medical examination at a health care facility      2. Type 2 diabetes mellitus without complication, without long-term current use of insulin (H)  Well controlled with medications without side effects.   - Lipid panel reflex to direct LDL Fasting  - Hemoglobin A1c  - Albumin Random Urine Quantitative with Creat Ratio  - metFORMIN (GLUCOPHAGE) 1000 MG tablet; Take 1 tablet (1,000 mg) by mouth 2 times daily (with meals) Need to see MD for further refills  Dispense: 180 tablet; Refill: 11  - FOOT EXAM    3. Morbid obesity (H)  Diet and exercise     4. Abdominal aortic aneurysm (AAA) without rupture (H)    - US Abdominal Aorta Imaging; Future    5. HTN, goal below 130/80  Well controlled with medications without side effects.   - chlorthalidone (HYGROTON) 25 MG tablet; Take 1 tablet (25 mg) by mouth daily  Dispense: 90 tablet; Refill: 4  - diltiazem ER COATED BEADS (CARDIZEM LA) 240 MG 24 hr tablet; Take 1 tablet (240 mg) by mouth daily  Dispense: 90 tablet; Refill: 4    6. Chronic gout without tophus, unspecified cause, unspecified site  Well controlled with medications without side effects.   - CBC with platelets  - Uric acid  - allopurinol (ZYLOPRIM) 100 MG tablet; TAKE 1 TABLET (100 MG) BY MOUTH 2 TIMES DAILY  Dispense: 180 tablet; Refill: 3    7. Hypertension goal BP (blood pressure) <  "130/80  Well controlled with medications without side effects.   - CBC with platelets  - lisinopril (PRINIVIL/ZESTRIL) 40 MG tablet; Take 1 tablet (40 mg) by mouth daily  Dispense: 90 tablet; Refill: 3    8. Hyperlipidemia LDL goal <100  Well controlled with medications without side effects.   - pravastatin (PRAVACHOL) 80 MG tablet; Take 1 tablet (80 mg) by mouth daily  Dispense: 90 tablet; Refill: 3    9. CKD (chronic kidney disease) stage 2, GFR 60-89 ml/min  Well controlled with medications without side effects.   - Comprehensive metabolic panel  - CBC with platelets    10. Hypercalcemia  Is not on calcium supplements.  Is on hydrochlorothiazide elton tis likely contributing.   - Comprehensive metabolic panel  - CBC with platelets  - Vitamin D Deficiency    11. Gastroesophageal reflux disease without esophagitis  Well controlled with medications without side effects.     12. History of basal cell cancer  Follows with derm     13. NORMA (obstructive sleep apnea)  Not wearing cpap.  Per patient instructions.   - SLEEP EVALUATION & MANAGEMENT REFERRAL - ADULT -[facility] - [place]  [phone] (Age 15 and up); Future    End of Life Planning:  Patient currently has an advanced directive: Yes.  Practitioner is supportive of decision.    COUNSELING:  Reviewed preventive health counseling, as reflected in patient instructions       Regular exercise       Healthy diet/nutrition    BP Readings from Last 1 Encounters:   04/26/19 138/86     Estimated body mass index is 35.11 kg/m  as calculated from the following:    Height as of this encounter: 1.702 m (5' 7.01\").    Weight as of this encounter: 101.7 kg (224 lb 3.2 oz).      Weight management plan: Discussed healthy diet and exercise guidelines     reports that he quit smoking about 34 years ago. His smoking use included cigars. He has a 5.00 pack-year smoking history. He has never used smokeless tobacco.      Appropriate preventive services were discussed " with this patient, including applicable screening as appropriate for cardiovascular disease, diabetes, osteopenia/osteoporosis, and glaucoma.  As appropriate for age/gender, discussed screening for colorectal cancer, prostate cancer, breast cancer, and cervical cancer. Checklist reviewing preventive services available has been given to the patient.    Reviewed patients plan of care and provided an AVS. The Basic Care Plan (routine screening as documented in Health Maintenance) for Zoey meets the Care Plan requirement. This Care Plan has been established and reviewed with the Patient.    Counseling Resources:  ATP IV Guidelines  Pooled Cohorts Equation Calculator  Breast Cancer Risk Calculator  FRAX Risk Assessment  ICSI Preventive Guidelines  Dietary Guidelines for Americans, 2010  USDA's MyPlate  ASA Prophylaxis  Lung CA Screening    Gaby Baker MD  [facility]    Identified Health Risk.  Patient Instructions   Schedule your eye exam  Consider screening for carotid stenosis with [facility] as this isn't covered by Medicare.

## 2019-04-29 NOTE — RESULT ENCOUNTER NOTE
Normal electrolytes. Normal kidney function. Stable calcium level. Normal liver blood test. Good cholesterol. Normal uric acid level.  Normal Vitamin D. Protein in the urine is elevated more than last time.  Let's recheck this in 6 months.  Sometimes it is just a temporary jump.

## 2019-05-23 ENCOUNTER — ANCILLARY PROCEDURE (OUTPATIENT)
Dept: ULTRASOUND IMAGING | Facility: CLINIC | Age: 79
End: 2019-05-23
Attending: INTERNAL MEDICINE
Payer: COMMERCIAL

## 2019-05-23 DIAGNOSIS — I71.40 ABDOMINAL AORTIC ANEURYSM (AAA) WITHOUT RUPTURE (H): ICD-10-CM

## 2019-05-23 PROCEDURE — 76775 US EXAM ABDO BACK WALL LIM: CPT

## 2019-07-10 ENCOUNTER — OFFICE VISIT (OUTPATIENT)
Dept: FAMILY MEDICINE | Facility: CLINIC | Age: 79
End: 2019-07-10
Payer: COMMERCIAL

## 2019-07-10 ENCOUNTER — NURSE TRIAGE (OUTPATIENT)
Dept: FAMILY MEDICINE | Facility: CLINIC | Age: 79
End: 2019-07-10

## 2019-07-10 ENCOUNTER — ANCILLARY PROCEDURE (OUTPATIENT)
Dept: GENERAL RADIOLOGY | Facility: CLINIC | Age: 79
End: 2019-07-10
Attending: PHYSICIAN ASSISTANT
Payer: COMMERCIAL

## 2019-07-10 VITALS
WEIGHT: 226 LBS | SYSTOLIC BLOOD PRESSURE: 126 MMHG | BODY MASS INDEX: 35.39 KG/M2 | TEMPERATURE: 97.1 F | RESPIRATION RATE: 16 BRPM | DIASTOLIC BLOOD PRESSURE: 70 MMHG | HEART RATE: 97 BPM

## 2019-07-10 DIAGNOSIS — M25.551 HIP PAIN, RIGHT: ICD-10-CM

## 2019-07-10 DIAGNOSIS — M25.551 HIP PAIN, RIGHT: Primary | ICD-10-CM

## 2019-07-10 PROCEDURE — 72100 X-RAY EXAM L-S SPINE 2/3 VWS: CPT

## 2019-07-10 PROCEDURE — 73502 X-RAY EXAM HIP UNI 2-3 VIEWS: CPT

## 2019-07-10 PROCEDURE — 99214 OFFICE O/P EST MOD 30 MIN: CPT | Performed by: PHYSICIAN ASSISTANT

## 2019-07-10 RX ORDER — SENNOSIDES 8.6 MG
650 CAPSULE ORAL EVERY 8 HOURS PRN
Qty: 60 TABLET | Refills: 1 | Status: SHIPPED | OUTPATIENT
Start: 2019-07-10 | End: 2023-05-16

## 2019-07-10 RX ORDER — PREDNISONE 20 MG/1
20 TABLET ORAL DAILY
Qty: 5 TABLET | Refills: 0 | Status: SHIPPED | OUTPATIENT
Start: 2019-07-10 | End: 2019-07-15

## 2019-07-10 NOTE — TELEPHONE ENCOUNTER
"  Additional Information    Negative: Followed a hip injury    Negative: Followed a knee injury    Negative: Followed an ankle or foot injury    Negative: Back pain radiating (shooting) into leg(s)    Negative: Foot pain is the main symptom    Negative: Ankle pain is the main symptom    Negative: Knee pain is the main symptom    Negative: Leg swelling is the main symptom    Negative: Looks like a broken bone or dislocated joint (e.g., crooked or deformed)    Negative: Sounds like a life-threatening emergency to the triager    Negative: Chest pain    Negative: Difficulty breathing    Negative: Entire foot is cool or blue in comparison to other side    Negative: Unable to walk    Thigh or calf pain in only one leg and present > 1 hour    Negative: Fever and red area (or area very tender to touch)    Negative: Fever and swollen joint    Negative: Thigh, calf, or ankle swelling in only one leg    Negative: Thigh, calf, or ankle swelling in both legs, but one side is definitely more swollen    Negative: Hip or leg fracture in past 2 months (e.g., or had cast on leg or ankle)    Negative: Patient sounds very sick or weak to the triager    SEVERE pain (e.g., excruciating, unable to do any normal activities)     Only with ambulation    Negative: Cast on leg or ankle and now has increasing pain    Negative: Red area or streak and large (> 2 in. or 5 cm)    Negative: Painful rash with multiple small blisters grouped together (i.e., dermatomal distribution or 'band' or 'stripe')    Negative: Looks like a boil, infected sore, deep ulcer, or other infected rash (spreading redness, pus)    Negative: Localized rash is very painful (no fever)    Patient wants to be seen    Negative: Numbness in a leg or foot (i.e., loss of sensation)    Negative: Localized pain, redness or hard lump along vein    Answer Assessment - Initial Assessment Questions  1. ONSET: \"When did the pain start?\"       About 2 weeks   2. LOCATION: \"Where is the " "pain located?\"       Right side, groin and hip bone, sometime down to the knee   3. PAIN: \"How bad is the pain?\"    (Scale 1-10; or mild, moderate, severe)    -  MILD (1-3): doesn't interfere with normal activities     -  MODERATE (4-7): interferes with normal activities (e.g., work or school) or awakens from sleep, limping     -  SEVERE (8-10): excruciating pain, unable to do any normal activities, unable to walk      2/10 when resting, 10/10 when walking   4. WORK OR EXERCISE: \"Has there been any recent work or exercise that involved this part of the body?\"       No and no recent injuries   5. CAUSE: \"What do you think is causing the leg pain?\"      Unsure   6. OTHER SYMPTOMS: \"Do you have any other symptoms?\" (e.g., chest pain, back pain, breathing difficulty, swelling, rash, fever, numbness, weakness)      Denies any other symptoms  7. PREGNANCY: \"Is there any chance you are pregnant?\" \"When was your last menstrual period?\"      N/a    Protocols used: LEG PAIN-A-OH    "

## 2019-07-10 NOTE — PROGRESS NOTES
Subjective     Zoey Romo is a 79 year old male who presents to clinic today for the following health issues:    HPI   Joint Pain    Onset: 2 week    Description:   Location: right leg and hip  Character: Sharp    Intensity: moderate    Progression of Symptoms: worse, same    Accompanying Signs & Symptoms:  Other symptoms: radiation of pain to leg    History:   Previous similar pain: no       Precipitating factors:   Trauma or overuse: YES    Alleviating factors:  Improved by: massage, tylenol    Therapies Tried and outcome: tylenol      Patient was using a riding mower and splitting wood when he first noticed the pain.  PSHx of Fusion L 4-5.       Review of Systems   ROS COMP: Constitutional, HEENT, cardiovascular, pulmonary, gi and gu systems are negative, except as otherwise noted.      Objective    /70   Pulse 97   Temp 97.1  F (36.2  C)   Resp 16   Wt 102.5 kg (226 lb)   BMI 35.39 kg/m    Body mass index is 35.39 kg/m .  Physical Exam   GENERAL: healthy, alert and no distress   (male): normal male genitalia without lesions or urethral discharge, no hernia  MS: normal muscle tone, normal range of motion and pain with hip flexion active, not passive.  Decreased internal rotation of hip.  Comprehensive back pain exam:  No tenderness, Pt unable to extend.  , Lower extremity strength functional and equal on both sides and Straight leg raise negative bilaterally    Diagnostic Test Results:  Xray - Over-read pending        Assessment & Plan     1. Hip pain, right  - XR Pelvis and Hip Right 1 View; Future  - XR Lumbar Spine 2/3 Views; Future  - acetaminophen (TYLENOL) 650 MG CR tablet; Take 1 tablet (650 mg) by mouth every 8 hours as needed for pain  Dispense: 60 tablet; Refill: 1  - predniSONE (DELTASONE) 20 MG tablet; Take 1 tablet (20 mg) by mouth daily for 5 days  Dispense: 5 tablet; Refill: 0  - PHYSICAL THERAPY REFERRAL; Future     Use medication as directed.  Follow up on XR  Follow up per  Physical Therapy  Follow up if symptoms should persist, change or worsen.  Patient amenable to this follow up plan.             No follow-ups on file.    Tiffani Levi PA-C  [facility]

## 2019-07-10 NOTE — TELEPHONE ENCOUNTER
See mychart encounter from today  Dr. Baker is unable to add patient on   She recommended another      Karen Godwin RN

## 2019-07-10 NOTE — TELEPHONE ENCOUNTER
Reason for call:  Other   Patient called regarding (reason for call): call back  Additional comments: Patient is calling because he wants to know if he can be squeezed into Dr. Baker's schedule because he is having pain in his groin and right hip pretty bad and would like to see his primary if possible. Please call back     Phone number to reach patient:  Home number on file [phone] (home)    Best Time:  any    Can we leave a detailed message on this number?  YES

## 2019-07-10 NOTE — TELEPHONE ENCOUNTER
Appointment made with Tiffani Levi PA-C today  Pain in right groin rated at 2/10 at rest  Sometime pain is in the groin and down to the knee  Hurts worse with ambulation  Denies any redness, swelling, warmth, or recent injuries  Denies any other symptoms besides the pain    Karen Godwin RN

## 2019-07-11 ENCOUNTER — MYC MEDICAL ADVICE (OUTPATIENT)
Dept: FAMILY MEDICINE | Facility: CLINIC | Age: 79
End: 2019-07-11

## 2019-07-25 ENCOUNTER — THERAPY VISIT (OUTPATIENT)
Dept: PHYSICAL THERAPY | Facility: CLINIC | Age: 79
End: 2019-07-25
Attending: PHYSICIAN ASSISTANT
Payer: COMMERCIAL

## 2019-07-25 DIAGNOSIS — M25.551 HIP PAIN, RIGHT: ICD-10-CM

## 2019-07-25 PROCEDURE — 97110 THERAPEUTIC EXERCISES: CPT | Mod: GP | Performed by: PHYSICAL THERAPIST

## 2019-07-25 PROCEDURE — 97140 MANUAL THERAPY 1/> REGIONS: CPT | Mod: GP | Performed by: PHYSICAL THERAPIST

## 2019-07-25 PROCEDURE — 97161 PT EVAL LOW COMPLEX 20 MIN: CPT | Mod: GP | Performed by: PHYSICAL THERAPIST

## 2019-07-25 ASSESSMENT — ACTIVITIES OF DAILY LIVING (ADL)
PUTTING_ON_SOCKS_AND_SHOES: NO DIFFICULTY AT ALL
GOING_DOWN_1_FLIGHT_OF_STAIRS: MODERATE DIFFICULTY
WALKING_APPROXIMATELY_10_MINUTES: MODERATE DIFFICULTY
GETTING_INTO_AND_OUT_OF_AN_AVERAGE_CAR: NO DIFFICULTY AT ALL
WALKING_INITIALLY: MODERATE DIFFICULTY
DEEP_SQUATTING: MODERATE DIFFICULTY
GOING_UP_1_FLIGHT_OF_STAIRS: MODERATE DIFFICULTY
WALKING_DOWN_STEEP_HILLS: MODERATE DIFFICULTY
HOS_ADL_COUNT: 12
STEPPING_UP_AND_DOWN_CURBS: SLIGHT DIFFICULTY
WALKING_UP_STEEP_HILLS: EXTREME DIFFICULTY
STANDING_FOR_15_MINUTES: EXTREME DIFFICULTY
HOS_ADL_ITEM_SCORE_TOTAL: 24
SITTING_FOR_15_MINUTES: NO DIFFICULTY AT ALL
WALKING_15_MINUTES_OR_GREATER: EXTREME DIFFICULTY
GETTING_INTO_AND_OUT_OF_A_BATHTUB: MODERATE DIFFICULTY
HOS_ADL_HIGHEST_POTENTIAL_SCORE: 48

## 2019-07-25 NOTE — PROGRESS NOTES
[facility] Initial Evaluation  Subjective:    Type of problem:  Right hip   Condition occurred with:  Repetition/overuse. This is a new condition   Problem details: Patient reports the onset of right lateral hip and groin pain in July 2019. Patient reports splitting wood and mowing his yard on the day that the pain started. .   Patient reports pain:  Anterior and greater trochanter. Radiates to:  Thigh. Associated symptoms:  Loss of motion/stiffness and loss of strength. Symptoms are exacerbated by standing, weight bearing and walking Relieved by: sitting.    Where condition occurred: at home.  and reported as 7/10 on pain scale. General health as reported by patient is good.          Primary job tasks: retired.  Pain is described as aching  Pain is worse during the day. Since onset symptoms are unchanged. Special tests:  X-ray (see epic).          Red flags:  None as reported by patient.                      Objective:  Standing Alignment:    Cervical/Thoracic:  Forward head  Shoulder/UE:  Rounded shoulders                             Lumbar/SI Evaluation  ROM:    AROM Lumbar:   Flexion:            Fingers to mid lower leg  Ext:                    Mod loss   Side Bend:        Left:  Hand to knee    Right:  Hand to knee  Rotation:           Left:  WFL    Right:  WFL  Side Glide:        Left:     Right:           Lumbar Myotomes:  Lumbar myotomes: grossly 4+/5 bilaterally.                  Neural Tension/Mobility:  Lumbar:  Normal        Lumbar Palpation:      Tenderness present at Right: Piriformis and Gluteus Medius                                          Hip Evaluation  HIP AROM:    Flexion: Left:   Right:  120 deg    Extension: Left:   Right:  10 deg  Abduction: Left:    Right:  40 deg    Adduction: Left:   Right: 10 deg, mild pain  Internal Rotation: Left:   Right: 0 deg, mild pain  External Rotation: Left:   Right: 30 deg        Hip Strength:    Flexion:   Left: 4+/5   Pain:  Right: 4+/5    Pain:                    Extension:  Left: 4/5  Pain:Right: 4-/5    Pain:    Abduction:  Left: 4-/5     Pain:Right: 4-/5    Pain:  Adduction:  Left: 4+/5    Pain:Right: 4+/5   Pain:  Internal Rotation:  Left: 4/5    Pain:Right: 4/5   Pain:  External Rotation:  Left: 4/5   Pain:  Right: 4/5   Pain:            Hip Special Testing:       Right hip positive for the following special tests:  Jaxson and Fadir/Labrum    Hip Palpation:      Right hip tenderness present at:  Greater Trachanter; hip flexors and Gluteus Medius  Functional Testing:  not assessed                       General     ROS    Assessment/Plan:    Patient is a 79 year old male with lumbar complaints.    Patient has the following significant findings with corresponding treatment plan.                Diagnosis 1:  Low back pain  Pain -  hot/cold therapy, self management, education and home program  Decreased ROM/flexibility - manual therapy, therapeutic exercise, therapeutic activity and home program  Decreased joint mobility - manual therapy, therapeutic exercise, therapeutic activity and home program  Decreased strength - therapeutic exercise, therapeutic activities and home program  Decreased function - therapeutic activities and home program  Impaired posture - neuro re-education, therapeutic activities and home program    Therapy Evaluation Codes:   1) History comprised of:   Personal factors that impact the plan of care:      None.    Comorbidity factors that impact the plan of care are:      None.     Medications impacting care: None.  2) Examination of Body Systems comprised of:   Body structures and functions that impact the plan of care:      Hip and Lumbar spine.   Activity limitations that impact the plan of care are:      Bathing, Dressing, Lifting, Standing and Walking.  3) Clinical presentation characteristics are:   Stable/Uncomplicated.  4) Decision-Making    Low complexity using standardized patient assessment instrument and/or  measureable assessment of functional outcome.  Cumulative Therapy Evaluation is: Low complexity.    Previous and current functional limitations:  (See Goal Flow Sheet for this information)    Short term and Long term goals: (See Goal Flow Sheet for this information)     Communication ability:  Patient appears to be able to clearly communicate and understand verbal and written communication and follow directions correctly.  Treatment Explanation - The following has been discussed with the patient:   RX ordered/plan of care  Anticipated outcomes  Possible risks and side effects  This patient would benefit from PT intervention to resume normal activities.   Rehab potential is good.    Frequency:  1 X week, once daily  Duration:  for 6 weeks  Discharge Plan:  Achieve all LTG.  Independent in home treatment program.  Reach maximal therapeutic benefit.    Please refer to the daily flowsheet for treatment today, total treatment time and time spent performing 1:1 timed codes.

## 2019-07-25 NOTE — PROGRESS NOTES
[facility] Initial Evaluation  Subjective:      General health as reported by patient is good. Pertinent medical history includes:  Diabetes and high blood pressure.    Surgeries include:  Other.  Current medications:  High blood pressure medication and other.              Patient is retired.                           Objective:  System    Physical Exam    General     ROS    Assessment/Plan:

## 2019-07-25 NOTE — LETTER
RANDALL MANLEY PT  [address]  [address]  [place] [address]  [phone]    2019    Re: Zoey Romo   :   1940  MRN:  [id number]   REFERRING PHYSICIAN:   LEX Perez PT  Date of Initial Evaluation:  2019  Visits:  Rxs Used: 1  Reason for Referral:  Hip pain, right    EVALUATION SUMMARY    [facility] Initial Evaluation  Subjective:  Type of problem:  Right hip  Condition occurred with:  Repetition/overuse. This is a new condition   Problem details: Patient reports the onset of right lateral hip and groin pain in 2019. Patient reports splitting wood and mowing his yard on the day that the pain started. .   Patient reports pain:  Anterior and greater trochanter. Radiates to:  Thigh. Associated symptoms:  Loss of motion/stiffness and loss of strength. Symptoms are exacerbated by standing, weight bearing and walking Relieved by: sitting.    Where condition occurred: at home.  and reported as 7/10 on pain scale. General health as reported by patient is good. Primary job tasks: retired.  Pain is described as aching  Pain is worse during the day. Since onset symptoms are unchanged. Special tests:  X-ray (see epic).          Red flags:  None as reported by patient.    Objective:  Standing Alignment:    Cervical/Thoracic:  Forward head  Shoulder/UE:  Rounded shoulders       Lumbar/SI Evaluation  ROM:    AROM Lumbar:   Flexion:            Fingers to mid lower leg  Ext:                    Mod loss   Side Bend:        Left:  Hand to knee    Right:  Hand to knee  Rotation:           Left:  WFL    Right:  WFL  Side Glide:        Left:     Right:         Lumbar Myotomes:  Lumbar myotomes: grossly 4+/5 bilaterally.  Neural Tension/Mobility:  Lumbar:  Normal      Re: Zoey Romo   :   1940    Lumbar Palpation:    Tenderness present at Right: Piriformis and Gluteus Medius       Hip Evaluation  HIP AROM:    Flexion: Left:   Right:   120 deg  Extension: Left:   Right:  10 deg  Abduction: Left:    Right:  40 deg  Adduction: Left:   Right: 10 deg, mild pain  Internal Rotation: Left:   Right: 0 deg, mild pain  External Rotation: Left:   Right: 30 deg    Hip Strength:    Flexion:   Left: 4+/5   Pain:  Right: 4+/5   Pain:              Extension:  Left: 4/5  Pain:Right: 4-/5    Pain:    Abduction:  Left: 4-/5     Pain:Right: 4-/5    Pain:  Adduction:  Left: 4+/5    Pain:Right: 4+/5   Pain:  Internal Rotation:  Left: 4/5    Pain:Right: 4/5   Pain:  External Rotation:  Left: 4/5   Pain:  Right: 4/5   Pain:    Hip Special Testing:    Right hip positive for the following special tests:  Jaxson and Fadir/Labrum      Hip Palpation:    Right hip tenderness present at:  Greater Trachanter; hip flexors and Gluteus Medius    Functional Testing:  not assessed    Assessment/Plan:    Patient is a 79 year old male with lumbar complaints.    Patient has the following significant findings with corresponding treatment plan.                Diagnosis 1:  Low back pain  Pain -  hot/cold therapy, self management, education and home program  Decreased ROM/flexibility - manual therapy, therapeutic exercise, therapeutic activity and home program  Decreased joint mobility - manual therapy, therapeutic exercise, therapeutic activity and home program  Decreased strength - therapeutic exercise, therapeutic activities and home program  Decreased function - therapeutic activities and home program  Impaired posture - neuro re-education, therapeutic activities and home program    Therapy Evaluation Codes:   1) History comprised of:   Personal factors that impact the plan of care:      None.    Comorbidity factors that impact the plan of care are:      None.     Medications impacting care: None.  2) Examination of Body Systems comprised of:   Body structures and functions that impact the plan of care:      Hip and Lumbar spine.  Re: Zoey Romo   :   1940     Activity  limitations that impact the plan of care are:      Bathing, Dressing, Lifting, Standing and Walking.  3) Clinical presentation characteristics are:   Stable/Uncomplicated.  4) Decision-Making    Low complexity using standardized patient assessment instrument and/or measureable assessment of functional outcome.  Cumulative Therapy Evaluation is: Low complexity.    Previous and current functional limitations:  (See Goal Flow Sheet for this information)    Short term and Long term goals: (See Goal Flow Sheet for this information)     Communication ability:  Patient appears to be able to clearly communicate and understand verbal and written communication and follow directions correctly.  Treatment Explanation - The following has been discussed with the patient:   RX ordered/plan of care  Anticipated outcomes  Possible risks and side effects  This patient would benefit from PT intervention to resume normal activities.   Rehab potential is good.    Frequency:  1 X week, once daily  Duration:  for 6 weeks  Discharge Plan:  Achieve all LTG.  Independent in home treatment program.  Reach maximal therapeutic benefit.    Please refer to the daily flowsheet for treatment today, total treatment time and time spent performing 1:1 timed codes.       Thank you for your referral.    INQUIRIES  Therapist: LOYD Trejo PT  [address]  [address]  [place] [address]  Phone: [phone]  Fax: [phone]

## 2019-07-26 ENCOUNTER — TRANSFERRED RECORDS (OUTPATIENT)
Dept: HEALTH INFORMATION MANAGEMENT | Facility: CLINIC | Age: 79
End: 2019-07-26

## 2019-08-01 ENCOUNTER — THERAPY VISIT (OUTPATIENT)
Dept: PHYSICAL THERAPY | Facility: CLINIC | Age: 79
End: 2019-08-01
Payer: COMMERCIAL

## 2019-08-01 DIAGNOSIS — M25.551 HIP PAIN, RIGHT: ICD-10-CM

## 2019-08-01 PROCEDURE — 97110 THERAPEUTIC EXERCISES: CPT | Mod: GP | Performed by: PHYSICAL THERAPIST

## 2019-08-08 ENCOUNTER — THERAPY VISIT (OUTPATIENT)
Dept: PHYSICAL THERAPY | Facility: CLINIC | Age: 79
End: 2019-08-08
Payer: COMMERCIAL

## 2019-08-08 DIAGNOSIS — M25.551 HIP PAIN, RIGHT: ICD-10-CM

## 2019-08-08 PROCEDURE — 97110 THERAPEUTIC EXERCISES: CPT | Mod: GP | Performed by: PHYSICAL THERAPIST

## 2019-08-08 NOTE — PROGRESS NOTES
Subjective:  HPI                    Objective:  System    Physical Exam    General     ROS    Assessment/Plan:    PROGRESS  REPORT    Progress reporting period is from 7/25/2019 to 8/8/2019.       SUBJECTIVE  Subjective changes noted by patient:  Subjective: Patient reports his symptoms are improving each day. Patient reports full compliance with his HEP    Current pain level is  Current Pain level: 1/10.     Previous pain level was   Initial Pain level: 4/10.   Changes in function:  Yes (See Goal flowsheet attached for changes in current functional level)  Adverse reaction to treatment or activity: None    OBJECTIVE  Changes noted in objective findings:  Yes,   Objective: Right hip AROM WFL throughout. Right hip strength grossly 4+/5 except abduction 4/5Lumbar AROM WNL throughout. Lumbar myotomes grossly 4+/5 bilaterally. tenderness over right piriformis    ASSESSMENT/PLAN  Updated problem list and treatment plan: Diagnosis 1:  Low back pain  Decreased strength - home program  STG/LTGs have been met or progress has been made towards goals:  Yes (See Goal flow sheet completed today.)  Assessment of Progress: The patient's condition is improving.  The patient has met all of their long term goals.  Self Management Plans:  Patient is independent in a home treatment program.  Patient is independent in self management of symptoms.  I have re-evaluated this patient and find that the nature, scope, duration and intensity of the therapy is appropriate for the medical condition of the patient.  Zoey continues to require the following intervention to meet STG and LTG's:  PT intervention is no longer required to meet STG/LTG.    Recommendations:  This patient is ready to be discharged from therapy and continue their home treatment program.    Please refer to the daily flowsheet for treatment today, total treatment time and time spent performing 1:1 timed codes.

## 2019-10-28 NOTE — PROGRESS NOTES
Subjective   Patient states he knows he is due for an eye exam and will get that scheduled, he does go here!   Zoey Romo is a 79 year old male who presents to clinic today for the following health issues:    Patient Instructions on Last Visit 4/26/2019:  Patient Instructions   Schedule your eye exam  Consider screening for carotid stenosis with [facility] as this isn't covered by Medicare.       HPI       Diabetes Follow-up  Patient notes his blood sugars are in the 120's early in the morning, but more elevated later in the day. His A1C as of today was 6.3. He has been trying to lose weight. He has not had an eye exam within the last 12 months.    Lab Results   Component Value Date    A1C 6.3 10/30/2019    A1C 6.3 04/26/2019    A1C 6.4 04/18/2018    A1C 6.2 09/27/2017    A1C 6.4 03/21/2017     Exercise  He has not been walking regularly, citing decreased activity related to hip pain. He followed with physical therapy for hip pain.    Dermatology  He had his moles checked in July 2019. He reports his moles were benign.    Additional Notes  He continues to work and stay busy.      Patient Active Problem List   Diagnosis     GERD (gastroesophageal reflux disease)     HYPERLIPIDEMIA LDL GOAL <100     AAA (abdominal aortic aneurysm) (H)     Dermatochalasis OU     NORMA (obstructive sleep apnea)     Advanced directives, counseling/discussion     Gout     Cataract OU     Lumbar stenosis     Spondylolisthesis of lumbar region     Fusion of spine     Eczema     CKD (chronic kidney disease) stage 2, GFR 60-89 ml/min     Hypercalcemia     Type 2 diabetes mellitus with diabetic nephropathy (H)     Gastroesophageal reflux disease without esophagitis     Chronic gout without tophus, unspecified cause, unspecified site     Abdominal aortic aneurysm without rupture (H)     HTN, goal below 130/80     Epistaxis     Type 2 diabetes mellitus without complication, without long-term current use of insulin (H)     Multiple nevi      Obesity (BMI 35.0-39.9) with comorbidity (H)     History of basal cell cancer    fj Past Surgical History:   Procedure Laterality Date     BIOPSY       COLONOSCOPY       OPTICAL TRACKING SYSTEM FUSION POSTERIOR SPINE LUMBAR  2013    Procedure: OPTICAL TRACKING SYSTEM FUSION SPINE POSTERIOR LUMBAR ONE LEVEL;  Lumbar 4-5 Laminectomy, Decompression; Lumbar 4-5 Transforaminal Lumbar Interbody Fusion ;  Surgeon: Saurav Valdez MD;  Location: [facility] OR       Social History     Tobacco Use     Smoking status: Former Smoker     Packs/day: 0.50     Years: 10.00     Pack years: 5.00     Types: Cigars     Last attempt to quit: 1985     Years since quittin.7     Smokeless tobacco: Never Used   Substance Use Topics     Alcohol use: Yes     Alcohol/week: 5.8 standard drinks     Comment: 2 per month     Family History   Problem Relation Age of Onset     Heart Disease Father      Cancer Paternal Grandfather         leukemia      Heart Disease Brother      Diabetes Brother          Current Outpatient Medications   Medication Sig Dispense Refill     acetaminophen (TYLENOL) 650 MG CR tablet Take 1 tablet (650 mg) by mouth every 8 hours as needed for pain 60 tablet 1     allopurinol (ZYLOPRIM) 100 MG tablet TAKE 1 TABLET (100 MG) BY MOUTH 2 TIMES DAILY 180 tablet 3     ammonium lactate (LAC-HYDRIN) 12 % cream Apply topically 2 times daily as needed for dry skin 385 g 3     aspirin 81 MG tablet Take 1 tablet by mouth daily.       blood glucose monitoring (GOOD CONTOUR) test strip 1 strip by In Vitro route daily Use to test blood sugars  daily or as directed. 1 Box 11     chlorthalidone (HYGROTON) 25 MG tablet Take 1 tablet (25 mg) by mouth daily 90 tablet 4     diltiazem ER COATED BEADS (CARDIZEM LA) 240 MG 24 hr tablet Take 1 tablet (240 mg) by mouth daily 90 tablet 4     GLUCOSAMINE CHONDROITIN COMPLX PO        lisinopril (PRINIVIL/ZESTRIL) 40 MG tablet Take 1 tablet (40 mg) by mouth daily 90 tablet 3      "metFORMIN (GLUCOPHAGE) 1000 MG tablet Take 1 tablet (1,000 mg) by mouth 2 times daily (with meals) Need to see MD for further refills 180 tablet 11     mometasone (ELOCON) 0.1 % cream Apply sparingly to affected area twice daily as needed.  Do not apply to face. 45 g 3     pravastatin (PRAVACHOL) 80 MG tablet Take 1 tablet (80 mg) by mouth daily 90 tablet 3     Allergies   Allergen Reactions     Pollen [Pollen Extract]        Reviewed and updated as needed this visit by Provider  Tobacco  Allergies  Meds  Problems  Med Hx  Surg Hx  Fam Hx         Review of Systems   ROS COMP: Constitutional, HEENT, cardiovascular, pulmonary, GI, , musculoskeletal, neuro, skin, endocrine and psych systems are negative, except as otherwise noted.    This document serves as a record of the services and decisions personally performed and made by Gaby Baker MD. It was created on her behalf by Shruthi Abarca, a trained medical scribe. The creation of this document is based on the provider's statements to the medical scribe.  Shruthi Abarca 10:29 AM 10/30/2019          Objective    /78 (BP Location: Left arm, Cuff Size: Adult Regular)   Pulse 85   Temp 97  F (36.1  C) (Oral)   Resp 17   Ht 1.702 m (5' 7.01\")   Wt 99.8 kg (220 lb)   SpO2 98%   BMI 34.45 kg/m    Body mass index is 34.45 kg/m .  Physical Exam   GENERAL: healthy, alert and no distress  EYES: Eyes grossly normal to inspection, PERRL and conjunctivae and sclerae normal  HENT: ear canals and TM's normal, nose and mouth without ulcers or lesions  NECK: no adenopathy, no asymmetry, masses, or scars and thyroid normal to palpation  RESP: lungs clear to auscultation - no rales, rhonchi or wheezes  CV: regular rate and rhythm with periodic skipped beats, normal S1 S2, no S3 or S4, no murmur, click or rub, no peripheral edema and peripheral pulses strong,   ABDOMEN: soft, nontender, no hepatosplenomegaly, no masses and bowel sounds normal  MS: no gross " musculoskeletal defects noted, no edema  SKIN: no suspicious lesions or rashes, scattered moles on face (follows with Dermatology)  NEURO: Normal strength and tone, mentation intact and speech normal  BACK: no CVA tenderness, no paralumbar tenderness  PSYCH: mentation appears normal, affect normal/bright    Diagnostic Test Results:  Labs reviewed in Epic  Results for orders placed or performed in visit on 10/30/19 (from the past 24 hour(s))   HEMOGLOBIN A1C   Result Value Ref Range    Hemoglobin A1C 6.3 (H) 0 - 5.6 %           Assessment & Plan     1. Type 2 diabetes mellitus with diabetic nephropathy, without long-term current use of insulin (H)  Controlled with current medication. A1C 6.3 as of today. Continue current medications.   - HEMOGLOBIN A1C  - **A1C FUTURE 6mo; Future  - Lipid panel reflex to direct LDL Fasting; Future    2. CKD (chronic kidney disease) stage 2, GFR 60-89 ml/min  Stable. Labs today. Will adjust medication treatment pending lab results as needed.  - Basic metabolic panel  - Protein  random urine with Creat Ratio  - **Comprehensive metabolic panel FUTURE 6mo; Future  - **TSH with free T4 reflex FUTURE 6mo; Future    3. Hypercalcemia  Likely due to thiazide.  Has had negative workup otherwise   - **Comprehensive metabolic panel FUTURE 6mo; Future  - **CBC with platelets FUTURE 6mo; Future    4. Hypertension goal BP (blood pressure) < 130/80  Hypertension controlled. Blood pressure at goal. Continue current medications.  - **TSH with free T4 reflex FUTURE 6mo; Future         Patient Instructions   Please schedule an eye exam.    Follow up in 6 months for your physical.          Return in about 6 months (around 4/30/2020) for Physical Exam.    I spent 18 minutes of time with the patient and >50% of it was in education and counseling regarding health maintenance and medication recheck.      The information in this document, created by the medical scribe, Shruthi Abarca, for me, accurately  reflects the services I personally performed and the decisions made by me. I have reviewed and approved this document for accuracy prior to leaving the patient care area.    Gaby Baker MD  [facility]

## 2019-10-30 ENCOUNTER — OFFICE VISIT (OUTPATIENT)
Dept: INTERNAL MEDICINE | Facility: CLINIC | Age: 79
End: 2019-10-30
Payer: COMMERCIAL

## 2019-10-30 VITALS
BODY MASS INDEX: 34.53 KG/M2 | OXYGEN SATURATION: 98 % | WEIGHT: 220 LBS | SYSTOLIC BLOOD PRESSURE: 122 MMHG | DIASTOLIC BLOOD PRESSURE: 78 MMHG | RESPIRATION RATE: 17 BRPM | HEART RATE: 85 BPM | TEMPERATURE: 97 F | HEIGHT: 67 IN

## 2019-10-30 DIAGNOSIS — I10 HYPERTENSION GOAL BP (BLOOD PRESSURE) < 130/80: ICD-10-CM

## 2019-10-30 DIAGNOSIS — E83.52 HYPERCALCEMIA: ICD-10-CM

## 2019-10-30 DIAGNOSIS — N18.2 CKD (CHRONIC KIDNEY DISEASE) STAGE 2, GFR 60-89 ML/MIN: ICD-10-CM

## 2019-10-30 DIAGNOSIS — E11.21 TYPE 2 DIABETES MELLITUS WITH DIABETIC NEPHROPATHY, WITHOUT LONG-TERM CURRENT USE OF INSULIN (H): Primary | ICD-10-CM

## 2019-10-30 LAB
ANION GAP SERPL CALCULATED.3IONS-SCNC: 4 MMOL/L (ref 3–14)
BUN SERPL-MCNC: 18 MG/DL (ref 7–30)
CALCIUM SERPL-MCNC: 10.4 MG/DL (ref 8.5–10.1)
CHLORIDE SERPL-SCNC: 101 MMOL/L (ref 94–109)
CO2 SERPL-SCNC: 29 MMOL/L (ref 20–32)
CREAT SERPL-MCNC: 0.95 MG/DL (ref 0.66–1.25)
CREAT UR-MCNC: 131 MG/DL
GFR SERPL CREATININE-BSD FRML MDRD: 75 ML/MIN/{1.73_M2}
GLUCOSE SERPL-MCNC: 124 MG/DL (ref 70–99)
HBA1C MFR BLD: 6.3 % (ref 0–5.6)
POTASSIUM SERPL-SCNC: 3.7 MMOL/L (ref 3.4–5.3)
PROT UR-MCNC: 0.86 G/L
PROT/CREAT 24H UR: 0.66 G/G CR (ref 0–0.2)
SODIUM SERPL-SCNC: 134 MMOL/L (ref 133–144)

## 2019-10-30 PROCEDURE — 83036 HEMOGLOBIN GLYCOSYLATED A1C: CPT | Performed by: INTERNAL MEDICINE

## 2019-10-30 PROCEDURE — 99214 OFFICE O/P EST MOD 30 MIN: CPT | Performed by: INTERNAL MEDICINE

## 2019-10-30 PROCEDURE — 80048 BASIC METABOLIC PNL TOTAL CA: CPT | Performed by: INTERNAL MEDICINE

## 2019-10-30 PROCEDURE — 36415 COLL VENOUS BLD VENIPUNCTURE: CPT | Performed by: INTERNAL MEDICINE

## 2019-10-30 PROCEDURE — 84156 ASSAY OF PROTEIN URINE: CPT | Performed by: INTERNAL MEDICINE

## 2019-10-30 ASSESSMENT — MIFFLIN-ST. JEOR: SCORE: 1666.66

## 2019-10-30 ASSESSMENT — PAIN SCALES - GENERAL: PAINLEVEL: NO PAIN (0)

## 2019-10-31 NOTE — RESULT ENCOUNTER NOTE
The amount of protein in your urine continues to climb but your kidney function remains normal so I don't feel we need to do anything else at this point. Normal electrolytes. Stable calcium.  Good 3 month sugar average.

## 2019-12-09 NOTE — Clinical Note
Negative diabetic retinopathy  Verbalized Understanding/Simple: Patient demonstrates quick and easy understanding

## 2020-01-10 ENCOUNTER — OFFICE VISIT (OUTPATIENT)
Dept: OPHTHALMOLOGY | Facility: CLINIC | Age: 80
End: 2020-01-10
Payer: COMMERCIAL

## 2020-01-10 DIAGNOSIS — H25.813 COMBINED FORMS OF AGE-RELATED CATARACT OF BOTH EYES: ICD-10-CM

## 2020-01-10 DIAGNOSIS — H52.223 REGULAR ASTIGMATISM OF BOTH EYES: ICD-10-CM

## 2020-01-10 DIAGNOSIS — E11.9 TYPE 2 DIABETES MELLITUS WITHOUT COMPLICATION, WITHOUT LONG-TERM CURRENT USE OF INSULIN (H): ICD-10-CM

## 2020-01-10 DIAGNOSIS — H52.4 PRESBYOPIA: ICD-10-CM

## 2020-01-10 DIAGNOSIS — Z01.00 EXAMINATION OF EYES AND VISION: Primary | ICD-10-CM

## 2020-01-10 DIAGNOSIS — H52.13 MYOPIA OF BOTH EYES: ICD-10-CM

## 2020-01-10 PROCEDURE — 92015 DETERMINE REFRACTIVE STATE: CPT | Performed by: STUDENT IN AN ORGANIZED HEALTH CARE EDUCATION/TRAINING PROGRAM

## 2020-01-10 PROCEDURE — 92014 COMPRE OPH EXAM EST PT 1/>: CPT | Performed by: STUDENT IN AN ORGANIZED HEALTH CARE EDUCATION/TRAINING PROGRAM

## 2020-01-10 ASSESSMENT — EXTERNAL EXAM - LEFT EYE: OS_EXAM: NORMAL

## 2020-01-10 ASSESSMENT — CUP TO DISC RATIO
OS_RATIO: 0.3
OD_RATIO: 0.3

## 2020-01-10 ASSESSMENT — VISUAL ACUITY
OD_CC: 20/40
OD_CC: J2+
METHOD: SNELLEN - LINEAR
OS_CC: J1
OS_CC: 20/30

## 2020-01-10 ASSESSMENT — CONF VISUAL FIELD
OD_NORMAL: 1
OS_NORMAL: 1

## 2020-01-10 ASSESSMENT — REFRACTION_MANIFEST
OD_SPHERE: -0.50
OS_SPHERE: -0.25
OS_CYLINDER: +0.50
OD_CYLINDER: +1.00
OD_ADD: +3.00
OS_ADD: +3.00
OS_AXIS: 007
OD_AXIS: 160

## 2020-01-10 ASSESSMENT — REFRACTION_WEARINGRX
OD_SPHERE: +2.50
OS_SPHERE: +2.50
OD_CYLINDER: SPHERE
OS_CYLINDER: SPHERE
SPECS_TYPE: READERS

## 2020-01-10 ASSESSMENT — TONOMETRY
OS_IOP_MMHG: 20
OD_IOP_MMHG: 20
IOP_METHOD: APPLANATION

## 2020-01-10 ASSESSMENT — EXTERNAL EXAM - RIGHT EYE: OD_EXAM: NORMAL

## 2020-01-10 NOTE — LETTER
1/10/2020         RE: Zoey Romo  [address] [address]  [facility] [address]        Dear Colleague,    Thank you for referring your patient, Zoey Romo, to the [facility]. Please see a copy of my visit note below.     Current Eye Medications:  no     Subjective:  Diabetic eye exam.  Pt reports that he continues to see well in distance without glasses and sees reading well with his over the counter readers.  Lab Results   Component Value Date    A1C 6.3 10/30/2019    A1C 6.3 04/26/2019    A1C 6.4 04/18/2018    A1C 6.2 09/27/2017    A1C 6.4 03/21/2017     Objective:  See Ophthalmology Exam.       Assessment:  Zoey Romo is a 79 year old male who presents with:   Encounter Diagnoses   Name Primary?     Examination of eyes and vision      Myopia of both eyes      Regular astigmatism of both eyes      Presbyopia        Type 2 diabetes mellitus without complication, without long-term current use of insulin (H) Negative diabetic retinopathy      Combined forms of age-related cataract of both eyes Early visually significant        Plan:  Ok to continue over the counter readers    Keep blood sugars and blood pressure under good control.    Johnny Cowan MD  [phone]    Patient Education   Diabetes weakens the blood vessels all over the body, including the eyes. Damage to the blood vessels in the eyes can cause swelling or bleeding into part of the eye (called the retina). This is called diabetic retinopathy (LEANNE-tin-AH-puh-thee). If not treated, this disease can cause vision loss or blindness.   Symptoms may include blurred or distorted vision, but many people have no symptoms. It's important to see your eye doctor regularly to check for problems.   Early treatment and good control can help protect your vision. Here are the things you can do to help prevent vision loss:      1. Keep your blood sugar levels under tight control.      2. Bring high blood pressure under control.      3.  No smoking.      4. Have yearly dilated eye exams.           Again, thank you for allowing me to participate in the care of your patient.        Sincerely,        Johnny Cowan MD

## 2020-01-10 NOTE — PATIENT INSTRUCTIONS
Ok to continue over the counter readers    Keep blood sugars and blood pressure under good control.    Johnny Cowan MD  [phone]    Patient Education   Diabetes weakens the blood vessels all over the body, including the eyes. Damage to the blood vessels in the eyes can cause swelling or bleeding into part of the eye (called the retina). This is called diabetic retinopathy (LEANNE-tin-AH-puh-thee). If not treated, this disease can cause vision loss or blindness.   Symptoms may include blurred or distorted vision, but many people have no symptoms. It's important to see your eye doctor regularly to check for problems.   Early treatment and good control can help protect your vision. Here are the things you can do to help prevent vision loss:      1. Keep your blood sugar levels under tight control.      2. Bring high blood pressure under control.      3. No smoking.      4. Have yearly dilated eye exams.

## 2020-01-10 NOTE — PROGRESS NOTES
Current Eye Medications:  no     Subjective:  Diabetic eye exam.  Pt reports that he continues to see well in distance without glasses and sees reading well with his over the counter readers.  Lab Results   Component Value Date    A1C 6.3 10/30/2019    A1C 6.3 04/26/2019    A1C 6.4 04/18/2018    A1C 6.2 09/27/2017    A1C 6.4 03/21/2017     Objective:  See Ophthalmology Exam.       Assessment:  Zoey Romo is a 79 year old male who presents with:   Encounter Diagnoses   Name Primary?     Examination of eyes and vision      Myopia of both eyes      Regular astigmatism of both eyes      Presbyopia        Type 2 diabetes mellitus without complication, without long-term current use of insulin (H) Negative diabetic retinopathy      Combined forms of age-related cataract of both eyes Early visually significant        Plan:  Ok to continue over the counter readers    Keep blood sugars and blood pressure under good control.    Johnny Cowan MD  [phone]    Patient Education   Diabetes weakens the blood vessels all over the body, including the eyes. Damage to the blood vessels in the eyes can cause swelling or bleeding into part of the eye (called the retina). This is called diabetic retinopathy (LEANNE-tin--puh-thee). If not treated, this disease can cause vision loss or blindness.   Symptoms may include blurred or distorted vision, but many people have no symptoms. It's important to see your eye doctor regularly to check for problems.   Early treatment and good control can help protect your vision. Here are the things you can do to help prevent vision loss:      1. Keep your blood sugar levels under tight control.      2. Bring high blood pressure under control.      3. No smoking.      4. Have yearly dilated eye exams.         
done
done
not applicable
done
not applicable

## 2020-02-23 ENCOUNTER — HEALTH MAINTENANCE LETTER (OUTPATIENT)
Age: 80
End: 2020-02-23

## 2020-04-30 ENCOUNTER — MYC REFILL (OUTPATIENT)
Dept: INTERNAL MEDICINE | Facility: CLINIC | Age: 80
End: 2020-04-30

## 2020-04-30 DIAGNOSIS — I10 HTN, GOAL BELOW 130/80: ICD-10-CM

## 2020-04-30 DIAGNOSIS — M1A.9XX0 CHRONIC GOUT WITHOUT TOPHUS, UNSPECIFIED CAUSE, UNSPECIFIED SITE: ICD-10-CM

## 2020-04-30 DIAGNOSIS — E78.5 HYPERLIPIDEMIA LDL GOAL <100: ICD-10-CM

## 2020-04-30 DIAGNOSIS — E11.9 TYPE 2 DIABETES MELLITUS WITHOUT COMPLICATION, WITHOUT LONG-TERM CURRENT USE OF INSULIN (H): ICD-10-CM

## 2020-04-30 DIAGNOSIS — I10 HYPERTENSION GOAL BP (BLOOD PRESSURE) < 130/80: ICD-10-CM

## 2020-05-01 RX ORDER — LISINOPRIL 40 MG/1
40 TABLET ORAL DAILY
Qty: 90 TABLET | Refills: 0 | Status: SHIPPED | OUTPATIENT
Start: 2020-05-01 | End: 2020-09-17

## 2020-05-01 RX ORDER — CHLORTHALIDONE 25 MG/1
25 TABLET ORAL DAILY
Qty: 90 TABLET | Refills: 0 | Status: SHIPPED | OUTPATIENT
Start: 2020-05-01 | End: 2020-09-17

## 2020-05-01 RX ORDER — ALLOPURINOL 100 MG/1
TABLET ORAL
Qty: 180 TABLET | Refills: 3 | Status: SHIPPED | OUTPATIENT
Start: 2020-05-01 | End: 2020-09-17

## 2020-05-01 RX ORDER — PRAVASTATIN SODIUM 80 MG/1
80 TABLET ORAL DAILY
Qty: 90 TABLET | Refills: 3 | Status: SHIPPED | OUTPATIENT
Start: 2020-05-01 | End: 2020-09-17

## 2020-05-01 NOTE — TELEPHONE ENCOUNTER
Routing refill request to provider for review/approval because:  Labs not current:  ALT, CBC, Uric Acid, LDL

## 2020-05-04 NOTE — TELEPHONE ENCOUNTER
Patient calling regarding message below, states that he would want to come in to see Dr. Baker face-to-face & not do over the phone or virtual.

## 2020-05-04 NOTE — TELEPHONE ENCOUNTER
Left a message for patient to call to schedule an appointment with Gaby Baker MD.    Anyone can schedule an appointment with Dr. Baker.

## 2020-09-16 ASSESSMENT — ACTIVITIES OF DAILY LIVING (ADL): CURRENT_FUNCTION: NO ASSISTANCE NEEDED

## 2020-09-16 NOTE — PROGRESS NOTES
"SUBJECTIVE:   Zoey Romo is a 80 year old male who presents for Preventive Visit.    Patient has been advised of split billing requirements and indicates understanding: Yes   Are you in the first 12 months of your Medicare coverage?  No  Healthy Habits:    In general, how would you rate your overall health?  Good    Frequency of exercise:  6-7 days/week    Duration of exercise:  15-30 minutes    Do you usually eat at least 4 servings of fruit and vegetables a day, include whole grains    & fiber and avoid regularly eating high fat or \"junk\" foods?  Yes    Taking medications regularly:  Yes    Barriers to taking medications:  None    Medication side effects:  None    Ability to successfully perform activities of daily living:  No assistance needed    Home Safety:  No safety concerns identified    Hearing Impairment:  No hearing concerns    In the past 6 months, have you been bothered by leaking of urine?  No    In general, how would you rate your overall mental or emotional health?  Very good      PHQ-2 Total Score:    Additional concerns today:  No    Do you feel safe in your environment? Yes    Have you ever done Advance Care Planning? (For example, a Health Directive, POLST, or a discussion with a medical provider or your loved ones about your wishes): Yes, advance care planning is on file.    Fall risk  Fallen 2 or more times in the past year?: No  Any fall with injury in the past year?: No    Cognitive Screening   1) Repeat 3 items (Leader, Season, Table)    2) Clock draw: NORMAL  3) 3 item recall: Recalls 2 objects   Results: NORMAL clock, 1-2 items recalled: COGNITIVE IMPAIRMENT LESS LIKELY    Mini-CogTM Curtis Chadwick. Licensed by the author for use in [facility]; reprinted with permission (ashu@.[facility]). All rights reserved.      Do you have sleep apnea, excessive snoring or daytime drowsiness?: yes    Reviewed and updated as needed this visit by clinical staff         Reviewed and " updated as needed this visit by Provider        Social History     Tobacco Use     Smoking status: Former Smoker     Packs/day: 0.50     Years: 10.00     Pack years: 5.00     Types: Cigars     Last attempt to quit: 1985     Years since quittin.6     Smokeless tobacco: Never Used   Substance Use Topics     Alcohol use: Yes     Alcohol/week: 5.8 standard drinks     Comment: 2 per month   If you drink alcohol do you typically have >3 drinks per day or >7 drinks per week? No  Alcohol Use 3/21/2017   Prescreen: >3 drinks/day or >7 drinks/week? The patient does not drink >3 drinks per day nor >7 drinks per week.   No flowsheet data found.    Diabetes Follow-up  How often are you checking your blood sugar? A few times a month  What time of day are you checking your blood sugars (select all that apply)?  Before meals  Have you had any blood sugars above 200?  No  Have you had any blood sugars below 70?  No    What symptoms do you notice when your blood sugar is low?  None    What concerns do you have today about your diabetes? None     Do you have any of these symptoms? (Select all that apply)  No numbness or tingling in feet.  No redness, sores or blisters on feet.  No complaints of excessive thirst.  No reports of blurry vision.  No significant changes to weight.     When he does check in the morning, 120's.    BP Readings from Last 2 Encounters:   10/30/19 122/78   07/10/19 126/70     Hemoglobin A1C (%)   Date Value   10/30/2019 6.3 (H)   2019 6.3 (H)     LDL Cholesterol Calculated (mg/dL)   Date Value   2019 63   2018 67       Hyperlipidemia Follow-Up    Are you regularly taking any medication or supplement to lower your cholesterol?   Yes- Pravastatin    Are you having muscle aches or other side effects that you think could be caused by your cholesterol lowering medication?  No    Hypertension Follow-up    Do you check your blood pressure regularly outside of the clinic? No     Are you  "following a low salt diet? No    Are your blood pressures ever more than 140 on the top number (systolic) OR more   than 90 on the bottom number (diastolic), for example 140/90? No    Chronic Kidney Disease Follow-up    Do you take any over the counter pain medicine?: No    Current providers sharing in care for this patient include:   Patient Care Team:  Gaby Baker MD as PCP - General  Gaby Baker MD as Assigned PCP    The following health maintenance items are reviewed in Epic and correct as of today:  Health Maintenance   Topic Date Due     HEPATITIS B IMMUNIZATION (1 of 3 - Risk 3-dose series) 03/14/1959     PHQ-2  01/01/2020     MEDICARE ANNUAL WELLNESS VISIT  04/26/2020     LIPID  04/26/2020     DIABETIC FOOT EXAM  04/26/2020     FALL RISK ASSESSMENT  04/26/2020     A1C  04/30/2020     INFLUENZA VACCINE (1) 09/01/2020     BMP  10/30/2020     MICROALBUMIN  10/30/2020     EYE EXAM  01/10/2021     COLORECTAL CANCER SCREENING  05/03/2021     ADVANCE CARE PLANNING  03/21/2022     DTAP/TDAP/TD IMMUNIZATION (2 - Td) 02/01/2023     PNEUMOCOCCAL IMMUNIZATION 65+ LOW/MEDIUM RISK  Completed     IPV IMMUNIZATION  Aged Out     MENINGITIS IMMUNIZATION  Aged Out     ZOSTER IMMUNIZATION  Discontinued     Lab work is in process      Review of Systems   ROS: 10 point ROS neg other than the symptoms noted above in the HPI.     OBJECTIVE:   There were no vitals taken for this visit. Estimated body mass index is 34.45 kg/m  as calculated from the following:    Height as of 10/30/19: 1.702 m (5' 7.01\").    Weight as of 10/30/19: 99.8 kg (220 lb).  Physical Exam  GENERAL APPEARANCE: healthy, alert and no distress  EYES: Eyes grossly normal to inspection, PERRL and conjunctivae and sclerae normal  HENT: ear canals and TM's normal and nose and mouth without ulcers or lesions  NECK: no adenopathy, no asymmetry, masses, or scars, thyroid normal to palpation and no bruits  RESP: lungs clear to auscultation - no rales, rhonchi " or wheezes  CV: regular rates and rhythm and normal S1 S2, no S3 or S4  LYMPHATICS: normal ant/post cervical and supraclavicular nodes  ABDOMEN: soft, nontender, without hepatosplenomegaly or masses and bowel sounds normal    MS: extremities normal- no gross deformities noted  SKIN: no suspicious lesions or rashes  NEURO: Normal strength and tone, mentation intact and speech normal  PSYCH: mentation appears normal and affect normal/bright    Sensory exam of the foot is abnormal, tested with the monofilament. Good pulses, no lesions or ulcers, good peripheral pulses.       Diagnostic Test Results:  Labs reviewed in Epic    ASSESSMENT / PLAN:   1. Medicare annual wellness visit, subsequent    - Lipid panel reflex to direct LDL Fasting    2. Type 2 diabetes mellitus without complication, without long-term current use of insulin (H)  Improved.  Will reduce dose of metformin   - HEMOGLOBIN A1C  - FOOT EXAM  - metFORMIN (GLUCOPHAGE) 500 MG tablet; Take 1 tablet (500 mg) by mouth daily (with dinner)  Dispense: 90 tablet; Refill: 3    3. Hypertension goal BP (blood pressure) < 130/80    - **TSH with free T4 reflex FUTURE 6mo  - lisinopril (ZESTRIL) 40 MG tablet; Take 1 tablet (40 mg) by mouth daily  Dispense: 90 tablet; Refill: 4    4. CKD (chronic kidney disease) stage 2, GFR 60-89 ml/min    - **TSH with free T4 reflex FUTURE 6mo  - **Comprehensive metabolic panel FUTURE 6mo  - Albumin Random Urine Quantitative with Creat Ratio    5. Hyperlipidemia LDL goal <100    - pravastatin (PRAVACHOL) 80 MG tablet; Take 1 tablet (80 mg) by mouth daily  Dispense: 90 tablet; Refill: 3    6. Abdominal aortic aneurysm (AAA) without rupture (H)    - US Abdominal Aorta Imaging; Future    7. Hypercalcemia  Likely due to thiazide .  Workup in past normal   - **CBC with platelets FUTURE 6mo  - **Comprehensive metabolic panel FUTURE 6mo    8. Chronic gout without tophus, unspecified cause, unspecified site    - allopurinol (ZYLOPRIM) 100 MG  "tablet; TAKE 1 TABLET (100 MG) BY MOUTH 2 TIMES DAILY  Dispense: 180 tablet; Refill: 3    Patient has been advised of split billing requirements and indicates understanding: Yes  COUNSELING:  Reviewed preventive health counseling, as reflected in patient instructions    Estimated body mass index is 34.45 kg/m  as calculated from the following:    Height as of 10/30/19: 1.702 m (5' 7.01\").    Weight as of 10/30/19: 99.8 kg (220 lb).    Weight management plan: Discussed healthy diet and exercise guidelines    He reports that he quit smoking about 35 years ago. His smoking use included cigars. He has a 5.00 pack-year smoking history. He has never used smokeless tobacco.      Appropriate preventive services were discussed with this patient, including applicable screening as appropriate for cardiovascular disease, diabetes, osteopenia/osteoporosis, and glaucoma.  As appropriate for age/gender, discussed screening for colorectal cancer, prostate cancer, breast cancer, and cervical cancer. Checklist reviewing preventive services available has been given to the patient.    Reviewed patients plan of care and provided an AVS. The Basic Care Plan (routine screening as documented in Health Maintenance) for Zoey meets the Care Plan requirement. This Care Plan has been established and reviewed with the Patient.    Counseling Resources:  ATP IV Guidelines  Pooled Cohorts Equation Calculator  Breast Cancer Risk Calculator  Breast Cancer: Medication to Reduce Risk  FRAX Risk Assessment  ICSI Preventive Guidelines  Dietary Guidelines for Americans, 2010  USDA's MyPlate  ASA Prophylaxis  Lung CA Screening    Gaby Baker MD  [facility]    Identified Health Risks:    There are no Patient Instructions on file for this visit.   "

## 2020-09-17 ENCOUNTER — OFFICE VISIT (OUTPATIENT)
Dept: INTERNAL MEDICINE | Facility: CLINIC | Age: 80
End: 2020-09-17
Payer: COMMERCIAL

## 2020-09-17 VITALS
OXYGEN SATURATION: 97 % | HEIGHT: 67 IN | RESPIRATION RATE: 18 BRPM | WEIGHT: 218.6 LBS | SYSTOLIC BLOOD PRESSURE: 132 MMHG | DIASTOLIC BLOOD PRESSURE: 78 MMHG | BODY MASS INDEX: 34.31 KG/M2 | HEART RATE: 80 BPM | TEMPERATURE: 97.7 F

## 2020-09-17 DIAGNOSIS — Z00.00 MEDICARE ANNUAL WELLNESS VISIT, SUBSEQUENT: Primary | ICD-10-CM

## 2020-09-17 DIAGNOSIS — N18.2 CKD (CHRONIC KIDNEY DISEASE) STAGE 2, GFR 60-89 ML/MIN: ICD-10-CM

## 2020-09-17 DIAGNOSIS — I10 HYPERTENSION GOAL BP (BLOOD PRESSURE) < 130/80: ICD-10-CM

## 2020-09-17 DIAGNOSIS — E11.9 TYPE 2 DIABETES MELLITUS WITHOUT COMPLICATION, WITHOUT LONG-TERM CURRENT USE OF INSULIN (H): ICD-10-CM

## 2020-09-17 DIAGNOSIS — I71.40 ABDOMINAL AORTIC ANEURYSM (AAA) WITHOUT RUPTURE (H): ICD-10-CM

## 2020-09-17 DIAGNOSIS — M1A.9XX0 CHRONIC GOUT WITHOUT TOPHUS, UNSPECIFIED CAUSE, UNSPECIFIED SITE: ICD-10-CM

## 2020-09-17 DIAGNOSIS — E78.5 HYPERLIPIDEMIA LDL GOAL <100: ICD-10-CM

## 2020-09-17 DIAGNOSIS — E83.52 HYPERCALCEMIA: ICD-10-CM

## 2020-09-17 PROBLEM — E66.01 MORBID OBESITY (H): Status: RESOLVED | Noted: 2019-04-26 | Resolved: 2020-09-17

## 2020-09-17 LAB
ALBUMIN SERPL-MCNC: 3.9 G/DL (ref 3.4–5)
ALP SERPL-CCNC: 37 U/L (ref 40–150)
ALT SERPL W P-5'-P-CCNC: 19 U/L (ref 0–70)
ANION GAP SERPL CALCULATED.3IONS-SCNC: 6 MMOL/L (ref 3–14)
AST SERPL W P-5'-P-CCNC: 13 U/L (ref 0–45)
BILIRUB SERPL-MCNC: 0.5 MG/DL (ref 0.2–1.3)
BUN SERPL-MCNC: 20 MG/DL (ref 7–30)
CALCIUM SERPL-MCNC: 9.9 MG/DL (ref 8.5–10.1)
CHLORIDE SERPL-SCNC: 97 MMOL/L (ref 94–109)
CHOLEST SERPL-MCNC: 140 MG/DL
CO2 SERPL-SCNC: 29 MMOL/L (ref 20–32)
CREAT SERPL-MCNC: 0.98 MG/DL (ref 0.66–1.25)
ERYTHROCYTE [DISTWIDTH] IN BLOOD BY AUTOMATED COUNT: 12.3 % (ref 10–15)
GFR SERPL CREATININE-BSD FRML MDRD: 71 ML/MIN/{1.73_M2}
GLUCOSE SERPL-MCNC: 132 MG/DL (ref 70–99)
HBA1C MFR BLD: 5.9 % (ref 0–5.6)
HCT VFR BLD AUTO: 43.8 % (ref 40–53)
HDLC SERPL-MCNC: 57 MG/DL
HGB BLD-MCNC: 14.8 G/DL (ref 13.3–17.7)
LDLC SERPL CALC-MCNC: 66 MG/DL
MCH RBC QN AUTO: 31.8 PG (ref 26.5–33)
MCHC RBC AUTO-ENTMCNC: 33.8 G/DL (ref 31.5–36.5)
MCV RBC AUTO: 94 FL (ref 78–100)
NONHDLC SERPL-MCNC: 83 MG/DL
PLATELET # BLD AUTO: 297 10E9/L (ref 150–450)
POTASSIUM SERPL-SCNC: 3.5 MMOL/L (ref 3.4–5.3)
PROT SERPL-MCNC: 7.5 G/DL (ref 6.8–8.8)
RBC # BLD AUTO: 4.65 10E12/L (ref 4.4–5.9)
SODIUM SERPL-SCNC: 132 MMOL/L (ref 133–144)
TRIGL SERPL-MCNC: 86 MG/DL
TSH SERPL DL<=0.005 MIU/L-ACNC: 2.68 MU/L (ref 0.4–4)
WBC # BLD AUTO: 11 10E9/L (ref 4–11)

## 2020-09-17 PROCEDURE — 85027 COMPLETE CBC AUTOMATED: CPT | Performed by: INTERNAL MEDICINE

## 2020-09-17 PROCEDURE — 84443 ASSAY THYROID STIM HORMONE: CPT | Performed by: INTERNAL MEDICINE

## 2020-09-17 PROCEDURE — 83036 HEMOGLOBIN GLYCOSYLATED A1C: CPT | Performed by: INTERNAL MEDICINE

## 2020-09-17 PROCEDURE — 36415 COLL VENOUS BLD VENIPUNCTURE: CPT | Performed by: INTERNAL MEDICINE

## 2020-09-17 PROCEDURE — 99207 C FOOT EXAM  NO CHARGE: CPT | Mod: 25 | Performed by: INTERNAL MEDICINE

## 2020-09-17 PROCEDURE — 80053 COMPREHEN METABOLIC PANEL: CPT | Performed by: INTERNAL MEDICINE

## 2020-09-17 PROCEDURE — 80061 LIPID PANEL: CPT | Performed by: INTERNAL MEDICINE

## 2020-09-17 PROCEDURE — 99214 OFFICE O/P EST MOD 30 MIN: CPT | Mod: 25 | Performed by: INTERNAL MEDICINE

## 2020-09-17 PROCEDURE — 99397 PER PM REEVAL EST PAT 65+ YR: CPT | Performed by: INTERNAL MEDICINE

## 2020-09-17 RX ORDER — ALLOPURINOL 100 MG/1
TABLET ORAL
Qty: 180 TABLET | Refills: 3 | Status: SHIPPED | OUTPATIENT
Start: 2020-09-17 | End: 2021-09-07

## 2020-09-17 RX ORDER — PRAVASTATIN SODIUM 80 MG/1
80 TABLET ORAL DAILY
Qty: 90 TABLET | Refills: 3 | Status: SHIPPED | OUTPATIENT
Start: 2020-09-17 | End: 2021-09-07

## 2020-09-17 RX ORDER — CHLORTHALIDONE 25 MG/1
25 TABLET ORAL DAILY
Qty: 90 TABLET | Refills: 4 | Status: SHIPPED | OUTPATIENT
Start: 2020-09-17 | End: 2021-09-07

## 2020-09-17 RX ORDER — LISINOPRIL 40 MG/1
40 TABLET ORAL DAILY
Qty: 90 TABLET | Refills: 4 | Status: SHIPPED | OUTPATIENT
Start: 2020-09-17 | End: 2021-09-07

## 2020-09-17 RX ORDER — CYANOCOBALAMIN (VITAMIN B-12) 500 MCG
400 LOZENGE ORAL DAILY
COMMUNITY
End: 2022-10-31

## 2020-09-17 ASSESSMENT — MIFFLIN-ST. JEOR: SCORE: 1654.06

## 2020-09-18 NOTE — RESULT ENCOUNTER NOTE
Minor decrease in sodium.  Normal electrolytes. Normal kidney function. Normal liver blood test. Normal thyroid. Good cholesterol. Normal blood count.

## 2020-09-24 ENCOUNTER — MYC MEDICAL ADVICE (OUTPATIENT)
Dept: INTERNAL MEDICINE | Facility: CLINIC | Age: 80
End: 2020-09-24

## 2020-09-24 DIAGNOSIS — E11.9 TYPE 2 DIABETES MELLITUS WITHOUT COMPLICATION, WITHOUT LONG-TERM CURRENT USE OF INSULIN (H): ICD-10-CM

## 2020-10-01 ENCOUNTER — ANCILLARY PROCEDURE (OUTPATIENT)
Dept: ULTRASOUND IMAGING | Facility: CLINIC | Age: 80
End: 2020-10-01
Attending: INTERNAL MEDICINE
Payer: COMMERCIAL

## 2020-10-01 DIAGNOSIS — I71.40 ABDOMINAL AORTIC ANEURYSM (AAA) WITHOUT RUPTURE (H): ICD-10-CM

## 2020-10-01 PROCEDURE — 76775 US EXAM ABDO BACK WALL LIM: CPT | Performed by: RADIOLOGY

## 2020-10-06 ENCOUNTER — TELEPHONE (OUTPATIENT)
Dept: FAMILY MEDICINE | Facility: CLINIC | Age: 80
End: 2020-10-06

## 2020-10-06 DIAGNOSIS — E11.21 TYPE 2 DIABETES MELLITUS WITH DIABETIC NEPHROPATHY, WITHOUT LONG-TERM CURRENT USE OF INSULIN (H): Primary | ICD-10-CM

## 2020-10-06 RX ORDER — BLOOD-GLUCOSE METER
EACH MISCELLANEOUS
Qty: 1 KIT | Refills: 0 | Status: SHIPPED | OUTPATIENT
Start: 2020-10-06 | End: 2023-05-16

## 2020-10-06 NOTE — TELEPHONE ENCOUNTER
Fax from pharmacy request new prescription for One touch test strip, meter and lancets. Contour is no longer covered by insurance.   Neelam Carter MA

## 2020-12-06 ENCOUNTER — HEALTH MAINTENANCE LETTER (OUTPATIENT)
Age: 80
End: 2020-12-06

## 2021-02-16 ENCOUNTER — IMMUNIZATION (OUTPATIENT)
Dept: NURSING | Facility: CLINIC | Age: 81
End: 2021-02-16
Payer: COMMERCIAL

## 2021-02-16 PROCEDURE — 0001A PR COVID VAC PFIZER DIL RECON 30 MCG/0.3 ML IM: CPT

## 2021-02-16 PROCEDURE — 91300 PR COVID VAC PFIZER DIL RECON 30 MCG/0.3 ML IM: CPT

## 2021-03-01 NOTE — PROGRESS NOTES
Assessment & Plan     Type 2 diabetes mellitus without complication, without long-term current use of insulin (H)  He didn't do well on once daily dosing as his blood sugars went too high.  Continue on twice daily dosing.   - metFORMIN (GLUCOPHAGE) 500 MG tablet; Take 1 tablet (500 mg) by mouth 2 times daily (with meals)    Abdominal aortic aneurysm (AAA) without rupture (H)  Imaging in the fall    HTN, goal below 130/80  Well controlled with medications without side effects.   - Basic metabolic panel  - diltiazem ER COATED BEADS (CARDIZEM LA) 240 MG 24 hr tablet; Take 1 tablet (240 mg) by mouth daily  - CK total    Encounter for therapeutic drug monitoring      Dermatitis    - ammonium lactate (LAC-HYDRIN) 12 % external cream; Apply topically 2 times daily as needed for dry skin  - mometasone (ELOCON) 0.1 % external cream; Apply sparingly to affected area twice daily as needed.  Do not apply to face.      33 minutes spent on the date of the encounter doing chart review, history and exam, documentation and further activities as noted above       Patient Instructions   Keep your metformin at 500 mg twice daily.    It is fine to take Pedialyte before and after your vaccine.  It might make your blood sugar go up for a day or two.     Return in about 6 months (around 9/3/2021) for Diabetic check with Dr. Mcmanus.    Gaby Baker MD  [facility] is a 80 year old who presents for the following health issues     HPI   He is down to 1 tab of metformin per day.  He was worried about this and went down to 500 mg twice daily instead of bid.  He went down to 500 mg in the morning and then the blood sugar went over 200.    Diabetes Follow-up    How often are you checking your blood sugar? A few times a week  What time of day are you checking your blood sugars (select all that apply)?  Before and after meals  Have you had any blood sugars above 200?  No  Have you had any blood  sugars below 70?  No    What symptoms do you notice when your blood sugar is low?  None    What concerns do you have today about your diabetes? None     Do you have any of these symptoms? (Select all that apply)  No numbness or tingling in feet.  No redness, sores or blisters on feet.  No complaints of excessive thirst.  No reports of blurry vision.  No significant changes to weight.    Have you had a diabetic eye exam in the last 12 months? No        BP Readings from Last 2 Encounters:   03/03/21 138/66   09/17/20 132/78     Hemoglobin A1C (%)   Date Value   03/03/2021 6.6 (H)   09/17/2020 5.9 (H)     LDL Cholesterol Calculated (mg/dL)   Date Value   09/17/2020 66   04/26/2019 63           How many servings of fruits and vegetables do you eat daily?  2-3    On average, how many sweetened beverages do you drink each day (Examples: soda, juice, sweet tea, etc.  Do NOT count diet or artificially sweetened beverages)?   0    How many days per week do you exercise enough to make your heart beat faster? 3 or less    How many minutes a day do you exercise enough to make your heart beat faster? 9 or less    How many days per week do you miss taking your medication? 0        Review of Systems    ROS: 10 point ROS neg other than the symptoms noted above in the HPI.       Objective    /66   Pulse 71   Temp 97.9  F (36.6  C) (Oral)   Wt 100.2 kg (221 lb)   SpO2 98%   BMI 34.69 kg/m    Body mass index is 34.69 kg/m .  Physical Exam   GENERAL APPEARANCE: healthy, alert and no distress  NECK: no adenopathy, no asymmetry, masses, or scars and thyroid normal to palpation  RESP: lungs clear to auscultation - no rales, rhonchi or wheezes  CV: regular rates and rhythm and normal S1 S2, no S3 or S4  ABDOMEN:  soft, nontender, no HSM or masses and bowel sounds normal  SKIN: no suspicious lesions or rashes  PSYCH: mentation appears normal. and affect normal/bright  No edema   Sensory exam of the foot is normal, tested with the  monofilament. Good pulses, no lesions or ulcers, good peripheral pulses.     Results for orders placed or performed in visit on 03/03/21 (from the past 24 hour(s))   Hemoglobin A1c   Result Value Ref Range    Hemoglobin A1C 6.6 (H) 0 - 5.6 %

## 2021-03-03 ENCOUNTER — OFFICE VISIT (OUTPATIENT)
Dept: INTERNAL MEDICINE | Facility: CLINIC | Age: 81
End: 2021-03-03
Payer: COMMERCIAL

## 2021-03-03 VITALS
SYSTOLIC BLOOD PRESSURE: 138 MMHG | DIASTOLIC BLOOD PRESSURE: 66 MMHG | WEIGHT: 221 LBS | OXYGEN SATURATION: 98 % | HEART RATE: 71 BPM | TEMPERATURE: 97.9 F | BODY MASS INDEX: 34.69 KG/M2

## 2021-03-03 DIAGNOSIS — Z51.81 ENCOUNTER FOR THERAPEUTIC DRUG MONITORING: ICD-10-CM

## 2021-03-03 DIAGNOSIS — E11.9 TYPE 2 DIABETES MELLITUS WITHOUT COMPLICATION, WITHOUT LONG-TERM CURRENT USE OF INSULIN (H): Primary | ICD-10-CM

## 2021-03-03 DIAGNOSIS — I10 HTN, GOAL BELOW 130/80: ICD-10-CM

## 2021-03-03 DIAGNOSIS — I71.40 ABDOMINAL AORTIC ANEURYSM (AAA) WITHOUT RUPTURE (H): ICD-10-CM

## 2021-03-03 DIAGNOSIS — L30.9 DERMATITIS: ICD-10-CM

## 2021-03-03 DIAGNOSIS — E83.52 HYPERCALCEMIA: ICD-10-CM

## 2021-03-03 LAB
ANION GAP SERPL CALCULATED.3IONS-SCNC: 4 MMOL/L (ref 3–14)
BUN SERPL-MCNC: 27 MG/DL (ref 7–30)
CALCIUM SERPL-MCNC: 10.8 MG/DL (ref 8.5–10.1)
CHLORIDE SERPL-SCNC: 98 MMOL/L (ref 94–109)
CK SERPL-CCNC: 99 U/L (ref 30–300)
CO2 SERPL-SCNC: 31 MMOL/L (ref 20–32)
CREAT SERPL-MCNC: 1.1 MG/DL (ref 0.66–1.25)
GFR SERPL CREATININE-BSD FRML MDRD: 62 ML/MIN/{1.73_M2}
GLUCOSE SERPL-MCNC: 146 MG/DL (ref 70–99)
HBA1C MFR BLD: 6.6 % (ref 0–5.6)
POTASSIUM SERPL-SCNC: 3.6 MMOL/L (ref 3.4–5.3)
SODIUM SERPL-SCNC: 133 MMOL/L (ref 133–144)

## 2021-03-03 PROCEDURE — 36415 COLL VENOUS BLD VENIPUNCTURE: CPT | Performed by: INTERNAL MEDICINE

## 2021-03-03 PROCEDURE — 82550 ASSAY OF CK (CPK): CPT | Performed by: INTERNAL MEDICINE

## 2021-03-03 PROCEDURE — 99214 OFFICE O/P EST MOD 30 MIN: CPT | Performed by: INTERNAL MEDICINE

## 2021-03-03 PROCEDURE — 80048 BASIC METABOLIC PNL TOTAL CA: CPT | Performed by: INTERNAL MEDICINE

## 2021-03-03 PROCEDURE — 83036 HEMOGLOBIN GLYCOSYLATED A1C: CPT | Performed by: INTERNAL MEDICINE

## 2021-03-03 RX ORDER — AMMONIUM LACTATE 12 G/100G
CREAM TOPICAL 2 TIMES DAILY PRN
Qty: 385 G | Refills: 3 | Status: SHIPPED | OUTPATIENT
Start: 2021-03-03

## 2021-03-03 RX ORDER — MOMETASONE FUROATE 1 MG/G
CREAM TOPICAL
Qty: 45 G | Refills: 3 | Status: SHIPPED | OUTPATIENT
Start: 2021-03-03 | End: 2022-07-07

## 2021-03-03 NOTE — PATIENT INSTRUCTIONS
Keep your metformin at 500 mg twice daily.    It is fine to take Pedialyte before and after your vaccine.  It might make your blood sugar go up for a day or two.

## 2021-03-04 NOTE — RESULT ENCOUNTER NOTE
Normal muscle test. Normal electrolytes, other than your calcium level. Normal kidney function. I suspect the calcium level is due to the chlorthalidone as this was felt to be the culprit when it was worked up years ago.  However, the calcium level is increasing so further lab testing is needed.  Please schedule a lab appointment for further investigation and a follow up telephone visit to go over the results.     Dr. Baker

## 2021-03-09 ENCOUNTER — IMMUNIZATION (OUTPATIENT)
Dept: NURSING | Facility: CLINIC | Age: 81
End: 2021-03-09
Attending: FAMILY MEDICINE
Payer: COMMERCIAL

## 2021-03-09 PROCEDURE — 91300 PR COVID VAC PFIZER DIL RECON 30 MCG/0.3 ML IM: CPT

## 2021-03-09 PROCEDURE — 0002A PR COVID VAC PFIZER DIL RECON 30 MCG/0.3 ML IM: CPT

## 2021-03-10 DIAGNOSIS — E83.52 HYPERCALCEMIA: ICD-10-CM

## 2021-03-10 LAB
CA-I SERPL ISE-MCNC: 5.4 MG/DL (ref 4.4–5.2)
PTH-INTACT SERPL-MCNC: 44 PG/ML (ref 18–80)

## 2021-03-10 PROCEDURE — 86334 IMMUNOFIX E-PHORESIS SERUM: CPT | Performed by: PATHOLOGY

## 2021-03-10 PROCEDURE — 84165 PROTEIN E-PHORESIS SERUM: CPT | Performed by: PATHOLOGY

## 2021-03-10 PROCEDURE — 99N1036 PR STATISTIC TOTAL PROTEIN: Performed by: INTERNAL MEDICINE

## 2021-03-10 PROCEDURE — 86335 IMMUNFIX E-PHORSIS/URINE/CSF: CPT | Performed by: PATHOLOGY

## 2021-03-10 PROCEDURE — 83970 ASSAY OF PARATHORMONE: CPT | Performed by: INTERNAL MEDICINE

## 2021-03-10 PROCEDURE — 82784 ASSAY IGA/IGD/IGG/IGM EACH: CPT | Performed by: INTERNAL MEDICINE

## 2021-03-10 PROCEDURE — 82306 VITAMIN D 25 HYDROXY: CPT | Performed by: INTERNAL MEDICINE

## 2021-03-10 PROCEDURE — 82330 ASSAY OF CALCIUM: CPT | Performed by: INTERNAL MEDICINE

## 2021-03-10 PROCEDURE — 84166 PROTEIN E-PHORESIS/URINE/CSF: CPT | Performed by: PATHOLOGY

## 2021-03-10 PROCEDURE — 36415 COLL VENOUS BLD VENIPUNCTURE: CPT | Performed by: INTERNAL MEDICINE

## 2021-03-11 LAB
ALBUMIN SERPL ELPH-MCNC: 4.4 G/DL (ref 3.7–5.1)
ALPHA1 GLOB SERPL ELPH-MCNC: 0.3 G/DL (ref 0.2–0.4)
ALPHA2 GLOB SERPL ELPH-MCNC: 0.8 G/DL (ref 0.5–0.9)
B-GLOBULIN SERPL ELPH-MCNC: 0.9 G/DL (ref 0.6–1)
DEPRECATED CALCIDIOL+CALCIFEROL SERPL-MC: 34 UG/L (ref 20–75)
GAMMA GLOB SERPL ELPH-MCNC: 0.9 G/DL (ref 0.7–1.6)
IGA SERPL-MCNC: 232 MG/DL (ref 84–499)
IGG SERPL-MCNC: 1041 MG/DL (ref 610–1616)
IGM SERPL-MCNC: 41 MG/DL (ref 35–242)
M PROTEIN SERPL ELPH-MCNC: 0.1 G/DL
PROT PATTERN SERPL ELPH-IMP: ABNORMAL
PROT PATTERN SERPL IFE-IMP: NORMAL

## 2021-03-12 LAB
PROT ELPH PNL UR ELPH: NORMAL
PROT PATTERN UR ELPH-IMP: NORMAL

## 2021-03-15 NOTE — RESULT ENCOUNTER NOTE
Zoey Romo    These results reveal some mild protein abnormalities which are benign at this time and do not need to be addressed.  I will certainly review this with you at our upcoming visit in September as scheduled.  I look forward to seeing you then.     Sincerely,       ABELARDO OREILLY M.D.

## 2021-05-17 ENCOUNTER — TRANSFERRED RECORDS (OUTPATIENT)
Dept: HEALTH INFORMATION MANAGEMENT | Facility: CLINIC | Age: 81
End: 2021-05-17

## 2021-08-19 ENCOUNTER — MYC MEDICAL ADVICE (OUTPATIENT)
Dept: INTERNAL MEDICINE | Facility: CLINIC | Age: 81
End: 2021-08-19

## 2021-08-19 DIAGNOSIS — I10 HTN, GOAL BELOW 130/80: ICD-10-CM

## 2021-08-19 DIAGNOSIS — E78.5 HYPERLIPIDEMIA LDL GOAL <100: ICD-10-CM

## 2021-08-19 DIAGNOSIS — N18.2 CKD (CHRONIC KIDNEY DISEASE) STAGE 2, GFR 60-89 ML/MIN: ICD-10-CM

## 2021-08-19 DIAGNOSIS — E11.9 TYPE 2 DIABETES MELLITUS WITHOUT COMPLICATION, WITHOUT LONG-TERM CURRENT USE OF INSULIN (H): Primary | ICD-10-CM

## 2021-08-19 NOTE — TELEPHONE ENCOUNTER
Rn fwd pt request for full blood work-up orders   Pt is scheduled for lab 9/1/21 and in-person visit 9/7/21 at 8:00am    Mindi Ramirez RN on 8/19/2021 at 12:14 PM

## 2021-09-01 ENCOUNTER — LAB (OUTPATIENT)
Dept: LAB | Facility: CLINIC | Age: 81
End: 2021-09-01
Payer: COMMERCIAL

## 2021-09-01 DIAGNOSIS — E78.5 HYPERLIPIDEMIA LDL GOAL <100: ICD-10-CM

## 2021-09-01 DIAGNOSIS — I10 HTN, GOAL BELOW 130/80: ICD-10-CM

## 2021-09-01 DIAGNOSIS — E11.9 TYPE 2 DIABETES MELLITUS WITHOUT COMPLICATION, WITHOUT LONG-TERM CURRENT USE OF INSULIN (H): ICD-10-CM

## 2021-09-01 DIAGNOSIS — N18.2 CKD (CHRONIC KIDNEY DISEASE) STAGE 2, GFR 60-89 ML/MIN: ICD-10-CM

## 2021-09-01 LAB
ANION GAP SERPL CALCULATED.3IONS-SCNC: 8 MMOL/L (ref 3–14)
BUN SERPL-MCNC: 20 MG/DL (ref 7–30)
CALCIUM SERPL-MCNC: 10.2 MG/DL (ref 8.5–10.1)
CHLORIDE BLD-SCNC: 100 MMOL/L (ref 94–109)
CHOLEST SERPL-MCNC: 154 MG/DL
CO2 SERPL-SCNC: 26 MMOL/L (ref 20–32)
CREAT SERPL-MCNC: 1.09 MG/DL (ref 0.66–1.25)
CREAT UR-MCNC: 185 MG/DL
FASTING STATUS PATIENT QL REPORTED: YES
GFR SERPL CREATININE-BSD FRML MDRD: 63 ML/MIN/1.73M2
GLUCOSE BLD-MCNC: 133 MG/DL (ref 70–99)
HBA1C MFR BLD: 6.3 % (ref 0–5.6)
HDLC SERPL-MCNC: 65 MG/DL
LDLC SERPL CALC-MCNC: 70 MG/DL
MICROALBUMIN UR-MCNC: 555 MG/L
MICROALBUMIN/CREAT UR: 300 MG/G CR (ref 0–17)
NONHDLC SERPL-MCNC: 89 MG/DL
POTASSIUM BLD-SCNC: 3.6 MMOL/L (ref 3.4–5.3)
SODIUM SERPL-SCNC: 134 MMOL/L (ref 133–144)
TRIGL SERPL-MCNC: 95 MG/DL

## 2021-09-01 PROCEDURE — 36415 COLL VENOUS BLD VENIPUNCTURE: CPT

## 2021-09-01 PROCEDURE — 83036 HEMOGLOBIN GLYCOSYLATED A1C: CPT

## 2021-09-01 PROCEDURE — 80061 LIPID PANEL: CPT

## 2021-09-01 PROCEDURE — 82043 UR ALBUMIN QUANTITATIVE: CPT

## 2021-09-01 PROCEDURE — 80048 BASIC METABOLIC PNL TOTAL CA: CPT

## 2021-09-07 ENCOUNTER — OFFICE VISIT (OUTPATIENT)
Dept: INTERNAL MEDICINE | Facility: CLINIC | Age: 81
End: 2021-09-07
Payer: COMMERCIAL

## 2021-09-07 VITALS
HEART RATE: 68 BPM | BODY MASS INDEX: 34.22 KG/M2 | OXYGEN SATURATION: 96 % | TEMPERATURE: 97.5 F | SYSTOLIC BLOOD PRESSURE: 137 MMHG | WEIGHT: 218 LBS | DIASTOLIC BLOOD PRESSURE: 83 MMHG

## 2021-09-07 DIAGNOSIS — E11.22 TYPE 2 DIABETES MELLITUS WITH STAGE 3A CHRONIC KIDNEY DISEASE, WITHOUT LONG-TERM CURRENT USE OF INSULIN (H): Primary | ICD-10-CM

## 2021-09-07 DIAGNOSIS — Z12.12 SCREENING FOR COLORECTAL CANCER: ICD-10-CM

## 2021-09-07 DIAGNOSIS — M1A.9XX0 CHRONIC GOUT WITHOUT TOPHUS, UNSPECIFIED CAUSE, UNSPECIFIED SITE: ICD-10-CM

## 2021-09-07 DIAGNOSIS — I10 HYPERTENSION GOAL BP (BLOOD PRESSURE) < 130/80: ICD-10-CM

## 2021-09-07 DIAGNOSIS — N18.31 TYPE 2 DIABETES MELLITUS WITH STAGE 3A CHRONIC KIDNEY DISEASE, WITHOUT LONG-TERM CURRENT USE OF INSULIN (H): Primary | ICD-10-CM

## 2021-09-07 DIAGNOSIS — I71.40 ABDOMINAL AORTIC ANEURYSM (AAA) WITHOUT RUPTURE (H): ICD-10-CM

## 2021-09-07 DIAGNOSIS — Z12.11 SCREEN FOR COLON CANCER: ICD-10-CM

## 2021-09-07 DIAGNOSIS — Z12.11 SCREENING FOR COLORECTAL CANCER: ICD-10-CM

## 2021-09-07 DIAGNOSIS — N18.2 CKD (CHRONIC KIDNEY DISEASE) STAGE 2, GFR 60-89 ML/MIN: ICD-10-CM

## 2021-09-07 DIAGNOSIS — G47.33 OSA (OBSTRUCTIVE SLEEP APNEA): ICD-10-CM

## 2021-09-07 DIAGNOSIS — I10 HTN, GOAL BELOW 130/80: ICD-10-CM

## 2021-09-07 DIAGNOSIS — E21.0 PRIMARY HYPERPARATHYROIDISM (H): ICD-10-CM

## 2021-09-07 DIAGNOSIS — E78.5 HYPERLIPIDEMIA LDL GOAL <100: ICD-10-CM

## 2021-09-07 PROCEDURE — 99214 OFFICE O/P EST MOD 30 MIN: CPT | Performed by: INTERNAL MEDICINE

## 2021-09-07 RX ORDER — MULTIVIT WITH MINERALS/LUTEIN
250 TABLET ORAL DAILY
COMMUNITY
End: 2022-10-31

## 2021-09-07 RX ORDER — VITAMIN B COMPLEX
25 TABLET ORAL DAILY
COMMUNITY
End: 2024-08-07

## 2021-09-07 RX ORDER — LISINOPRIL 40 MG/1
40 TABLET ORAL DAILY
Qty: 90 TABLET | Refills: 4 | Status: SHIPPED | OUTPATIENT
Start: 2021-09-07 | End: 2022-10-01

## 2021-09-07 RX ORDER — CHLORTHALIDONE 25 MG/1
25 TABLET ORAL DAILY
Qty: 90 TABLET | Refills: 4 | Status: SHIPPED | OUTPATIENT
Start: 2021-09-07 | End: 2022-09-09

## 2021-09-07 RX ORDER — PRAVASTATIN SODIUM 80 MG/1
80 TABLET ORAL DAILY
Qty: 90 TABLET | Refills: 3 | Status: SHIPPED | OUTPATIENT
Start: 2021-09-07 | End: 2022-10-31

## 2021-09-07 RX ORDER — ALLOPURINOL 100 MG/1
TABLET ORAL
Qty: 180 TABLET | Refills: 3 | Status: SHIPPED | OUTPATIENT
Start: 2021-09-07 | End: 2022-10-31

## 2021-09-07 NOTE — PATIENT INSTRUCTIONS
Call to schedule imaging   :    Abdominal ultrasound and DEXA (bone density)    COlonoscopy this year:   [facility] [place]   [address] [address]   Phone: [phone]     Return to clinic 6 months 3/2021 for annual exam//diabetes recheck.

## 2021-09-07 NOTE — PROGRESS NOTES
"    Assessment & Plan     Type 2 diabetes mellitus with stage 3a chronic kidney disease, without long-term current use of insulin (H)  Well controlled.   - lisinopril (ZESTRIL) 40 MG tablet; Take 1 tablet (40 mg) by mouth daily    HTN, goal below 130/80  Well controlled.   - chlorthalidone (HYGROTON) 25 MG tablet; Take 1 tablet (25 mg) by mouth daily    Abdominal aortic aneurysm (AAA) without rupture (H)   due for re imaging   - US Abdominal Aorta Imaging; Future    CKD (chronic kidney disease) stage 2, GFR 60-89 ml/min      Hyperlipidemia LDL goal <100   continue current management   - pravastatin (PRAVACHOL) 80 MG tablet; Take 1 tablet (80 mg) by mouth daily    NORMA (obstructive sleep apnea)  Treated     Primary hyperparathyroidism (H)  Per recent labs:  Hypercalcemia with normal (inappropriately normal) PTH level.   - DX Hip/Pelvis/Spine; Future    Screen for colon cancer   he is agreeable to rescreen.  Polyps 5Y ago.     Chronic gout without tophus, unspecified cause, unspecified site     - allopurinol (ZYLOPRIM) 100 MG tablet; TAKE 1 TABLET (100 MG) BY MOUTH 2 TIMES DAILY    Hypertension goal BP (blood pressure) < 130/80     - lisinopril (ZESTRIL) 40 MG tablet; Take 1 tablet (40 mg) by mouth daily    Screening for colorectal cancer   agreeable to continue screening.    - Adult Gastro Ref - Procedure Only; Future      I spent a total of 30 minutes on the day of the visit.   Time spent doing chart review, history and exam, documentation and further activities per the note        BMI:   Estimated body mass index is 34.22 kg/m  as calculated from the following:    Height as of 9/17/20: 1.7 m (5' 6.93\").    Weight as of this encounter: 98.9 kg (218 lb).            Return in about 6 months (around 3/7/2022) for Physical Exam, diabetes follow up.    Kia Mcmanus MD  [facility] " [place]    ================================================  ==================================================    Subjective   Zoey is a  81 year old who presents for the following health issues   He lives with wife in home, 3 kids, all in [place].        HPI     80 y/o M here for DM f/u.  H/o DMII (A1C is 6.3), NORMA, HTN, AAA (yearly US in Feb), Primary Hyperparathyroidism           Diabetes Follow-up    How often are you checking your blood sugar? one times a week  What time of day are you checking your blood sugars (select all that apply)?  Before meals  Have you had any blood sugars above 200?  No  Have you had any blood sugars below 70?  No    What symptoms do you notice when your blood sugar is low?  None    What concerns do you have today about your diabetes? None     Do you have any of these symptoms? (Select all that apply)  Numbness in feet    Have you had a diabetic eye exam in the last 12 months? No        BP Readings from Last 2 Encounters:   09/07/21 137/83   03/03/21 138/66     Hemoglobin A1C (%)   Date Value   09/01/2021 6.3 (H)   03/03/2021 6.6 (H)   09/17/2020 5.9 (H)     LDL Cholesterol Calculated (mg/dL)   Date Value   09/01/2021 70   09/17/2020 66   04/26/2019 63            How many servings of fruits and vegetables do you eat daily?  2-3    On average, how many sweetened beverages do you drink each day (Examples: soda, juice, sweet tea, etc.  Do NOT count diet or artificially sweetened beverages)?   0    How many days per week do you exercise enough to make your heart beat faster? 3 or less    How many minutes a day do you exercise enough to make your heart beat faster? 9 or less  How many days per week do you miss taking your medication? seldom    What makes it hard for you to take your medications?  remembering to take    New Patient/Transfer of Care    Review of Systems   Constitutional, HEENT, cardiovascular, pulmonary, gi and gu systems are negative, except as otherwise noted.       Objective    /83 (BP Location: Right arm, Patient Position: Sitting, Cuff Size: Adult Regular)   Pulse 68   Temp 97.5  F (36.4  C) (Oral)   Wt 98.9 kg (218 lb)   SpO2 96%   BMI 34.22 kg/m    Body mass index is 34.22 kg/m .'    Physical Exam     GENERAL: healthy, alert and no distress  EYES: Eyes grossly normal to inspection, PERRL and conjunctivae and sclerae normal  RESP: lungs clear to auscultation - no rales, rhonchi or wheezes  CV: regular rate and rhythm, normal S1 S2, no S3 or S4, no murmur, click or rub, no peripheral edema and peripheral pulses strong  MS: no gross musculoskeletal defects noted, no edema  PSYCH: mentation appears normal, affect normal/bright  Diabetic foot exam: normal DP and PT pulses, no trophic changes or ulcerative lesions and slightly diminished sensory exam @ distal toes.     Reviewed 9/1/2021 labs: Look great. Patient has no questions.

## 2021-09-15 ENCOUNTER — ANCILLARY PROCEDURE (OUTPATIENT)
Dept: BONE DENSITY | Facility: CLINIC | Age: 81
End: 2021-09-15
Attending: INTERNAL MEDICINE
Payer: COMMERCIAL

## 2021-09-15 ENCOUNTER — ANCILLARY PROCEDURE (OUTPATIENT)
Dept: ULTRASOUND IMAGING | Facility: CLINIC | Age: 81
End: 2021-09-15
Attending: INTERNAL MEDICINE
Payer: COMMERCIAL

## 2021-09-15 DIAGNOSIS — E21.3 HYPERPARATHYROIDISM (H): ICD-10-CM

## 2021-09-15 DIAGNOSIS — Z91.199 FAILURE TO ATTEND APPOINTMENT: Primary | ICD-10-CM

## 2021-09-15 DIAGNOSIS — E21.0 PRIMARY HYPERPARATHYROIDISM (H): ICD-10-CM

## 2021-09-15 DIAGNOSIS — I71.40 ABDOMINAL AORTIC ANEURYSM (AAA) WITHOUT RUPTURE (H): ICD-10-CM

## 2021-09-15 PROCEDURE — 77081 DXA BONE DENSITY APPENDICULR: CPT | Performed by: INTERNAL MEDICINE

## 2021-09-15 PROCEDURE — 77080 DXA BONE DENSITY AXIAL: CPT | Mod: XU | Performed by: INTERNAL MEDICINE

## 2021-09-15 PROCEDURE — 76775 US EXAM ABDO BACK WALL LIM: CPT | Performed by: RADIOLOGY

## 2021-09-15 NOTE — RESULT ENCOUNTER NOTE
Zoey,    Your aortic aneurysm is stable. This can safely be followed yearly.     Francisca Bautista MD

## 2021-09-17 NOTE — RESULT ENCOUNTER NOTE
Dear Zoey,    Your recent test results are attached.      Normal bone density.    If you have any questions please feel free to contact [phone] or myself via [employer].    Sincerely,  Mia Denise, CNP

## 2021-09-20 NOTE — RESULT ENCOUNTER NOTE
Dear Zoey,    Your recent test results are attached.      Normal bone density.    If you have any questions please feel free to contact [phone] or myself via [employer].    Sincerely,  Mia Denise, CNP

## 2021-10-28 ENCOUNTER — OFFICE VISIT (OUTPATIENT)
Dept: FAMILY MEDICINE | Facility: CLINIC | Age: 81
End: 2021-10-28
Payer: COMMERCIAL

## 2021-10-28 VITALS
BODY MASS INDEX: 31.5 KG/M2 | WEIGHT: 220 LBS | SYSTOLIC BLOOD PRESSURE: 136 MMHG | TEMPERATURE: 97.8 F | OXYGEN SATURATION: 97 % | HEART RATE: 70 BPM | HEIGHT: 70 IN | DIASTOLIC BLOOD PRESSURE: 70 MMHG

## 2021-10-28 DIAGNOSIS — Z86.0100 HISTORY OF COLONIC POLYPS: ICD-10-CM

## 2021-10-28 DIAGNOSIS — Z12.11 SCREEN FOR COLON CANCER: ICD-10-CM

## 2021-10-28 DIAGNOSIS — Z01.818 PREOP GENERAL PHYSICAL EXAM: Primary | ICD-10-CM

## 2021-10-28 DIAGNOSIS — I44.30 AV BLOCK: ICD-10-CM

## 2021-10-28 PROCEDURE — 99214 OFFICE O/P EST MOD 30 MIN: CPT | Performed by: PHYSICIAN ASSISTANT

## 2021-10-28 PROCEDURE — 93000 ELECTROCARDIOGRAM COMPLETE: CPT | Performed by: PHYSICIAN ASSISTANT

## 2021-10-28 ASSESSMENT — MIFFLIN-ST. JEOR: SCORE: 1703.05

## 2021-10-28 NOTE — PROGRESS NOTES
[facility]  [address]  [place] [address]  Phone: [phone]  Primary Provider: Kia Mcmanus  Pre-op Performing Provider: AVA PULIDO      PREOPERATIVE EVALUATION:  Today's date: 10/28/2021    Zoey Romo is a 81 year old male who presents for a preoperative evaluation.    Surgical Information:  Surgery/Procedure: Colonoscopy  Surgery Location: [facility]  Surgeon: Dr. Brendan Lemos  Surgery Date: 11/03/2021  Time of Surgery: to be determined   Where patient plans to recover: At home with family      Type of Anesthesia Anticipated: to be determined    Assessment & Plan     The proposed surgical procedure is considered LOW risk.    1. Preop general physical exam    2. Screen for colon cancer    3. History of colonic polyps        RECOMMENDATION:  APPROVAL GIVEN to proceed with proposed procedure, without further diagnostic evaluation.        Subjective     HPI related to upcoming procedure: h/o polyps noted.       Preop Questions 10/28/2021   1. Have you ever had a heart attack or stroke? No   2. Have you ever had surgery on your heart or blood vessels, such as a stent placement, a coronary artery bypass, or surgery on an artery in your head, neck, heart, or legs? No   3. Do you have chest pain with activity? No   4. Do you have a history of  heart failure? No   5. Do you currently have a cold, bronchitis or symptoms of other infection? No   6. Do you have a cough, shortness of breath, or wheezing? No   7. Do you or anyone in your family have previous history of blood clots? No   8. Do you or does anyone in your family have a serious bleeding problem such as prolonged bleeding following surgeries or cuts? No   9. Have you ever had problems with anemia or been told to take iron pills? No   10. Have you had any abnormal blood loss such as black, tarry or bloody stools? No   11. Have you ever had a blood transfusion? No   12. Are you willing to have a  blood transfusion if it is medically needed before, during, or after your surgery? Yes   13. Have you or any of your relatives ever had problems with anesthesia? No   14. Do you have sleep apnea, excessive snoring or daytime drowsiness? No   15. Do you have any artifical heart valves or other implanted medical devices like a pacemaker, defibrillator, or continuous glucose monitor? No   16. Do you have artificial joints? No   17. Are you allergic to latex? No     Health Care Directive:  Patient has a Health Care Directive on file      Preoperative Review of :   reviewed - no record of controlled substances prescribed.          Review of Systems  CONSTITUTIONAL: NEGATIVE for fever, chills, change in weight  ENT/MOUTH: NEGATIVE for ear, mouth and throat problems  RESP: NEGATIVE for significant cough or SOB  CV: NEGATIVE for chest pain, palpitations or peripheral edema    Patient Active Problem List    Diagnosis Date Noted     AV block 10/28/2021     Priority: Medium     Primary hyperparathyroidism (H) 09/06/2021     Priority: Medium     History of basal cell cancer 04/26/2019     Priority: Medium     Multiple nevi 04/19/2018     Priority: Medium     Type 2 diabetes mellitus without complication, without long-term current use of insulin (H) 03/21/2017     Priority: Medium     Gastroesophageal reflux disease without esophagitis 01/15/2016     Priority: Medium     Chronic gout without tophus, unspecified cause, unspecified site 01/15/2016     Priority: Medium     Abdominal aortic aneurysm without rupture (H) 01/15/2016     Priority: Medium     Hypertension goal BP (blood pressure) < 130/80 01/15/2016     Priority: Medium     Type 2 diabetes mellitus with stage 3a chronic kidney disease, without long-term current use of insulin (H) 07/28/2015     Priority: Medium     Hypercalcemia 03/13/2015     Priority: Medium     From chlorthalidone.       CKD (chronic kidney disease) stage 2, GFR 60-89 ml/min 10/01/2014      Priority: Medium     Eczema 11/22/2013     Priority: Medium     Fusion of spine 04/29/2013     Priority: Medium     Lumbar stenosis 03/06/2013     Priority: Medium     Spondylolisthesis of lumbar region 03/06/2013     Priority: Medium     Gout 01/27/2012     Priority: Medium     NORMA (obstructive sleep apnea) 03/18/2011     Priority: Medium     Uses cpap       HYPERLIPIDEMIA LDL GOAL <100 10/31/2010     Priority: Medium     GERD (gastroesophageal reflux disease)      Priority: Medium     Cataract OU 05/17/2012     Priority: Low              Advanced directives, counseling/discussion 08/30/2011     Priority: Low     Advance Directive Problem List Overview:   Name Relationship Phone    Primary Health Care Agent            Alternative Health Care Agent          Discussed advance care planning with patient; information given to patient to review. 8/30/2011          Dermatochalasis OU 03/16/2011     Priority: Low      Past Medical History:   Diagnosis Date     AAA (abdominal aortic aneurysm) (H)      Arthritis      CKD (chronic kidney disease) stage 2, GFR 60-89 ml/min 10/1/2014     Controlled type 2 diabetes mellitus with microalbuminuria or microproteinuria 10/1/2014     DM type 2 (diabetes mellitus, type 2) (H) 2/2001     GERD (gastroesophageal reflux disease)      History of basal cell cancer 4/26/2019     HTN (hypertension) 1976     Hypercalcemia 3/13/2015    From chlorthalidone.     Hyperlipidemia      Hypertension goal BP (blood pressure) < 140/90 1/20/2011     Impotence     Partial / ED     Lumbar spinal stenosis      Nonsenile cataract      NONSPECIFIC MEDICAL HISTORY     Increased Uric Acid Level, no gout     Obesity      NORMA (obstructive sleep apnea) 3/18/2011     Past Surgical History:   Procedure Laterality Date     BIOPSY       COLONOSCOPY       OPTICAL TRACKING SYSTEM FUSION POSTERIOR SPINE LUMBAR  4/29/2013    Procedure: OPTICAL TRACKING SYSTEM FUSION SPINE POSTERIOR LUMBAR ONE LEVEL;  Lumbar 4-5  Laminectomy, Decompression; Lumbar 4-5 Transforaminal Lumbar Interbody Fusion ;  Surgeon: Saurav Valdez MD;  Location: [facility] OR     Current Outpatient Medications   Medication Sig Dispense Refill     acetaminophen (TYLENOL) 650 MG CR tablet Take 1 tablet (650 mg) by mouth every 8 hours as needed for pain 60 tablet 1     allopurinol (ZYLOPRIM) 100 MG tablet TAKE 1 TABLET (100 MG) BY MOUTH 2 TIMES DAILY 180 tablet 3     ammonium lactate (LAC-HYDRIN) 12 % external cream Apply topically 2 times daily as needed for dry skin 385 g 3     aspirin 81 MG tablet Take 1 tablet by mouth daily.       blood glucose (GOOD CONTOUR) test strip 1 strip by In Vitro route daily Use to test blood sugars  daily or as directed. 1 Box 11     blood glucose (NO BRAND SPECIFIED) lancets standard Use to test blood sugar 1 times daily or as directed. One touch or per patients insurance 100 each 1     blood glucose (NO BRAND SPECIFIED) test strip Use to test blood sugar 1 times daily or as directed. One touch or per patient's insurance 100 strip 1     blood glucose monitoring (ONE TOUCH ULTRA 2) meter device kit Use to test blood sugar 1 times daily or as directed. One touch or per patient's insurance 1 kit 0     chlorthalidone (HYGROTON) 25 MG tablet Take 1 tablet (25 mg) by mouth daily 90 tablet 4     diltiazem ER COATED BEADS (CARDIZEM LA) 240 MG 24 hr tablet Take 1 tablet (240 mg) by mouth daily 90 tablet 4     Glucosamine-Chondroit-Vit C-Mn (GLUCOSAMINE-CHONDROITIN MAX ST PO) Take by mouth 2 times daily 1500/1103mg       lisinopril (ZESTRIL) 40 MG tablet Take 1 tablet (40 mg) by mouth daily 90 tablet 4     metFORMIN (GLUCOPHAGE) 500 MG tablet Take 1 tablet (500 mg) by mouth 2 times daily (with meals) 180 tablet 3     mometasone (ELOCON) 0.1 % external cream Apply sparingly to affected area twice daily as needed.  Do not apply to face. 45 g 3     pravastatin (PRAVACHOL) 80 MG tablet Take 1 tablet (80 mg) by mouth daily 90  "tablet 3     vitamin C (ASCORBIC ACID) 250 MG tablet Take 250 mg by mouth daily       Vitamin D3 (CHOLECALCIFEROL) 25 mcg (1000 units) tablet Take 25 mcg by mouth daily       vitamin E 400 units TABS Take 400 Units by mouth daily         Allergies   Allergen Reactions     Pollen [Pollen Extract]         Social History     Tobacco Use     Smoking status: Former Smoker     Packs/day: 0.50     Years: 10.00     Pack years: 5.00     Types: Cigars     Quit date: 1985     Years since quittin.7     Smokeless tobacco: Never Used   Substance Use Topics     Alcohol use: Yes     Alcohol/week: 5.8 standard drinks     Comment: 2 per month       History   Drug Use No         Objective     /70   Pulse 70   Temp 97.8  F (36.6  C) (Oral)   Ht 1.776 m (5' 9.93\")   Wt 99.8 kg (220 lb)   SpO2 97%   BMI 31.63 kg/m      Physical Exam  GENERAL APPEARANCE: healthy, alert and no distress  HENT: ear canals and TM's normal and nose and mouth without ulcers or lesions  RESP: lungs clear to auscultation - no rales, rhonchi or wheezes  CV: regular rate and rhythm, normal S1 S2, no S3 or S4 and no murmur, click or rub   ABDOMEN: soft, nontender, no HSM or masses and bowel sounds normal  NEURO: Normal strength and tone, sensory exam grossly normal, mentation intact and speech normal    Recent Labs   Lab Test 21  0903 21  0734 20  0750 20  0750   HGB  --   --   --  14.8   PLT  --   --   --  297    133   < > 132*   POTASSIUM 3.6 3.6   < > 3.5   CR 1.09 1.10   < > 0.98   A1C 6.3* 6.6*   < > 5.9*    < > = values in this interval not displayed.        Diagnostics:  No labs were ordered during this visit.   EKG: appears normal, NSR, AV block, normal axis, normal intervals, no acute ST/T changes c/w ischemia, no LVH by voltage criteria, there are no prior tracings available    Revised Cardiac Risk Index (RCRI):  The patient has the following serious cardiovascular risks for perioperative complications:   " - No serious cardiac risks = 0 points     RCRI Interpretation: 1 point: Class II (low risk - 0.9% complication rate)           Signed Electronically by: Tiffani Levi PA-C  Copy of this evaluation report is provided to requesting physician.

## 2021-11-21 ENCOUNTER — HEALTH MAINTENANCE LETTER (OUTPATIENT)
Age: 81
End: 2021-11-21

## 2022-02-09 ENCOUNTER — OFFICE VISIT (OUTPATIENT)
Dept: OPHTHALMOLOGY | Facility: CLINIC | Age: 82
End: 2022-02-09
Payer: COMMERCIAL

## 2022-02-09 DIAGNOSIS — H25.813 COMBINED FORMS OF AGE-RELATED CATARACT OF BOTH EYES: ICD-10-CM

## 2022-02-09 DIAGNOSIS — H02.834 DERMATOCHALASIS OF BOTH UPPER EYELIDS: ICD-10-CM

## 2022-02-09 DIAGNOSIS — H52.4 MYOPIA OF BOTH EYES WITH REGULAR ASTIGMATISM AND PRESBYOPIA: ICD-10-CM

## 2022-02-09 DIAGNOSIS — H52.223 MYOPIA OF BOTH EYES WITH REGULAR ASTIGMATISM AND PRESBYOPIA: ICD-10-CM

## 2022-02-09 DIAGNOSIS — N18.31 TYPE 2 DIABETES MELLITUS WITH STAGE 3A CHRONIC KIDNEY DISEASE, WITHOUT LONG-TERM CURRENT USE OF INSULIN (H): ICD-10-CM

## 2022-02-09 DIAGNOSIS — H02.831 DERMATOCHALASIS OF BOTH UPPER EYELIDS: ICD-10-CM

## 2022-02-09 DIAGNOSIS — E11.22 TYPE 2 DIABETES MELLITUS WITH STAGE 3A CHRONIC KIDNEY DISEASE, WITHOUT LONG-TERM CURRENT USE OF INSULIN (H): ICD-10-CM

## 2022-02-09 DIAGNOSIS — Z01.00 EXAMINATION OF EYES AND VISION: Primary | ICD-10-CM

## 2022-02-09 DIAGNOSIS — H52.13 MYOPIA OF BOTH EYES WITH REGULAR ASTIGMATISM AND PRESBYOPIA: ICD-10-CM

## 2022-02-09 PROCEDURE — 92015 DETERMINE REFRACTIVE STATE: CPT | Performed by: STUDENT IN AN ORGANIZED HEALTH CARE EDUCATION/TRAINING PROGRAM

## 2022-02-09 PROCEDURE — 92014 COMPRE OPH EXAM EST PT 1/>: CPT | Performed by: STUDENT IN AN ORGANIZED HEALTH CARE EDUCATION/TRAINING PROGRAM

## 2022-02-09 ASSESSMENT — VISUAL ACUITY
OD_SC+: -1
METHOD: SNELLEN - LINEAR
CORRECTION_TYPE: GLASSES
OS_SC: 20/30
OD_SC: 20/30
OS_SC+: -1

## 2022-02-09 ASSESSMENT — CUP TO DISC RATIO
OD_RATIO: 0.3
OS_RATIO: 0.3

## 2022-02-09 ASSESSMENT — EXTERNAL EXAM - LEFT EYE: OS_EXAM: NORMAL

## 2022-02-09 ASSESSMENT — REFRACTION_MANIFEST
OD_CYLINDER: +0.75
OS_SPHERE: -0.25
OD_AXIS: 141
OS_CYLINDER: +0.75
OS_ADD: +2.75
OD_SPHERE: -0.50
OS_AXIS: 015
OD_ADD: +2.75

## 2022-02-09 ASSESSMENT — TONOMETRY
OD_IOP_MMHG: 19
IOP_METHOD: APPLANATION
OS_IOP_MMHG: 18

## 2022-02-09 ASSESSMENT — CONF VISUAL FIELD
OD_SUPERIOR_TEMPORAL_RESTRICTION: 3
OS_NORMAL: 1

## 2022-02-09 ASSESSMENT — EXTERNAL EXAM - RIGHT EYE: OD_EXAM: NORMAL

## 2022-02-09 ASSESSMENT — REFRACTION_WEARINGRX
OD_SPHERE: +2.50
OD_CYLINDER: SPHERE
OS_SPHERE: +2.50
SPECS_TYPE: OTC READERS
OS_CYLINDER: SPHERE

## 2022-02-09 NOTE — PROGRESS NOTES
Current Eye Medications:  Artificial tears both eyes     Subjective:  Here for complete eye exam today. DM, last a1c was 9-1-21 on 6.3. Does not wear glasses all the time, even to read. If he raises his eyebrows, he can see better.    Lab Results   Component Value Date    A1C 6.3 09/01/2021    A1C 6.6 03/03/2021    A1C 5.9 09/17/2020    A1C 6.3 10/30/2019    A1C 6.3 04/26/2019    A1C 6.4 04/18/2018      Objective:  See Ophthalmology Exam.       Assessment:  Zoey Romo is a 81 year old male who presents with:   Encounter Diagnoses   Name Primary?     Examination of eyes and vision      Myopia of both eyes with regular astigmatism and presbyopia        Type 2 diabetes mellitus with stage 3a chronic kidney disease, without long-term current use of insulin (H)   Negative diabetic retinopathy      Combined forms of age-related cataract of both eyes Not visually significant        Dermatochalasis of both upper eyelids Heavy DCH both upper lids. On 81 aspirin daily.        Plan:  Referral to oculoplastics for droopy lid evaluation/treatment    Continue artificial tears up to four times a day as needed    Continue over the counter readers or can fill glasses prescription given    Keep blood sugars and blood pressure under good control.    Johnny Cowan MD  [phone]    Patient Education   Diabetes weakens the blood vessels all over the body, including the eyes. Damage to the blood vessels in the eyes can cause swelling or bleeding into part of the eye (called the retina). This is called diabetic retinopathy (LEANNE-tin--puh-thee). If not treated, this disease can cause vision loss or blindness.   Symptoms may include blurred or distorted vision, but many people have no symptoms. It's important to see your eye doctor regularly to check for problems.   Early treatment and good control can help protect your vision. Here are the things you can do to help prevent vision loss:      1. Keep your blood sugar levels  under tight control.      2. Bring high blood pressure under control.      3. No smoking.      4. Have yearly dilated eye exams.

## 2022-02-09 NOTE — PATIENT INSTRUCTIONS
Referral to oculoplastics for droopy lid evaluation/treatment    Continue artificial tears up to four times a day as needed    Continue over the counter readers or can fill glasses prescription given    Keep blood sugars and blood pressure under good control.    Johnny Cowan MD  [phone]    Patient Education   Diabetes weakens the blood vessels all over the body, including the eyes. Damage to the blood vessels in the eyes can cause swelling or bleeding into part of the eye (called the retina). This is called diabetic retinopathy ([facility]-thee). If not treated, this disease can cause vision loss or blindness.   Symptoms may include blurred or distorted vision, but many people have no symptoms. It's important to see your eye doctor regularly to check for problems.   Early treatment and good control can help protect your vision. Here are the things you can do to help prevent vision loss:      1. Keep your blood sugar levels under tight control.      2. Bring high blood pressure under control.      3. No smoking.      4. Have yearly dilated eye exams.

## 2022-02-09 NOTE — LETTER
2/9/2022         RE: Zoey Romo  [address] [address]  [facility] [address]        Dear Colleague,    Thank you for referring your patient, Zoey Romo, to the [facility]. Please see a copy of my visit note below.     Current Eye Medications:  Artificial tears both eyes     Subjective:  Here for complete eye exam today. DM, last a1c was 9-1-21 on 6.3. Does not wear glasses all the time, even to read. If he raises his eyebrows, he can see better.    Lab Results   Component Value Date    A1C 6.3 09/01/2021    A1C 6.6 03/03/2021    A1C 5.9 09/17/2020    A1C 6.3 10/30/2019    A1C 6.3 04/26/2019    A1C 6.4 04/18/2018      Objective:  See Ophthalmology Exam.       Assessment:  Zoey Romo is a 81 year old male who presents with:   Encounter Diagnoses   Name Primary?     Examination of eyes and vision      Myopia of both eyes with regular astigmatism and presbyopia        Type 2 diabetes mellitus with stage 3a chronic kidney disease, without long-term current use of insulin (H)   Negative diabetic retinopathy      Combined forms of age-related cataract of both eyes Not visually significant        Dermatochalasis of both upper eyelids Heavy DCH both upper lids. On 81 aspirin daily.        Plan:  Referral to oculoplastics for droopy lid evaluation/treatment    Continue artificial tears up to four times a day as needed    Continue over the counter readers or can fill glasses prescription given    Keep blood sugars and blood pressure under good control.    Johnny Cowan MD  [phone]    Patient Education   Diabetes weakens the blood vessels all over the body, including the eyes. Damage to the blood vessels in the eyes can cause swelling or bleeding into part of the eye (called the retina). This is called diabetic retinopathy (LEANNE-tin-AH-puh-thee). If not treated, this disease can cause vision loss or blindness.   Symptoms may include blurred or distorted vision, but many  people have no symptoms. It's important to see your eye doctor regularly to check for problems.   Early treatment and good control can help protect your vision. Here are the things you can do to help prevent vision loss:      1. Keep your blood sugar levels under tight control.      2. Bring high blood pressure under control.      3. No smoking.      4. Have yearly dilated eye exams.             Again, thank you for allowing me to participate in the care of your patient.        Sincerely,        Johnny Cowan MD

## 2022-02-16 ENCOUNTER — OFFICE VISIT (OUTPATIENT)
Dept: OPHTHALMOLOGY | Facility: CLINIC | Age: 82
End: 2022-02-16
Attending: STUDENT IN AN ORGANIZED HEALTH CARE EDUCATION/TRAINING PROGRAM
Payer: COMMERCIAL

## 2022-02-16 DIAGNOSIS — E11.22 TYPE 2 DIABETES MELLITUS WITH STAGE 3A CHRONIC KIDNEY DISEASE, WITHOUT LONG-TERM CURRENT USE OF INSULIN (H): Primary | ICD-10-CM

## 2022-02-16 DIAGNOSIS — N18.31 TYPE 2 DIABETES MELLITUS WITH STAGE 3A CHRONIC KIDNEY DISEASE, WITHOUT LONG-TERM CURRENT USE OF INSULIN (H): Primary | ICD-10-CM

## 2022-02-16 DIAGNOSIS — H02.403 INVOLUTIONAL PTOSIS, ACQUIRED, BILATERAL: ICD-10-CM

## 2022-02-16 DIAGNOSIS — H02.831 DERMATOCHALASIS OF BOTH UPPER EYELIDS: ICD-10-CM

## 2022-02-16 DIAGNOSIS — Z11.59 ENCOUNTER FOR SCREENING FOR OTHER VIRAL DISEASES: Primary | ICD-10-CM

## 2022-02-16 DIAGNOSIS — H02.834 DERMATOCHALASIS OF BOTH UPPER EYELIDS: ICD-10-CM

## 2022-02-16 PROCEDURE — 99204 OFFICE O/P NEW MOD 45 MIN: CPT | Performed by: OPHTHALMOLOGY

## 2022-02-16 PROCEDURE — 92081 LIMITED VISUAL FIELD XM: CPT | Performed by: OPHTHALMOLOGY

## 2022-02-16 PROCEDURE — 92285 EXTERNAL OCULAR PHOTOGRAPHY: CPT | Performed by: OPHTHALMOLOGY

## 2022-02-16 ASSESSMENT — CONF VISUAL FIELD: COMMENTS: TANGENT VISUAL FIELD PERFORMED TODAY.

## 2022-02-16 ASSESSMENT — TONOMETRY
OD_IOP_MMHG: 21
OS_IOP_MMHG: 20
IOP_METHOD: ICARE

## 2022-02-16 ASSESSMENT — VISUAL ACUITY
METHOD: SNELLEN - LINEAR
OD_SC: 20/25
OS_SC: 20/40
OD_SC+: -1

## 2022-02-16 ASSESSMENT — EXTERNAL EXAM - LEFT EYE: OS_EXAM: NORMAL

## 2022-02-16 NOTE — LETTER
2022         RE:  :  MRN: Zoey Romo  1940  [id number]     Dear Dr. Cowan,    Thank you for asking me to see your patient, Zoey Romo, for an oculoplastic   consultation.  My assessment and plan are below.  For further details, please see my attached clinic note.           HPI:     Chief Complaint(s) and History of Present Illness(es)     Droopy Eye Lid Evaluation     Laterality: right upper lid and left upper lid              Comments     Patient referred by Dr. Cowan for dermatochalasis consult. Patient here   due to drooping of the lids. Has troubles reading, notes he has to raise   his eyelids up to be able to see better. Would like to discuss options.     Patient is diabetic.   Lab Results       Component                Value               Date                       A1C                      6.3                 2021                 A1C                      6.6                 2021                 A1C                      5.9                 2020                 A1C                      6.3                 10/30/2019                 A1C                      6.3                 2019                 A1C                      6.4                 2018                            Zoey Romo is a 81 year old male who has noted gradual onset of droopy eyelids over the past years. The droopy eyelid is interfering with activities of daily living including driving, and reading. The patient denies double vision, variability of the eyelid position. He does occasionally use artificial tears for dry eyes.      EXAM:     MRD1: 0 mm right eye and 1 mm left eye   Dermatochalasis with excess skin touching eyelashes     VISUAL FIELD:  Right eye untaped:0 degrees Right eye taped:40 degrees  Left eye untaped:10 degrees Left eye taped:40 degrees    Assessment & Plan     Zoey Romo is a 81 year old male with the following diagnoses:   1. Type 2 diabetes mellitus  with stage 3a chronic kidney disease, without long-term current use of insulin (H)    2. Dermatochalasis of both upper eyelids    3. Involutional ptosis, acquired, bilateral           Both upper eyelid blepharoplasty  and Bilateral ptosis repair MMCR 9 right eye and 8 left eye       ANTICOAGULATION:    Aspirin 81 mg      Again, thank you for allowing me to participate in the care of your patient.      Sincerely,    Wilson Scruggs MD  Department of Ophthalmology and Visual Neurosciences  [facility]    CC: Johnny Cowan MD  [address] [address]  Via In Basket

## 2022-02-16 NOTE — NURSING NOTE
Chief Complaints and History of Present Illnesses   Patient presents with     Droopy Eye Lid Evaluation       Chief Complaint(s) and History of Present Illness(es)     Droopy Eye Lid Evaluation     Laterality: right upper lid and left upper lid              Comments     Patient referred by Dr. Cowan for dermatochalasis consult. Patient here due to drooping of the lids. Has troubles reading, notes he has to raise his eyelids up to be able to see better. Would like to discuss options.     Patient is diabetic.   Lab Results       Component                Value               Date                       A1C                      6.3                 09/01/2021                 A1C                      6.6                 03/03/2021                 A1C                      5.9                 09/17/2020                 A1C                      6.3                 10/30/2019                 A1C                      6.3                 04/26/2019                 A1C                      6.4                 04/18/2018                              Lynnette Gómez, COA

## 2022-02-16 NOTE — PROGRESS NOTES
Oculoplastic Clinic New Patient    Patient: Zoey Romo MRN# [id number]   YOB: 1940 Age: 81 year old   Date of Visit: Feb 16, 2022    CC: Droopy eyelids obstructing vision.              HPI:     Chief Complaint(s) and History of Present Illness(es)     Droopy Eye Lid Evaluation     Laterality: right upper lid and left upper lid              Comments     Patient referred by Dr. Cowan for dermatochalasis consult. Patient here   due to drooping of the lids. Has troubles reading, notes he has to raise   his eyelids up to be able to see better. Would like to discuss options.     Patient is diabetic.   Lab Results       Component                Value               Date                       A1C                      6.3                 09/01/2021                 A1C                      6.6                 03/03/2021                 A1C                      5.9                 09/17/2020                 A1C                      6.3                 10/30/2019                 A1C                      6.3                 04/26/2019                 A1C                      6.4                 04/18/2018                            Zoey Romo is a 81 year old male who has noted gradual onset of droopy eyelids over the past years. The droopy eyelid is interfering with activities of daily living including driving, and reading. The patient denies double vision, variability of the eyelid position. He does occasionally use artificial tears for dry eyes.      EXAM:     MRD1: 0 mm right eye and 1 mm left eye   Dermatochalasis with excess skin touching eyelashes     VISUAL FIELD:  Right eye untaped:0 degrees Right eye taped:40 degrees  Left eye untaped:10 degrees Left eye taped:40 degrees    Assessment & Plan     Zoey Romo is a 81 year old male with the following diagnoses:   1. Type 2 diabetes mellitus with stage 3a chronic kidney disease, without long-term current use of insulin (H)    2.  Dermatochalasis of both upper eyelids    3. Involutional ptosis, acquired, bilateral           Both upper eyelid blepharoplasty  and Bilateral ptosis repair MMCR 9 right eye and 8 left eye       ANTICOAGULATION:    Aspirin 81 mg       PHOTOS DEMONSTRATE:    Significant dermatochalasis with lids resting on eyelashes and obstructing visual axis  Blepharoptosis  Brow ptosis with thicker brow skin and hairs below the superior orbital rim especially nasally.     Attending Physician Attestation: Complete documentation of historical and exam elements from today's encounter can be found in the full encounter summary report (not reduplicated in this progress note). I personally obtained the chief complaint(s) and history of present illness. I confirmed and edited as necessary the review of systems, past medical/surgical history, family history, social history, and examination findings as documented by others; and I examined the patient myself. I personally reviewed the relevant tests, images, and reports as documented above. I formulated and edited as necessary the assessment and plan and discussed the findings and management plan with the patient. Wilson Scruggs MD      Today with Zoey Romo I reviewed the indications, risks, benefits, and alternatives of the proposed surgical procedure including, but not limited to, failure obtain the desired result  and need for additional surgery, bleeding, infection, loss of vision, loss of the eye, and the remote possibility of permanent damage to any organ system or death with the use of anesthesia.  I provided multiple opportunities for the questions, answered all questions to the best of my ability, and confirmed that my answers and my discussion were understood.

## 2022-02-19 ENCOUNTER — MYC MEDICAL ADVICE (OUTPATIENT)
Dept: INTERNAL MEDICINE | Facility: CLINIC | Age: 82
End: 2022-02-19
Payer: COMMERCIAL

## 2022-02-19 DIAGNOSIS — E11.22 TYPE 2 DIABETES MELLITUS WITH STAGE 3A CHRONIC KIDNEY DISEASE, WITHOUT LONG-TERM CURRENT USE OF INSULIN (H): Primary | ICD-10-CM

## 2022-02-19 DIAGNOSIS — I10 HYPERTENSION GOAL BP (BLOOD PRESSURE) < 130/80: ICD-10-CM

## 2022-02-19 DIAGNOSIS — N18.31 TYPE 2 DIABETES MELLITUS WITH STAGE 3A CHRONIC KIDNEY DISEASE, WITHOUT LONG-TERM CURRENT USE OF INSULIN (H): Primary | ICD-10-CM

## 2022-02-19 DIAGNOSIS — E83.52 HYPERCALCEMIA: ICD-10-CM

## 2022-02-21 NOTE — TELEPHONE ENCOUNTER
Lab orders are in.  Please offer to help him schedule lab appt.  Does not need to fast, lipids due in summer.

## 2022-03-11 ENCOUNTER — OFFICE VISIT (OUTPATIENT)
Dept: INTERNAL MEDICINE | Facility: CLINIC | Age: 82
End: 2022-03-11
Payer: COMMERCIAL

## 2022-03-11 ENCOUNTER — TELEPHONE (OUTPATIENT)
Dept: FAMILY MEDICINE | Facility: CLINIC | Age: 82
End: 2022-03-11

## 2022-03-11 VITALS
TEMPERATURE: 97.4 F | HEIGHT: 67 IN | BODY MASS INDEX: 33.59 KG/M2 | HEART RATE: 79 BPM | WEIGHT: 214 LBS | SYSTOLIC BLOOD PRESSURE: 160 MMHG | OXYGEN SATURATION: 98 % | DIASTOLIC BLOOD PRESSURE: 80 MMHG

## 2022-03-11 DIAGNOSIS — N18.31 TYPE 2 DIABETES MELLITUS WITH STAGE 3A CHRONIC KIDNEY DISEASE, WITHOUT LONG-TERM CURRENT USE OF INSULIN (H): ICD-10-CM

## 2022-03-11 DIAGNOSIS — E21.3 HYPERPARATHYROIDISM (H): ICD-10-CM

## 2022-03-11 DIAGNOSIS — N18.2 CKD (CHRONIC KIDNEY DISEASE) STAGE 2, GFR 60-89 ML/MIN: ICD-10-CM

## 2022-03-11 DIAGNOSIS — E11.22 TYPE 2 DIABETES MELLITUS WITH STAGE 3A CHRONIC KIDNEY DISEASE, WITHOUT LONG-TERM CURRENT USE OF INSULIN (H): ICD-10-CM

## 2022-03-11 DIAGNOSIS — I71.40 ABDOMINAL AORTIC ANEURYSM (AAA) WITHOUT RUPTURE (H): ICD-10-CM

## 2022-03-11 DIAGNOSIS — E21.3 HYPERPARATHYROIDISM (H): Primary | ICD-10-CM

## 2022-03-11 DIAGNOSIS — I10 HYPERTENSION GOAL BP (BLOOD PRESSURE) < 130/80: ICD-10-CM

## 2022-03-11 DIAGNOSIS — E21.0 PRIMARY HYPERPARATHYROIDISM (H): ICD-10-CM

## 2022-03-11 DIAGNOSIS — Z00.01 ENCOUNTER FOR GENERAL ADULT MEDICAL EXAMINATION WITH ABNORMAL FINDINGS: Primary | ICD-10-CM

## 2022-03-11 LAB
ALBUMIN SERPL-MCNC: 3.8 G/DL (ref 3.4–5)
ALP SERPL-CCNC: 37 U/L (ref 40–150)
ALT SERPL W P-5'-P-CCNC: 21 U/L (ref 0–70)
ANION GAP SERPL CALCULATED.3IONS-SCNC: 5 MMOL/L (ref 3–14)
AST SERPL W P-5'-P-CCNC: 15 U/L (ref 0–45)
BILIRUB SERPL-MCNC: 0.4 MG/DL (ref 0.2–1.3)
BUN SERPL-MCNC: 25 MG/DL (ref 7–30)
CALCIUM SERPL-MCNC: 10.9 MG/DL (ref 8.5–10.1)
CHLORIDE BLD-SCNC: 101 MMOL/L (ref 94–109)
CO2 SERPL-SCNC: 29 MMOL/L (ref 20–32)
CREAT SERPL-MCNC: 1.33 MG/DL (ref 0.66–1.25)
ERYTHROCYTE [DISTWIDTH] IN BLOOD BY AUTOMATED COUNT: 12.5 % (ref 10–15)
GFR SERPL CREATININE-BSD FRML MDRD: 53 ML/MIN/1.73M2
GLUCOSE BLD-MCNC: 140 MG/DL (ref 70–99)
HBA1C MFR BLD: 6.4 % (ref 0–5.6)
HCT VFR BLD AUTO: 42.2 % (ref 40–53)
HGB BLD-MCNC: 14.5 G/DL (ref 13.3–17.7)
MCH RBC QN AUTO: 32.9 PG (ref 26.5–33)
MCHC RBC AUTO-ENTMCNC: 34.4 G/DL (ref 31.5–36.5)
MCV RBC AUTO: 96 FL (ref 78–100)
PLATELET # BLD AUTO: 263 10E3/UL (ref 150–450)
POTASSIUM BLD-SCNC: 3.4 MMOL/L (ref 3.4–5.3)
PROT SERPL-MCNC: 7.7 G/DL (ref 6.8–8.8)
RBC # BLD AUTO: 4.41 10E6/UL (ref 4.4–5.9)
SODIUM SERPL-SCNC: 135 MMOL/L (ref 133–144)
WBC # BLD AUTO: 10.8 10E3/UL (ref 4–11)

## 2022-03-11 PROCEDURE — 99397 PER PM REEVAL EST PAT 65+ YR: CPT | Performed by: INTERNAL MEDICINE

## 2022-03-11 PROCEDURE — 80053 COMPREHEN METABOLIC PANEL: CPT | Performed by: INTERNAL MEDICINE

## 2022-03-11 PROCEDURE — 36415 COLL VENOUS BLD VENIPUNCTURE: CPT | Performed by: INTERNAL MEDICINE

## 2022-03-11 PROCEDURE — 99214 OFFICE O/P EST MOD 30 MIN: CPT | Mod: 25 | Performed by: INTERNAL MEDICINE

## 2022-03-11 PROCEDURE — 83036 HEMOGLOBIN GLYCOSYLATED A1C: CPT | Performed by: INTERNAL MEDICINE

## 2022-03-11 PROCEDURE — 85027 COMPLETE CBC AUTOMATED: CPT | Performed by: INTERNAL MEDICINE

## 2022-03-11 ASSESSMENT — ACTIVITIES OF DAILY LIVING (ADL): CURRENT_FUNCTION: NO ASSISTANCE NEEDED

## 2022-03-11 NOTE — PROGRESS NOTES
"SUBJECTIVE:   Zoey Romo is a 81 year old male who presents for Preventive Visit.    82 y/o M here for SARAH.  H/o DMI, NORMA, HTN, AAA (yearly US in Feb), Primary Hyperparathyroidism (9/22/21: normal BMD).         Patient has been advised of split billing requirements and indicates understanding: Yes  Are you in the first 12 months of your Medicare coverage?  No    Healthy Habits:    In general, how would you rate your overall health?  Very good    Frequency of exercise:  1 day/week    Duration of exercise:  15-30 minutes    Do you usually eat at least 4 servings of fruit and vegetables a day, include whole grains    & fiber and avoid regularly eating high fat or \"junk\" foods?  Yes    Taking medications regularly:  Yes    Barriers to taking medications:  None    Medication side effects:  None    Ability to successfully perform activities of daily living:  No assistance needed    Home Safety:  No safety concerns identified    Hearing Impairment:  No hearing concerns    In the past 6 months, have you been bothered by leaking of urine?  No    In general, how would you rate your overall mental or emotional health?  Very good      PHQ-2 Total Score:    Additional concerns today:  No    Do you feel safe in your environment? Yes    Have you ever done Advance Care Planning? (For example, a Health Directive, POLST, or a discussion with a medical provider or your loved ones about your wishes): Yes, advance care planning is on file.       Fall risk       Cognitive Screening   1) Repeat 3 items (Leader, Season, Table)    2) Clock draw: NORMAL  3) 3 item recall: Recalls 3 objects  Results: 3 items recalled: COGNITIVE IMPAIRMENT LESS LIKELY    Mini-CogTM Copyright ANASTASIA Chadwick. Licensed by the author for use in [facility]; reprinted with permission (ashu@.[email or web address]). All rights reserved.      Do you have sleep apnea, excessive snoring or daytime drowsiness?: sleep apnea    Reviewed and updated as needed this visit by " clinical staff                  Reviewed and updated as needed this visit by Provider                 Social History     Tobacco Use     Smoking status: Former Smoker     Packs/day: 0.50     Years: 10.00     Pack years: 5.00     Types: Cigars     Quit date: 1985     Years since quittin.1     Smokeless tobacco: Never Used   Substance Use Topics     Alcohol use: Yes     Alcohol/week: 5.8 standard drinks     Comment: 2 per month     If you drink alcohol do you typically have >3 drinks per day or >7 drinks per week? No           No concerns     Current providers sharing in care for this patient include:   Patient Care Team:  Kia Mcmanus MD as PCP - General (Internal Medicine)  Kia Mcmanus MD as Assigned PCP  Wilson Scruggs MD as Assigned Surgical Provider    The following health maintenance items are reviewed in Epic and correct as of today:  Health Maintenance Due   Topic Date Due     MEDICARE ANNUAL WELLNESS VISIT  2021     PHQ-2 (once per calendar year)  2022     A1C  2022     HEMOGLOBIN  2021     Lab work is in process  Labs reviewed in EPIC  BP Readings from Last 3 Encounters:   22 (!) 162/93   10/28/21 136/70   21 137/83    Wt Readings from Last 3 Encounters:   22 97.1 kg (214 lb)   10/28/21 99.8 kg (220 lb)   21 98.9 kg (218 lb)                  Patient Active Problem List   Diagnosis     GERD (gastroesophageal reflux disease)     HYPERLIPIDEMIA LDL GOAL <100     Dermatochalasis OU     NORMA (obstructive sleep apnea)     Advanced directives, counseling/discussion     Gout     Cataract OU     Lumbar stenosis     Spondylolisthesis of lumbar region     Fusion of spine     Eczema     CKD (chronic kidney disease) stage 2, GFR 60-89 ml/min     Hypercalcemia     Type 2 diabetes mellitus with stage 3a chronic kidney disease, without long-term current use of insulin (H)     Gastroesophageal reflux disease without esophagitis     Chronic gout  without tophus, unspecified cause, unspecified site     Abdominal aortic aneurysm without rupture (H)     Hypertension goal BP (blood pressure) < 130/80     Type 2 diabetes mellitus without complication, without long-term current use of insulin (H)     Multiple nevi     History of basal cell cancer     Primary hyperparathyroidism (H)     AV block     Involutional ptosis, acquired, bilateral     Past Surgical History:   Procedure Laterality Date     BIOPSY       COLONOSCOPY       OPTICAL TRACKING SYSTEM FUSION POSTERIOR SPINE LUMBAR  2013    Procedure: OPTICAL TRACKING SYSTEM FUSION SPINE POSTERIOR LUMBAR ONE LEVEL;  Lumbar 4-5 Laminectomy, Decompression; Lumbar 4-5 Transforaminal Lumbar Interbody Fusion ;  Surgeon: Saurav Valdez MD;  Location:  OR       Social History     Tobacco Use     Smoking status: Former Smoker     Packs/day: 0.50     Years: 10.00     Pack years: 5.00     Types: Cigars     Quit date: 1985     Years since quittin.1     Smokeless tobacco: Never Used   Substance Use Topics     Alcohol use: Yes     Alcohol/week: 5.8 standard drinks     Comment: occasional      Family History   Problem Relation Age of Onset     Heart Disease Father      Cancer Paternal Grandfather         leukemia      Heart Disease Brother      Diabetes Brother          Current Outpatient Medications   Medication Sig Dispense Refill     acetaminophen (TYLENOL) 650 MG CR tablet Take 1 tablet (650 mg) by mouth every 8 hours as needed for pain 60 tablet 1     allopurinol (ZYLOPRIM) 100 MG tablet TAKE 1 TABLET (100 MG) BY MOUTH 2 TIMES DAILY 180 tablet 3     ammonium lactate (LAC-HYDRIN) 12 % external cream Apply topically 2 times daily as needed for dry skin 385 g 3     aspirin 81 MG tablet Take 1 tablet by mouth daily.       blood glucose (GOOD CONTOUR) test strip 1 strip by In Vitro route daily Use to test blood sugars  daily or as directed. 1 Box 11     blood glucose (NO BRAND SPECIFIED) lancets  standard Use to test blood sugar 1 times daily or as directed. 100 each 3     blood glucose (NO BRAND SPECIFIED) test strip Use to test blood sugar 1 times daily or as directed. One touch or per patient's insurance 100 strip 1     blood glucose monitoring (ONE TOUCH ULTRA 2) meter device kit Use to test blood sugar 1 times daily or as directed. One touch or per patient's insurance 1 kit 0     chlorthalidone (HYGROTON) 25 MG tablet Take 1 tablet (25 mg) by mouth daily 90 tablet 4     diltiazem ER COATED BEADS (CARDIZEM LA) 240 MG 24 hr tablet Take 1 tablet (240 mg) by mouth daily 90 tablet 4     Glucosamine-Chondroit-Vit C-Mn (GLUCOSAMINE-CHONDROITIN MAX ST PO) Take by mouth 2 times daily 1500/1103mg       lisinopril (ZESTRIL) 40 MG tablet Take 1 tablet (40 mg) by mouth daily 90 tablet 4     metFORMIN (GLUCOPHAGE) 500 MG tablet Take 1 tablet (500 mg) by mouth 2 times daily (with meals) 180 tablet 3     mometasone (ELOCON) 0.1 % external cream Apply sparingly to affected area twice daily as needed.  Do not apply to face. 45 g 3     pravastatin (PRAVACHOL) 80 MG tablet Take 1 tablet (80 mg) by mouth daily 90 tablet 3     vitamin C (ASCORBIC ACID) 250 MG tablet Take 250 mg by mouth daily       Vitamin D3 (CHOLECALCIFEROL) 25 mcg (1000 units) tablet Take 25 mcg by mouth daily       vitamin E 400 units TABS Take 400 Units by mouth daily       Allergies   Allergen Reactions     Bee Pollen Other (See Comments)     Pollen [Pollen Extract]      Recent Labs   Lab Test 09/01/21  0903 03/03/21  0734 09/17/20  0750 10/30/19  1006 04/26/19  0903 04/18/18  1049   A1C 6.3* 6.6* 5.9*   < > 6.3* 6.4*   LDL 70  --  66  --  63 67   HDL 65  --  57  --  52 55   TRIG 95  --  86  --  124 140   ALT  --   --  19  --  25 25   CR 1.09 1.10 0.98   < > 0.95 0.98   GFRESTIMATED 63 62 71   < > 75 73   GFRESTBLACK  --  72 83   < > 87 89   POTASSIUM 3.6 3.6 3.5   < > 3.4 3.9   TSH  --   --  2.68  --   --  2.21    < > = values in this interval not  "displayed.               Review of Systems  Constitutional, HEENT, cardiovascular, pulmonary, gi and gu systems are negative, except as otherwise noted.    OBJECTIVE:   BP (!) 160/80 (BP Location: Right arm, Patient Position: Sitting, Cuff Size: Adult Regular)   Pulse 79   Temp 97.4  F (36.3  C) (Oral)   Ht 1.689 m (5' 6.5\")   Wt 97.1 kg (214 lb)   SpO2 98%   BMI 34.02 kg/m   Estimated body mass index is 31.63 kg/m  as calculated from the following:    Height as of 10/28/21: 1.776 m (5' 9.93\").    Weight as of 10/28/21: 99.8 kg (220 lb).  Has not taken his morning medications yet.   Physical Exam  GENERAL: healthy, alert and no distress  EYES: Eyes grossly normal to inspection, PERRL and conjunctivae and sclerae normal  HENT: ear canals and TM's normal, nose and mouth without ulcers or lesions  NECK: no adenopathy, no asymmetry, masses, or scars and thyroid normal to palpation  RESP: lungs clear to auscultation - no rales, rhonchi or wheezes  CV: regular rate and rhythm, normal S1 S2, no S3 or S4, no murmur, click or rub, no peripheral edema and peripheral pulses strong  ABDOMEN: soft, nontender, no hepatosplenomegaly, no masses and bowel sounds normal   (male): testicles normal without atrophy or masses, no hernias and penis normal without urethral discharge  RECTAL: deferred  MS: no gross musculoskeletal defects noted, no edema  SKIN: no suspicious lesions or rashes  NEURO: Normal strength and tone, mentation intact and speech normal  PSYCH: mentation appears normal, affect normal/bright    Diagnostic Test Results:  Labs reviewed in Epic  See orders.         ASSESSMENT / PLAN:       ICD-10-CM    1. Encounter for general adult medical examination with abnormal findings  Z00.01    2. Type 2 diabetes mellitus with stage 3a chronic kidney disease, without long-term current use of insulin (H)  E11.22 Hemoglobin A1c    N18.31    3. Hypertension goal BP (blood pressure) < 130/80  I10 Comprehensive metabolic panel " "(BMP + Alb, Alk Phos, ALT, AST, Total. Bili, TP)   4. Primary hyperparathyroidism (H)  E21.0    5. CKD (chronic kidney disease) stage 2, GFR 60-89 ml/min  N18.2    6. Abdominal aortic aneurysm (AAA) without rupture (H)  I71.4    7. Hyperparathyroidism (H)  E21.3 CBC with platelets     Comprehensive metabolic panel (BMP + Alb, Alk Phos, ALT, AST, Total. Bili, TP)     BP elevated due to not having taken his meds yet  Will check labs for DM and HTN>   DM well controlled on metformin.     LATER ENTRY:        Patient has been advised of split billing requirements and indicates understanding: Yes    COUNSELING:  Reviewed preventive health counseling, as reflected in patient instructions       Prostate cancer screening  Not needed.     Estimated body mass index is 31.63 kg/m  as calculated from the following:    Height as of 10/28/21: 1.776 m (5' 9.93\").    Weight as of 10/28/21: 99.8 kg (220 lb).        He reports that he quit smoking about 37 years ago. His smoking use included cigars. He has a 5.00 pack-year smoking history. He has never used smokeless tobacco.      Appropriate preventive services were discussed with this patient, including applicable screening as appropriate for cardiovascular disease, diabetes, osteopenia/osteoporosis, and glaucoma.  As appropriate for age/gender, discussed screening for colorectal cancer, prostate cancer, breast cancer, and cervical cancer. Checklist reviewing preventive services available has been given to the patient.    Reviewed patients plan of care and provided an AVS. The Basic Care Plan (routine screening as documented in Health Maintenance) for Zoey meets the Care Plan requirement. This Care Plan has been established and reviewed with the Patient.    Counseling Resources:  ATP IV Guidelines  Pooled Cohorts Equation Calculator  Breast Cancer Risk Calculator  Breast Cancer: Medication to Reduce Risk  FRAX Risk Assessment  ICSI Preventive Guidelines  Dietary Guidelines for " Americans, 2010  USDA's MyPlate  ASA Prophylaxis  Lung CA Screening    Kia Mcmanus MD  [facility]    Identified Health Risks:

## 2022-03-11 NOTE — LETTER
"March 15, 2022      Zoey Romo  [address] [address]  [facility] [address]        Dear Zoey:    We are writing to inform you of your test results.    The diabetes control is excellent.       The calcium is a bit more elevated, will need to watch that.  Drink plenty of water.    I would like you to check a bone density sometime prior to our next visit   Call to schedule imaging [phone].    I want to see if the \"hyperparathyroid\" issue is causing thinning of the bones.     Resulted Orders   Hemoglobin A1c   Result Value Ref Range    Hemoglobin A1C 6.4 (H) 0.0 - 5.6 %      Comment:      Normal <5.7%   Prediabetes 5.7-6.4%    Diabetes 6.5% or higher     Note: Adopted from ADA consensus guidelines.   CBC with platelets   Result Value Ref Range    WBC Count 10.8 4.0 - 11.0 10e3/uL    RBC Count 4.41 4.40 - 5.90 10e6/uL    Hemoglobin 14.5 13.3 - 17.7 g/dL    Hematocrit 42.2 40.0 - 53.0 %    MCV 96 78 - 100 fL    MCH 32.9 26.5 - 33.0 pg    MCHC 34.4 31.5 - 36.5 g/dL    RDW 12.5 10.0 - 15.0 %    Platelet Count 263 150 - 450 10e3/uL   Comprehensive metabolic panel (BMP + Alb, Alk Phos, ALT, AST, Total. Bili, TP)   Result Value Ref Range    Sodium 135 133 - 144 mmol/L    Potassium 3.4 3.4 - 5.3 mmol/L    Chloride 101 94 - 109 mmol/L    Carbon Dioxide (CO2) 29 20 - 32 mmol/L    Anion Gap 5 3 - 14 mmol/L    Urea Nitrogen 25 7 - 30 mg/dL    Creatinine 1.33 (H) 0.66 - 1.25 mg/dL    Calcium 10.9 (H) 8.5 - 10.1 mg/dL    Glucose 140 (H) 70 - 99 mg/dL    Alkaline Phosphatase 37 (L) 40 - 150 U/L    AST 15 0 - 45 U/L    ALT 21 0 - 70 U/L    Protein Total 7.7 6.8 - 8.8 g/dL    Albumin 3.8 3.4 - 5.0 g/dL    Bilirubin Total 0.4 0.2 - 1.3 mg/dL    GFR Estimate 53 (L) >60 mL/min/1.73m2      Comment:      Effective December 21, 2021 eGFRcr in adults is calculated using the 2021 CKD-EPI creatinine equation which includes age and gender (Oxana et al., NEJM, DOI: 10.1056/JSDYic2670880)     If you have any questions or concerns, please " call the clinic at the number listed above.     Sincerely,      Kia Mcmanus MD/daniele

## 2022-03-12 NOTE — RESULT ENCOUNTER NOTE
"Zoey Romo    The diabetes control is excellent.      The calcium is a bit more elevated, will need to watch that.  Drink plenty of water    I would like you to check a bone density sometime prior to our next visit   Call to schedule imaging     I want to see if the \"hyperparathyroid\" issue is causing thinning of the bones.     Sincerely,       ABELARDO OREILLY M.D.  "

## 2022-03-12 NOTE — TELEPHONE ENCOUNTER
"Call patient with content below, ensure understanding    Have him RETURN TO CLINIC in 3 months with Labs prior and DEXA prior    Will recheck calcium, HgbA1C and assess bone density    ==============================================   Zoey BRIAN Clarissa     The diabetes control is excellent.       The calcium is a bit more elevated, will need to watch that.  Drink plenty of water     I would like you to check a bone density sometime prior to our next visit   Call to schedule imaging     I want to see if the \"hyperparathyroid\" issue is causing thinning of the bones.      Sincerely,         ABELARDO OREILLY M.D.     "

## 2022-03-18 ENCOUNTER — OFFICE VISIT (OUTPATIENT)
Dept: INTERNAL MEDICINE | Facility: CLINIC | Age: 82
End: 2022-03-18
Payer: COMMERCIAL

## 2022-03-18 ENCOUNTER — LAB (OUTPATIENT)
Dept: LAB | Facility: CLINIC | Age: 82
End: 2022-03-18

## 2022-03-18 ENCOUNTER — ANESTHESIA EVENT (OUTPATIENT)
Dept: SURGERY | Facility: AMBULATORY SURGERY CENTER | Age: 82
End: 2022-03-18
Payer: COMMERCIAL

## 2022-03-18 VITALS
SYSTOLIC BLOOD PRESSURE: 136 MMHG | TEMPERATURE: 97.6 F | OXYGEN SATURATION: 98 % | WEIGHT: 217 LBS | BODY MASS INDEX: 34.5 KG/M2 | DIASTOLIC BLOOD PRESSURE: 82 MMHG | HEART RATE: 77 BPM

## 2022-03-18 DIAGNOSIS — Z11.59 ENCOUNTER FOR SCREENING FOR OTHER VIRAL DISEASES: ICD-10-CM

## 2022-03-18 DIAGNOSIS — H02.539 LID LAG: ICD-10-CM

## 2022-03-18 DIAGNOSIS — I10 HYPERTENSION GOAL BP (BLOOD PRESSURE) < 130/80: ICD-10-CM

## 2022-03-18 DIAGNOSIS — E11.22 TYPE 2 DIABETES MELLITUS WITH STAGE 3A CHRONIC KIDNEY DISEASE, WITHOUT LONG-TERM CURRENT USE OF INSULIN (H): ICD-10-CM

## 2022-03-18 DIAGNOSIS — Z00.00 HEALTHCARE MAINTENANCE: Primary | ICD-10-CM

## 2022-03-18 DIAGNOSIS — N18.31 TYPE 2 DIABETES MELLITUS WITH STAGE 3A CHRONIC KIDNEY DISEASE, WITHOUT LONG-TERM CURRENT USE OF INSULIN (H): ICD-10-CM

## 2022-03-18 DIAGNOSIS — Z01.818 PREOP GENERAL PHYSICAL EXAM: Primary | ICD-10-CM

## 2022-03-18 DIAGNOSIS — E21.0 PRIMARY HYPERPARATHYROIDISM (H): ICD-10-CM

## 2022-03-18 LAB — SARS-COV-2 RNA RESP QL NAA+PROBE: NEGATIVE

## 2022-03-18 PROCEDURE — U0003 INFECTIOUS AGENT DETECTION BY NUCLEIC ACID (DNA OR RNA); SEVERE ACUTE RESPIRATORY SYNDROME CORONAVIRUS 2 (SARS-COV-2) (CORONAVIRUS DISEASE [COVID-19]), AMPLIFIED PROBE TECHNIQUE, MAKING USE OF HIGH THROUGHPUT TECHNOLOGIES AS DESCRIBED BY CMS-2020-01-R: HCPCS

## 2022-03-18 PROCEDURE — 99213 OFFICE O/P EST LOW 20 MIN: CPT | Performed by: INTERNAL MEDICINE

## 2022-03-18 PROCEDURE — U0005 INFEC AGEN DETEC AMPLI PROBE: HCPCS

## 2022-03-18 NOTE — H&P (VIEW-ONLY)
[facility]  [address] [place] [address]  [place] [address]  Phone: [phone]  Primary Provider: Kia Mcmanus  Pre-op Performing Provider: KIA MCMANUS      PREOPERATIVE EVALUATION:  Today's date: 3/18/2022    Zoey Romo is a 82 year old male who presents for a preoperative evaluation.    Surgical Information:  Surgery/Procedure: eyelids  Surgery Location: [place]  Surgeon:   Surgery Date: 3/21/22  Time of Surgery:   Where patient plans to recover: At home with family  Fax number for surgical facility: [phone]    Type of Anesthesia Anticipated: to be determined    Assessment & Plan     The proposed surgical procedure is considered LOW risk.    Preop general physical exam  Low risk surgery    Lid lag   will have blepharoplasty.      Type 2 diabetes mellitus with stage 3a chronic kidney disease, without long-term current use of insulin (H)   well controlled     Hypertension goal BP (blood pressure) < 130/80  Well controlled.     Primary hyperparathyroidism (H)  Educated patient today re: this condition  Will recheck at each DM f/u.               Risks and Recommendations:  The patient has the following additional risks and recommendations for perioperative complications:   - No identified additional risk factors other than previously addressed    Medication Instructions:    Hold aspirin and supplements until after the surgery  Hold Metformin the day before AND and the day of  surgery  The morning of surgery, take only the diltiazem      RECOMMENDATION:  APPROVAL GIVEN to proceed with proposed procedure, without further diagnostic evaluation.  COVID screen is pending.      I spent a total of 20 minutes on the day of the visit.   Time spent doing chart review, history and exam, documentation and further activities per the note         Subjective     HPI related to upcoming procedure: 81 y/o M here for preop.      H/o DMI, NORMA, HTN, AAA (yearly US in Feb), Primary  Hyperparathyroidism (9/22/21: normal BMD).    He will be having radha blepharoplasty on 3/21/22.       Preop Questions 3/18/2022   1. Have you ever had a heart attack or stroke? No   2. Have you ever had surgery on your heart or blood vessels, such as a stent placement, a coronary artery bypass, or surgery on an artery in your head, neck, heart, or legs? No   3. Do you have chest pain with activity? No   4. Do you have a history of  heart failure? No   5. Do you currently have a cold, bronchitis or symptoms of other infection? No   6. Do you have a cough, shortness of breath, or wheezing? No   7. Do you or anyone in your family have previous history of blood clots? No   8. Do you or does anyone in your family have a serious bleeding problem such as prolonged bleeding following surgeries or cuts? No   9. Have you ever had problems with anemia or been told to take iron pills? No   10. Have you had any abnormal blood loss such as black, tarry or bloody stools? No   11. Have you ever had a blood transfusion? No   12. Are you willing to have a blood transfusion if it is medically needed before, during, or after your surgery? Yes   13. Have you or any of your relatives ever had problems with anesthesia? No   14. Do you have sleep apnea, excessive snoring or daytime drowsiness? YES - cpap   14a. Do you have a CPAP machine? No   15. Do you have any artifical heart valves or other implanted medical devices like a pacemaker, defibrillator, or continuous glucose monitor? No   16. Do you have artificial joints? No   17. Are you allergic to latex? No     Health Care Directive:  Patient has a Health Care Directive on file      Preoperative Review of :   reviewed - no record of controlled substances prescribed.       Status of Chronic Conditions:  DIABETES - Patient has a longstanding history of DiabetesType Type II . Patient is being treated with oral agents and denies significant side effects. Control has been good.  Complicating factors include but are not limited to: neuropathy.     HYPERTENSION - Patient has longstanding history of HTN , currently denies any symptoms referable to elevated blood pressure. Specifically denies chest pain, palpitations, dyspnea, orthopnea, PND or peripheral edema. Blood pressure readings have been in normal range. Current medication regimen is as listed below. Patient denies any side effects of medication.     RENAL INSUFFICIENCY - Patient has a longstanding history of moderate-severe chronic renal insufficiency. Last Cr 1.33 on 3/11/22      CARDIAC:  First degree AVB.       Review of Systems  ENT/MOUTH: NEGATIVE for ear, mouth and throat problems  RESP: NEGATIVE for significant cough or SOB  CV: NEGATIVE for chest pain, palpitations or peripheral edema       Past Medical History:   Diagnosis Date     AAA (abdominal aortic aneurysm) (H)      Arthritis      CKD (chronic kidney disease) stage 2, GFR 60-89 ml/min 10/1/2014     Controlled type 2 diabetes mellitus with microalbuminuria or microproteinuria 10/1/2014     DM type 2 (diabetes mellitus, type 2) (H) 2/2001     GERD (gastroesophageal reflux disease)      History of basal cell cancer 4/26/2019     HTN (hypertension) 1976     Hypercalcemia 3/13/2015    From chlorthalidone.     Hyperlipidemia      Hypertension goal BP (blood pressure) < 140/90 1/20/2011     Impotence     Partial / ED     Lumbar spinal stenosis      Nonsenile cataract      NONSPECIFIC MEDICAL HISTORY     Increased Uric Acid Level, no gout     Obesity      NORMA (obstructive sleep apnea) 3/18/2011     NORMA (obstructive sleep apnea)      Past Surgical History:   Procedure Laterality Date     BIOPSY       COLONOSCOPY       OPTICAL TRACKING SYSTEM FUSION POSTERIOR SPINE LUMBAR  4/29/2013    Procedure: OPTICAL TRACKING SYSTEM FUSION SPINE POSTERIOR LUMBAR ONE LEVEL;  Lumbar 4-5 Laminectomy, Decompression; Lumbar 4-5 Transforaminal Lumbar Interbody Fusion ;  Surgeon: Saurav Valdez  Waldo Rodrigues MD;  Location: [facility] OR     Current Outpatient Medications   Medication Sig Dispense Refill     allopurinol (ZYLOPRIM) 100 MG tablet TAKE 1 TABLET (100 MG) BY MOUTH 2 TIMES DAILY 180 tablet 3     ammonium lactate (LAC-HYDRIN) 12 % external cream Apply topically 2 times daily as needed for dry skin 385 g 3     aspirin 81 MG tablet Take 1 tablet by mouth daily.       blood glucose (GOOD CONTOUR) test strip 1 strip by In Vitro route daily Use to test blood sugars  daily or as directed. 1 Box 11     blood glucose (NO BRAND SPECIFIED) lancets standard Use to test blood sugar 1 times daily or as directed. 100 each 3     blood glucose (NO BRAND SPECIFIED) test strip Use to test blood sugar 1 times daily or as directed. One touch or per patient's insurance 100 strip 1     blood glucose monitoring (ONE TOUCH ULTRA 2) meter device kit Use to test blood sugar 1 times daily or as directed. One touch or per patient's insurance 1 kit 0     chlorthalidone (HYGROTON) 25 MG tablet Take 1 tablet (25 mg) by mouth daily 90 tablet 4     diltiazem ER COATED BEADS (CARDIZEM LA) 240 MG 24 hr tablet Take 1 tablet (240 mg) by mouth daily 90 tablet 4     Glucosamine-Chondroit-Vit C-Mn (GLUCOSAMINE-CHONDROITIN MAX ST PO) Take by mouth 2 times daily 1500/1103mg       lisinopril (ZESTRIL) 40 MG tablet Take 1 tablet (40 mg) by mouth daily 90 tablet 4     metFORMIN (GLUCOPHAGE) 500 MG tablet Take 1 tablet (500 mg) by mouth 2 times daily (with meals) 180 tablet 3     mometasone (ELOCON) 0.1 % external cream Apply sparingly to affected area twice daily as needed.  Do not apply to face. 45 g 3     pravastatin (PRAVACHOL) 80 MG tablet Take 1 tablet (80 mg) by mouth daily 90 tablet 3     vitamin C (ASCORBIC ACID) 250 MG tablet Take 250 mg by mouth daily       Vitamin D3 (CHOLECALCIFEROL) 25 mcg (1000 units) tablet Take 25 mcg by mouth daily       vitamin E 400 units TABS Take 400 Units by mouth daily       acetaminophen (TYLENOL) 650 MG  CR tablet Take 1 tablet (650 mg) by mouth every 8 hours as needed for pain (Patient not taking: Reported on 3/18/2022) 60 tablet 1       Allergies   Allergen Reactions     Bee Pollen Other (See Comments)     Pollen [Pollen Extract]         Social History     Tobacco Use     Smoking status: Former Smoker     Packs/day: 0.50     Years: 10.00     Pack years: 5.00     Types: Cigars     Quit date: 1985     Years since quittin.1     Smokeless tobacco: Never Used   Substance Use Topics     Alcohol use: Yes     Alcohol/week: 5.8 standard drinks     Comment: occasional      Family History   Problem Relation Age of Onset     Heart Disease Father      Cancer Paternal Grandfather         leukemia      Heart Disease Brother      Diabetes Brother      History   Drug Use No         Objective     /82 (BP Location: Right arm, Patient Position: Sitting, Cuff Size: Adult Regular)   Pulse 77   Temp 97.6  F (36.4  C) (Oral)   Wt 98.4 kg (217 lb)   SpO2 98%   BMI 34.50 kg/m      Physical Exam  GENERAL APPEARANCE: healthy, alert and no distress  HENT: ear canals and TM's normal and nose and mouth without ulcers or lesions  RESP: lungs clear to auscultation - no rales, rhonchi or wheezes  CV: regular rate and rhythm, normal S1 S2, no S3 or S4 and no murmur, click or rub   ABDOMEN: soft, nontender, no HSM or masses and bowel sounds normal  NEURO: Normal strength and tone, sensory exam grossly normal, mentation intact and speech normal    Recent Labs   Lab Test 22  0741 21  0903 21  0734 20  0750   HGB 14.5  --   --  14.8     --   --  297    134   < > 132*   POTASSIUM 3.4 3.6   < > 3.5   CR 1.33* 1.09   < > 0.98   A1C 6.4* 6.3*   < > 5.9*    < > = values in this interval not displayed.        Diagnostics:  Recent Results (from the past 720 hour(s))   Hemoglobin A1c    Collection Time: 22  7:41 AM   Result Value Ref Range    Hemoglobin A1C 6.4 (H) 0.0 - 5.6 %   CBC with platelets     Collection Time: 03/11/22  7:41 AM   Result Value Ref Range    WBC Count 10.8 4.0 - 11.0 10e3/uL    RBC Count 4.41 4.40 - 5.90 10e6/uL    Hemoglobin 14.5 13.3 - 17.7 g/dL    Hematocrit 42.2 40.0 - 53.0 %    MCV 96 78 - 100 fL    MCH 32.9 26.5 - 33.0 pg    MCHC 34.4 31.5 - 36.5 g/dL    RDW 12.5 10.0 - 15.0 %    Platelet Count 263 150 - 450 10e3/uL   Comprehensive metabolic panel (BMP + Alb, Alk Phos, ALT, AST, Total. Bili, TP)    Collection Time: 03/11/22  7:41 AM   Result Value Ref Range    Sodium 135 133 - 144 mmol/L    Potassium 3.4 3.4 - 5.3 mmol/L    Chloride 101 94 - 109 mmol/L    Carbon Dioxide (CO2) 29 20 - 32 mmol/L    Anion Gap 5 3 - 14 mmol/L    Urea Nitrogen 25 7 - 30 mg/dL    Creatinine 1.33 (H) 0.66 - 1.25 mg/dL    Calcium 10.9 (H) 8.5 - 10.1 mg/dL    Glucose 140 (H) 70 - 99 mg/dL    Alkaline Phosphatase 37 (L) 40 - 150 U/L    AST 15 0 - 45 U/L    ALT 21 0 - 70 U/L    Protein Total 7.7 6.8 - 8.8 g/dL    Albumin 3.8 3.4 - 5.0 g/dL    Bilirubin Total 0.4 0.2 - 1.3 mg/dL    GFR Estimate 53 (L) >60 mL/min/1.73m2      No EKG required for low risk surgery (cataract, skin procedure, breast biopsy, etc).    Revised Cardiac Risk Index (RCRI):  The patient has the following serious cardiovascular risks for perioperative complications:   - No serious cardiac risks = 0 points     RCRI Interpretation: 0 points: Class I (very low risk - 0.4% complication rate)           Signed Electronically by: Kia Mcmanus MD  Copy of this evaluation report is provided to requesting physician.

## 2022-03-18 NOTE — PATIENT INSTRUCTIONS
Hold aspirin and supplements until after the surgery  Hold Metformin the day before AND and the day of  surgery      The morning of surgery, take only the diltiazem

## 2022-03-18 NOTE — PROGRESS NOTES
[facility]  [address] [place] [address]  [place] [address]  Phone: [phone]  Primary Provider: Kia Mcmanus  Pre-op Performing Provider: KIA MCMANUS      PREOPERATIVE EVALUATION:  Today's date: 3/18/2022    Zoey Romo is a 82 year old male who presents for a preoperative evaluation.    Surgical Information:  Surgery/Procedure: eyelids  Surgery Location: [place]  Surgeon:   Surgery Date: 3/21/22  Time of Surgery:   Where patient plans to recover: At home with family  Fax number for surgical facility: [phone]    Type of Anesthesia Anticipated: to be determined    Assessment & Plan     The proposed surgical procedure is considered LOW risk.    Preop general physical exam  Low risk surgery    Lid lag   will have blepharoplasty.      Type 2 diabetes mellitus with stage 3a chronic kidney disease, without long-term current use of insulin (H)   well controlled     Hypertension goal BP (blood pressure) < 130/80  Well controlled.     Primary hyperparathyroidism (H)  Educated patient today re: this condition  Will recheck at each DM f/u.               Risks and Recommendations:  The patient has the following additional risks and recommendations for perioperative complications:   - No identified additional risk factors other than previously addressed    Medication Instructions:    Hold aspirin and supplements until after the surgery  Hold Metformin the day before AND and the day of  surgery  The morning of surgery, take only the diltiazem      RECOMMENDATION:  APPROVAL GIVEN to proceed with proposed procedure, without further diagnostic evaluation.  COVID screen is pending.      I spent a total of 20 minutes on the day of the visit.   Time spent doing chart review, history and exam, documentation and further activities per the note         Subjective     HPI related to upcoming procedure: 81 y/o M here for preop.      H/o DMI, NORMA, HTN, AAA (yearly US in Feb), Primary  Hyperparathyroidism (9/22/21: normal BMD).    He will be having radha blepharoplasty on 3/21/22.       Preop Questions 3/18/2022   1. Have you ever had a heart attack or stroke? No   2. Have you ever had surgery on your heart or blood vessels, such as a stent placement, a coronary artery bypass, or surgery on an artery in your head, neck, heart, or legs? No   3. Do you have chest pain with activity? No   4. Do you have a history of  heart failure? No   5. Do you currently have a cold, bronchitis or symptoms of other infection? No   6. Do you have a cough, shortness of breath, or wheezing? No   7. Do you or anyone in your family have previous history of blood clots? No   8. Do you or does anyone in your family have a serious bleeding problem such as prolonged bleeding following surgeries or cuts? No   9. Have you ever had problems with anemia or been told to take iron pills? No   10. Have you had any abnormal blood loss such as black, tarry or bloody stools? No   11. Have you ever had a blood transfusion? No   12. Are you willing to have a blood transfusion if it is medically needed before, during, or after your surgery? Yes   13. Have you or any of your relatives ever had problems with anesthesia? No   14. Do you have sleep apnea, excessive snoring or daytime drowsiness? YES - cpap   14a. Do you have a CPAP machine? No   15. Do you have any artifical heart valves or other implanted medical devices like a pacemaker, defibrillator, or continuous glucose monitor? No   16. Do you have artificial joints? No   17. Are you allergic to latex? No     Health Care Directive:  Patient has a Health Care Directive on file      Preoperative Review of :   reviewed - no record of controlled substances prescribed.       Status of Chronic Conditions:  DIABETES - Patient has a longstanding history of DiabetesType Type II . Patient is being treated with oral agents and denies significant side effects. Control has been good.  Complicating factors include but are not limited to: neuropathy.     HYPERTENSION - Patient has longstanding history of HTN , currently denies any symptoms referable to elevated blood pressure. Specifically denies chest pain, palpitations, dyspnea, orthopnea, PND or peripheral edema. Blood pressure readings have been in normal range. Current medication regimen is as listed below. Patient denies any side effects of medication.     RENAL INSUFFICIENCY - Patient has a longstanding history of moderate-severe chronic renal insufficiency. Last Cr 1.33 on 3/11/22      CARDIAC:  First degree AVB.       Review of Systems  ENT/MOUTH: NEGATIVE for ear, mouth and throat problems  RESP: NEGATIVE for significant cough or SOB  CV: NEGATIVE for chest pain, palpitations or peripheral edema       Past Medical History:   Diagnosis Date     AAA (abdominal aortic aneurysm) (H)      Arthritis      CKD (chronic kidney disease) stage 2, GFR 60-89 ml/min 10/1/2014     Controlled type 2 diabetes mellitus with microalbuminuria or microproteinuria 10/1/2014     DM type 2 (diabetes mellitus, type 2) (H) 2/2001     GERD (gastroesophageal reflux disease)      History of basal cell cancer 4/26/2019     HTN (hypertension) 1976     Hypercalcemia 3/13/2015    From chlorthalidone.     Hyperlipidemia      Hypertension goal BP (blood pressure) < 140/90 1/20/2011     Impotence     Partial / ED     Lumbar spinal stenosis      Nonsenile cataract      NONSPECIFIC MEDICAL HISTORY     Increased Uric Acid Level, no gout     Obesity      NORMA (obstructive sleep apnea) 3/18/2011     NORMA (obstructive sleep apnea)      Past Surgical History:   Procedure Laterality Date     BIOPSY       COLONOSCOPY       OPTICAL TRACKING SYSTEM FUSION POSTERIOR SPINE LUMBAR  4/29/2013    Procedure: OPTICAL TRACKING SYSTEM FUSION SPINE POSTERIOR LUMBAR ONE LEVEL;  Lumbar 4-5 Laminectomy, Decompression; Lumbar 4-5 Transforaminal Lumbar Interbody Fusion ;  Surgeon: Saurav Valdez  Waldo Rodrigues MD;  Location: [facility] OR     Current Outpatient Medications   Medication Sig Dispense Refill     allopurinol (ZYLOPRIM) 100 MG tablet TAKE 1 TABLET (100 MG) BY MOUTH 2 TIMES DAILY 180 tablet 3     ammonium lactate (LAC-HYDRIN) 12 % external cream Apply topically 2 times daily as needed for dry skin 385 g 3     aspirin 81 MG tablet Take 1 tablet by mouth daily.       blood glucose (GOOD CONTOUR) test strip 1 strip by In Vitro route daily Use to test blood sugars  daily or as directed. 1 Box 11     blood glucose (NO BRAND SPECIFIED) lancets standard Use to test blood sugar 1 times daily or as directed. 100 each 3     blood glucose (NO BRAND SPECIFIED) test strip Use to test blood sugar 1 times daily or as directed. One touch or per patient's insurance 100 strip 1     blood glucose monitoring (ONE TOUCH ULTRA 2) meter device kit Use to test blood sugar 1 times daily or as directed. One touch or per patient's insurance 1 kit 0     chlorthalidone (HYGROTON) 25 MG tablet Take 1 tablet (25 mg) by mouth daily 90 tablet 4     diltiazem ER COATED BEADS (CARDIZEM LA) 240 MG 24 hr tablet Take 1 tablet (240 mg) by mouth daily 90 tablet 4     Glucosamine-Chondroit-Vit C-Mn (GLUCOSAMINE-CHONDROITIN MAX ST PO) Take by mouth 2 times daily 1500/1103mg       lisinopril (ZESTRIL) 40 MG tablet Take 1 tablet (40 mg) by mouth daily 90 tablet 4     metFORMIN (GLUCOPHAGE) 500 MG tablet Take 1 tablet (500 mg) by mouth 2 times daily (with meals) 180 tablet 3     mometasone (ELOCON) 0.1 % external cream Apply sparingly to affected area twice daily as needed.  Do not apply to face. 45 g 3     pravastatin (PRAVACHOL) 80 MG tablet Take 1 tablet (80 mg) by mouth daily 90 tablet 3     vitamin C (ASCORBIC ACID) 250 MG tablet Take 250 mg by mouth daily       Vitamin D3 (CHOLECALCIFEROL) 25 mcg (1000 units) tablet Take 25 mcg by mouth daily       vitamin E 400 units TABS Take 400 Units by mouth daily       acetaminophen (TYLENOL) 650 MG  CR tablet Take 1 tablet (650 mg) by mouth every 8 hours as needed for pain (Patient not taking: Reported on 3/18/2022) 60 tablet 1       Allergies   Allergen Reactions     Bee Pollen Other (See Comments)     Pollen [Pollen Extract]         Social History     Tobacco Use     Smoking status: Former Smoker     Packs/day: 0.50     Years: 10.00     Pack years: 5.00     Types: Cigars     Quit date: 1985     Years since quittin.1     Smokeless tobacco: Never Used   Substance Use Topics     Alcohol use: Yes     Alcohol/week: 5.8 standard drinks     Comment: occasional      Family History   Problem Relation Age of Onset     Heart Disease Father      Cancer Paternal Grandfather         leukemia      Heart Disease Brother      Diabetes Brother      History   Drug Use No         Objective     /82 (BP Location: Right arm, Patient Position: Sitting, Cuff Size: Adult Regular)   Pulse 77   Temp 97.6  F (36.4  C) (Oral)   Wt 98.4 kg (217 lb)   SpO2 98%   BMI 34.50 kg/m      Physical Exam  GENERAL APPEARANCE: healthy, alert and no distress  HENT: ear canals and TM's normal and nose and mouth without ulcers or lesions  RESP: lungs clear to auscultation - no rales, rhonchi or wheezes  CV: regular rate and rhythm, normal S1 S2, no S3 or S4 and no murmur, click or rub   ABDOMEN: soft, nontender, no HSM or masses and bowel sounds normal  NEURO: Normal strength and tone, sensory exam grossly normal, mentation intact and speech normal    Recent Labs   Lab Test 22  0741 21  0903 21  0734 20  0750   HGB 14.5  --   --  14.8     --   --  297    134   < > 132*   POTASSIUM 3.4 3.6   < > 3.5   CR 1.33* 1.09   < > 0.98   A1C 6.4* 6.3*   < > 5.9*    < > = values in this interval not displayed.        Diagnostics:  Recent Results (from the past 720 hour(s))   Hemoglobin A1c    Collection Time: 22  7:41 AM   Result Value Ref Range    Hemoglobin A1C 6.4 (H) 0.0 - 5.6 %   CBC with platelets     Collection Time: 03/11/22  7:41 AM   Result Value Ref Range    WBC Count 10.8 4.0 - 11.0 10e3/uL    RBC Count 4.41 4.40 - 5.90 10e6/uL    Hemoglobin 14.5 13.3 - 17.7 g/dL    Hematocrit 42.2 40.0 - 53.0 %    MCV 96 78 - 100 fL    MCH 32.9 26.5 - 33.0 pg    MCHC 34.4 31.5 - 36.5 g/dL    RDW 12.5 10.0 - 15.0 %    Platelet Count 263 150 - 450 10e3/uL   Comprehensive metabolic panel (BMP + Alb, Alk Phos, ALT, AST, Total. Bili, TP)    Collection Time: 03/11/22  7:41 AM   Result Value Ref Range    Sodium 135 133 - 144 mmol/L    Potassium 3.4 3.4 - 5.3 mmol/L    Chloride 101 94 - 109 mmol/L    Carbon Dioxide (CO2) 29 20 - 32 mmol/L    Anion Gap 5 3 - 14 mmol/L    Urea Nitrogen 25 7 - 30 mg/dL    Creatinine 1.33 (H) 0.66 - 1.25 mg/dL    Calcium 10.9 (H) 8.5 - 10.1 mg/dL    Glucose 140 (H) 70 - 99 mg/dL    Alkaline Phosphatase 37 (L) 40 - 150 U/L    AST 15 0 - 45 U/L    ALT 21 0 - 70 U/L    Protein Total 7.7 6.8 - 8.8 g/dL    Albumin 3.8 3.4 - 5.0 g/dL    Bilirubin Total 0.4 0.2 - 1.3 mg/dL    GFR Estimate 53 (L) >60 mL/min/1.73m2      No EKG required for low risk surgery (cataract, skin procedure, breast biopsy, etc).    Revised Cardiac Risk Index (RCRI):  The patient has the following serious cardiovascular risks for perioperative complications:   - No serious cardiac risks = 0 points     RCRI Interpretation: 0 points: Class I (very low risk - 0.4% complication rate)           Signed Electronically by: Kia Mcmanus MD  Copy of this evaluation report is provided to requesting physician.

## 2022-03-21 ENCOUNTER — HOSPITAL ENCOUNTER (OUTPATIENT)
Facility: AMBULATORY SURGERY CENTER | Age: 82
Discharge: HOME | End: 2022-03-21
Attending: OPHTHALMOLOGY | Admitting: OPHTHALMOLOGY
Payer: COMMERCIAL

## 2022-03-21 ENCOUNTER — ANESTHESIA (OUTPATIENT)
Dept: SURGERY | Facility: AMBULATORY SURGERY CENTER | Age: 82
End: 2022-03-21
Payer: COMMERCIAL

## 2022-03-21 VITALS
TEMPERATURE: 97.2 F | DIASTOLIC BLOOD PRESSURE: 85 MMHG | SYSTOLIC BLOOD PRESSURE: 175 MMHG | OXYGEN SATURATION: 99 % | RESPIRATION RATE: 16 BRPM | HEART RATE: 67 BPM

## 2022-03-21 DIAGNOSIS — H02.403 INVOLUTIONAL PTOSIS, ACQUIRED, BILATERAL: ICD-10-CM

## 2022-03-21 LAB — GLUCOSE BLDC GLUCOMTR-MCNC: 147 MG/DL (ref 70–99)

## 2022-03-21 PROCEDURE — 15823 BLEPHARP UPR EYELID XCSV SKN: CPT | Mod: 50 | Performed by: OPHTHALMOLOGY

## 2022-03-21 PROCEDURE — G8907 PT DOC NO EVENTS ON DISCHARG: HCPCS

## 2022-03-21 PROCEDURE — G8918 PT W/O PREOP ORDER IV AB PRO: HCPCS

## 2022-03-21 PROCEDURE — 67908 REPAIR EYELID DEFECT: CPT | Mod: E1

## 2022-03-21 PROCEDURE — 82962 GLUCOSE BLOOD TEST: CPT | Performed by: OPHTHALMOLOGY

## 2022-03-21 RX ORDER — ONDANSETRON 4 MG/1
4 TABLET, ORALLY DISINTEGRATING ORAL EVERY 30 MIN PRN
Status: DISCONTINUED | OUTPATIENT
Start: 2022-03-21 | End: 2022-03-22 | Stop reason: HOSPADM

## 2022-03-21 RX ORDER — ERYTHROMYCIN 5 MG/G
OINTMENT OPHTHALMIC
Qty: 3.5 G | Refills: 0 | Status: SHIPPED | OUTPATIENT
Start: 2022-03-21 | End: 2022-04-13

## 2022-03-21 RX ORDER — SODIUM CHLORIDE, SODIUM LACTATE, POTASSIUM CHLORIDE, CALCIUM CHLORIDE 600; 310; 30; 20 MG/100ML; MG/100ML; MG/100ML; MG/100ML
INJECTION, SOLUTION INTRAVENOUS CONTINUOUS
Status: DISCONTINUED | OUTPATIENT
Start: 2022-03-21 | End: 2022-03-22 | Stop reason: HOSPADM

## 2022-03-21 RX ORDER — PROPOFOL 10 MG/ML
INJECTION, EMULSION INTRAVENOUS CONTINUOUS PRN
Status: DISCONTINUED | OUTPATIENT
Start: 2022-03-21 | End: 2022-03-21

## 2022-03-21 RX ORDER — LIDOCAINE 40 MG/G
CREAM TOPICAL
Status: DISCONTINUED | OUTPATIENT
Start: 2022-03-21 | End: 2022-03-22 | Stop reason: HOSPADM

## 2022-03-21 RX ORDER — METOPROLOL TARTRATE 1 MG/ML
1-2 INJECTION, SOLUTION INTRAVENOUS EVERY 5 MIN PRN
Status: DISCONTINUED | OUTPATIENT
Start: 2022-03-21 | End: 2022-03-22 | Stop reason: HOSPADM

## 2022-03-21 RX ORDER — ACETAMINOPHEN 325 MG/1
975 TABLET ORAL ONCE
Status: COMPLETED | OUTPATIENT
Start: 2022-03-21 | End: 2022-03-21

## 2022-03-21 RX ORDER — NEOMYCIN SULFATE, POLYMYXIN B SULFATE AND DEXAMETHASONE 3.5; 10000; 1 MG/ML; [USP'U]/ML; MG/ML
SUSPENSION/ DROPS OPHTHALMIC
Qty: 5 ML | Refills: 0 | Status: SHIPPED | OUTPATIENT
Start: 2022-03-21 | End: 2022-04-13

## 2022-03-21 RX ORDER — OXYCODONE HYDROCHLORIDE 5 MG/1
5 TABLET ORAL EVERY 4 HOURS PRN
Status: DISCONTINUED | OUTPATIENT
Start: 2022-03-21 | End: 2022-03-22 | Stop reason: HOSPADM

## 2022-03-21 RX ORDER — FENTANYL CITRATE 50 UG/ML
25 INJECTION, SOLUTION INTRAMUSCULAR; INTRAVENOUS EVERY 5 MIN PRN
Status: DISCONTINUED | OUTPATIENT
Start: 2022-03-21 | End: 2022-03-22 | Stop reason: HOSPADM

## 2022-03-21 RX ORDER — PROPOFOL 10 MG/ML
INJECTION, EMULSION INTRAVENOUS PRN
Status: DISCONTINUED | OUTPATIENT
Start: 2022-03-21 | End: 2022-03-21

## 2022-03-21 RX ORDER — FENTANYL CITRATE 50 UG/ML
25 INJECTION, SOLUTION INTRAMUSCULAR; INTRAVENOUS
Status: DISCONTINUED | OUTPATIENT
Start: 2022-03-21 | End: 2022-03-22 | Stop reason: HOSPADM

## 2022-03-21 RX ORDER — ONDANSETRON 2 MG/ML
4 INJECTION INTRAMUSCULAR; INTRAVENOUS EVERY 30 MIN PRN
Status: DISCONTINUED | OUTPATIENT
Start: 2022-03-21 | End: 2022-03-22 | Stop reason: HOSPADM

## 2022-03-21 RX ORDER — TETRACAINE HYDROCHLORIDE 5 MG/ML
SOLUTION OPHTHALMIC PRN
Status: DISCONTINUED | OUTPATIENT
Start: 2022-03-21 | End: 2022-03-21 | Stop reason: HOSPADM

## 2022-03-21 RX ORDER — ERYTHROMYCIN 5 MG/G
OINTMENT OPHTHALMIC PRN
Status: DISCONTINUED | OUTPATIENT
Start: 2022-03-21 | End: 2022-03-21 | Stop reason: HOSPADM

## 2022-03-21 RX ORDER — FENTANYL CITRATE 50 UG/ML
INJECTION, SOLUTION INTRAMUSCULAR; INTRAVENOUS PRN
Status: DISCONTINUED | OUTPATIENT
Start: 2022-03-21 | End: 2022-03-21

## 2022-03-21 RX ORDER — MEPERIDINE HYDROCHLORIDE 25 MG/ML
12.5 INJECTION INTRAMUSCULAR; INTRAVENOUS; SUBCUTANEOUS
Status: DISCONTINUED | OUTPATIENT
Start: 2022-03-21 | End: 2022-03-22 | Stop reason: HOSPADM

## 2022-03-21 RX ADMIN — PROPOFOL 50 MCG/KG/MIN: 10 INJECTION, EMULSION INTRAVENOUS at 11:09

## 2022-03-21 RX ADMIN — PROPOFOL 30 MG: 10 INJECTION, EMULSION INTRAVENOUS at 11:06

## 2022-03-21 RX ADMIN — ACETAMINOPHEN 975 MG: 325 TABLET ORAL at 09:23

## 2022-03-21 RX ADMIN — PROPOFOL 40 MG: 10 INJECTION, EMULSION INTRAVENOUS at 11:04

## 2022-03-21 RX ADMIN — FENTANYL CITRATE 25 MCG: 50 INJECTION, SOLUTION INTRAMUSCULAR; INTRAVENOUS at 11:10

## 2022-03-21 RX ADMIN — FENTANYL CITRATE 25 MCG: 50 INJECTION, SOLUTION INTRAMUSCULAR; INTRAVENOUS at 11:00

## 2022-03-21 RX ADMIN — SODIUM CHLORIDE, SODIUM LACTATE, POTASSIUM CHLORIDE, CALCIUM CHLORIDE: 600; 310; 30; 20 INJECTION, SOLUTION INTRAVENOUS at 09:30

## 2022-03-21 ASSESSMENT — ENCOUNTER SYMPTOMS: DYSRHYTHMIAS: 1

## 2022-03-21 ASSESSMENT — LIFESTYLE VARIABLES: TOBACCO_USE: 1

## 2022-03-21 NOTE — OR NURSING
Nasal swabs not available for preop order for swabs- preop h N p MD states EKG not indicated for procedure.

## 2022-03-21 NOTE — INTERVAL H&P NOTE
"I have reviewed the surgical (or preoperative) H&P that is linked to this encounter, and examined the patient. There are no significant changes    Clinical Conditions Present on Arrival:  SECTIONPRESENTONADMISSIONBEGIN@           # Hypercalcemia: Ca = N/A and/or iCa = N/A on admission, will monitor as appropriate       # Platelet Defect: home medication list includes an antiplatelet medication   # Obesity: Estimated body mass index is 34.5 kg/m  as calculated from the following:    Height as of 3/11/22: 1.689 m (5' 6.5\").    Weight as of 3/18/22: 98.4 kg (217 lb).       "

## 2022-03-21 NOTE — DISCHARGE INSTRUCTIONS
Post-operative Instructions  Ophthalmic Plastic and Reconstructive Surgery    Wilson Scruggs M.D.     All instructions apply to the operated eye(s) or eyelid(s).    Wound care and personal care  ? Apply ice compresses 15 minutes of every hour while awake for 2 days. As long as there is no further bleeding, after two days, switch to warm water compresses for five minutes, four times a day until seen by your physician.   ? You may shower or wash your hair the day after surgery. Do not go swimming for at least 2 weeks to prevent contamination of your wounds.  ? You may go for walks and light activity is ok, but no heavy (over 15 pounds) lifting, bending or excessive straining for one week.   ? Do not apply make-up to the eyes or eyelids for 2 weeks after surgery.  ? Expect some swelling, bruising, black eye (even into the lower eyelids and cheeks). Also expect serum caking, crusting and discharge from the eye and/or incisions. A small amount of surface bleeding, and depending on the type of surgery, bleeding from the inside of the eyelid, is normal for the first 48 hours.  ? Avoid straining, bending at the waist, or lifting more than 15 pounds for 1 week. Sleeping with your head elevated, such as in a recliner, for the first several days can help swelling resolve more quickly.   ? Do continue to ambulate (walk) as you normally would - being sedentary after surgery can cause blood clots.   ? Your eye(s) and eyelid(s) may be painful and tender. This is normal after surgery.      Contact information and follow-up  ? Return to the Eye Clinic for a follow-up appointment with your physician as scheduled. If no appointment has been scheduled:   - [facility] eye clinic: [phone] for an appointment with Dr. Scruggs within 2 to 3 weeks from your date of surgery.      -  [facility] eye clinic: [phone] for an appointment with Dr. Scruggs within 2 to 3 weeks from your date of surgery.      ? For severe pain, bleeding, or loss of vision, call the [facility] at [phone] or [facility] at [phone].     After hours or on weekends and holidays, call [phone] and ask to speak with the ophthalmologist on call.    An on call person can be reached after hours for concerns. The on call doctor should not call in medication refill requests after hours or on weekends, so please plan accordingly. An effort has been made to provide adequate pain medications following every surgery, and refills will not be provided in most instances.     Medications  ? Restart all regular home medications and eye drops. If you take Plavix or Aspirin on a regular basis, wait for 72 hours after your surgery before restarting these in order to decrease the risk of bleeding complications.  ? Avoid aspirin and aspirin-like medications (Motrin, Aleve, Ibuprofen, [place] etc) for 72 hours to reduce the risk of bleeding. You may take Tylenol (acetaminophen) for pain.  ? In addition to your home medications, take the following post-operative medications as prescribed by your physician.    ? Apply antibiotic ointment to all sutures three times a day, and into the operated eye(s) at night.  ? Instill eye drops 3 times a day for 10 days.     [facility]   Adult Discharge Orders & Instructions     For 24 hours after surgery    1. Get plenty of rest.  A responsible adult must stay with you for at least 24 hours after you leave the hospital.   2. Do not drive or use heavy equipment.  If you have weakness or tingling, don't drive or use heavy equipment until this feeling goes away.  3. Do not drink alcohol.  4. Avoid strenuous or risky activities.  Ask for help when climbing stairs.   5. You may feel lightheaded.  IF so, sit for a few minutes before standing.  Have someone help you get up.   6. If you have nausea (feel sick to your stomach): Drink only clear liquids such as  apple juice, ginger ale, broth or 7-Up.  Rest may also help.  Be sure to drink enough fluids.  Move to a regular diet as you feel able.  7. You may have a slight fever. Call the doctor if your fever is over 100 F (37.7 C) (taken under the tongue) or lasts longer than 24 hours.  8. You may have a dry mouth, a sore throat, muscle aches or trouble sleeping.  These should go away after 24 hours.  9. Do not make important or legal decisions.     Call your doctor for any of the followin.  Signs of infection (fever, growing tenderness at the surgery site, a large amount of drainage or bleeding, severe pain, foul-smelling drainage, redness, swelling).    2. It has been over 8 to 10 hours since surgery and you are still not able to urinate (pass water).    3.  Headache for over 24 hours.    4.  Numbness, tingling or weakness the day after surgery (if you had spinal anesthesia).                  5. Signs of Covid-19 infection (temperature over 100 degrees, shortness of breath, cough, loss of taste/smell, generalized body aches, persistent headache,                  chills, sore throat, nausea/vomiting/diarrhea).    To contact Dr Scruggs call:  [phone] - Day  [phone] - After Hours, ask for the Opthomologist on call    Had Tylenol at 9:30 am today. Next dose after 3:30pm today.

## 2022-03-21 NOTE — ANESTHESIA PREPROCEDURE EVALUATION
Anesthesia Pre-Procedure Evaluation    Patient: Zoey Romo   MRN: [id number] : 1940        Procedure : Procedure(s):  both upper eyelid and ptosis repair          Past Medical History:   Diagnosis Date     AAA (abdominal aortic aneurysm) (H)      Arthritis      CKD (chronic kidney disease) stage 2, GFR 60-89 ml/min 10/1/2014     Controlled type 2 diabetes mellitus with microalbuminuria or microproteinuria 10/1/2014     DM type 2 (diabetes mellitus, type 2) (H) 2001     GERD (gastroesophageal reflux disease)      History of basal cell cancer 2019     HTN (hypertension) 1976     Hypercalcemia 3/13/2015    From chlorthalidone.     Hyperlipidemia      Hypertension goal BP (blood pressure) < 140/90 2011     Impotence     Partial / ED     Lumbar spinal stenosis      Nonsenile cataract      NONSPECIFIC MEDICAL HISTORY     Increased Uric Acid Level, no gout     Obesity      NORMA (obstructive sleep apnea) 3/18/2011     NORMA (obstructive sleep apnea)       Past Surgical History:   Procedure Laterality Date     BIOPSY       COLONOSCOPY       OPTICAL TRACKING SYSTEM FUSION POSTERIOR SPINE LUMBAR  2013    Procedure: OPTICAL TRACKING SYSTEM FUSION SPINE POSTERIOR LUMBAR ONE LEVEL;  Lumbar 4-5 Laminectomy, Decompression; Lumbar 4-5 Transforaminal Lumbar Interbody Fusion ;  Surgeon: Saurav Valdez MD;  Location: [facility] OR      Allergies   Allergen Reactions     Bee Pollen Other (See Comments)     Pollen [Pollen Extract]       Social History     Tobacco Use     Smoking status: Former Smoker     Packs/day: 0.50     Years: 10.00     Pack years: 5.00     Types: Cigars     Quit date: 1985     Years since quittin.1     Smokeless tobacco: Never Used   Substance Use Topics     Alcohol use: Yes     Alcohol/week: 5.8 standard drinks     Comment: occasional       Wt Readings from Last 1 Encounters:   22 98.4 kg (217 lb)        Anesthesia Evaluation   Pt has had prior anesthetic. Type:  General and MAC.    No history of anesthetic complications       ROS/MED HX  ENT/Pulmonary:     (+) sleep apnea, uses CPAP, tobacco use, Past use,     Neurologic:       Cardiovascular: Comment: ABDOMINAL AORTIC ANEURYSM    (+) Dyslipidemia hypertension-Peripheral Vascular Disease----dysrhythmias, 1st Deg Heart Block,     METS/Exercise Tolerance:     Hematologic:  - neg hematologic  ROS     Musculoskeletal: Comment: Lumbar stenosis  (+) arthritis,     GI/Hepatic:     (+) GERD, Asymptomatic on medication,     Renal/Genitourinary:     (+) renal disease, type: CRI, Pt does not require dialysis,     Endo: Comment: Primary hyperparathyroidism, hypercalcemia, gout    (+) type II DM, Last HgA1c: 6.4, date: 1/2022, Obesity,     Psychiatric/Substance Use:  - neg psychiatric ROS     Infectious Disease:  - neg infectious disease ROS     Malignancy:  - neg malignancy ROS     Other:            Physical Exam    Airway  airway exam normal      Mallampati: II   TM distance: > 3 FB   Neck ROM: full   Mouth opening: > 3 cm    Respiratory Devices and Support         Dental       (+) implants    B=Bridge, C=Chipped, L=Loose, M=Missing    Cardiovascular   cardiovascular exam normal       Rhythm and rate: regular and normal     Pulmonary   pulmonary exam normal                OUTSIDE LABS:  CBC:   Lab Results   Component Value Date    WBC 10.8 03/11/2022    WBC 11.0 09/17/2020    HGB 14.5 03/11/2022    HGB 14.8 09/17/2020    HCT 42.2 03/11/2022    HCT 43.8 09/17/2020     03/11/2022     09/17/2020     BMP:   Lab Results   Component Value Date     03/11/2022     09/01/2021    POTASSIUM 3.4 03/11/2022    POTASSIUM 3.6 09/01/2021    CHLORIDE 101 03/11/2022    CHLORIDE 100 09/01/2021    CO2 29 03/11/2022    CO2 26 09/01/2021    BUN 25 03/11/2022    BUN 20 09/01/2021    CR 1.33 (H) 03/11/2022    CR 1.09 09/01/2021     (H) 03/11/2022     (H) 09/01/2021     COAGS:   Lab Results   Component Value Date    PTT  31 04/17/2013    INR 1.00 04/17/2013     POC:   Lab Results   Component Value Date     (H) 05/01/2013     HEPATIC:   Lab Results   Component Value Date    ALBUMIN 3.8 03/11/2022    PROTTOTAL 7.7 03/11/2022    ALT 21 03/11/2022    AST 15 03/11/2022    ALKPHOS 37 (L) 03/11/2022    BILITOTAL 0.4 03/11/2022     OTHER:   Lab Results   Component Value Date    A1C 6.4 (H) 03/11/2022    LESLIE 10.9 (H) 03/11/2022    TSH 2.68 09/17/2020       Anesthesia Plan    ASA Status:  2   NPO Status:  NPO Appropriate    Anesthesia Type: MAC.     - Reason for MAC: immobility needed, straight local not clinically adequate, chronic cardiopulmonary disease   Induction: Intravenous, Propofol.   Maintenance: TIVA.        Consents    Anesthesia Plan(s) and associated risks, benefits, and realistic alternatives discussed. Questions answered and patient/representative(s) expressed understanding.    - Discussed:     - Discussed with:  Patient      - Extended Intubation/Ventilatory Support Discussed: No.      - Patient is DNR/DNI Status: No    Use of blood products discussed: No .     Postoperative Care    Pain management: IV analgesics, Oral pain medications.   PONV prophylaxis: Ondansetron (or other 5HT-3)     Comments:    Other Comments: MAC, sedation, GA as back up, standard ASA monitors  All pertinent results and records reviewed, risks, included but not limited to hypoventilation, hypoxemia, laryngo/bronchospasm, N/V, intraoperative awareness due to sedation only d/w patient, all questions, concerns addressed            Marion Amos MD

## 2022-03-21 NOTE — OP NOTE
PREOPERATIVE DIAGNOSIS:   1. Bilateral upper eyelid dermatochalasis.   2. Bilateral upper eyelid ptosis.   POSTOPERATIVE DIAGNOSES:   1. Bilateral upper eyelid dermatochalasis.   2. Bilateral upper eyelid ptosis.   PROCEDURE PERFORMED:   1. Bilateral upper blepharoplasty.   2. Bilateral upper eyelid ptosis repair by Bobo's muscle conjunctival resection 9 mm right and 8 mm left.   SURGEON: Wilson Scruggs MD  ASSISTANT: None  ANESTHESIA: Monitored with local infiltration of a 50/50 mixture of 2% lidocaine with epinephrine and 0.5% Marcaine.   COMPLICATIONS: None.   ESTIMATED BLOOD LOSS: Less than 5 mL.   HISTORY: Zoey Romo  presented with upper lid drooping interfering with his superior visual field and activities of daily living. After the risks, benefits and alternatives to the proposed procedure were explained, informed consent was obtained.   DESCRIPTION OF PROCEDURE: Zoey Romo  was brought to the operating room and placed supine on the operating table. IV sedation was given. The upper eyelid crease and excess upper eyelid skin was marked with a marking pen and infiltrated with local anesthetic.  The area was prepped and draped in the typical sterile ophthalmic fashion. Attention was directed to the right side. Skin was incised following marked lines. Skin and orbicularis muscle flap were excised with high temperature cautery. Nasal and central fat were conservatively debulked. A 4-0 silk suture was placed to the eyelid margin and the eyelid everted over a [place] retractor. A 6-0 silk suture was threaded half the desired distance from the superior tarsal border and used to elevate the conjunctiva and Bobo's muscle which was clamped with a Putterman clamp. A 6-0 plain gut suture was run in a horizontal mattress fashion 1 mm below the clamp. This was run from lateral to medial and then medial to lateral and the clamped tissues were excised with a 15 blade. The suture was externalized  through the blepharoplasty incision and tied in a permanent fashion. The blepharoplasty incision was closed with running 6-0 plain gut suture. The same procedure was performed on the left side.The patient tolerated the procedure well. Antibiotic ointment was applied. Zoey Romo left the operating room in stable condition.   Wilson Scruggs MD

## 2022-03-21 NOTE — ANESTHESIA POSTPROCEDURE EVALUATION
Patient: Zoey Romo    Procedure: Procedure(s):  both upper eyelid and ptosis repair  Blepharoplasty bilateral       Anesthesia Type:  MAC    Note:  Disposition: Outpatient   Postop Pain Control: Uneventful            Sign Out: Well controlled pain   PONV: No   Neuro/Psych: Uneventful            Sign Out: Acceptable/Baseline neuro status   Airway/Respiratory: Uneventful            Sign Out: Acceptable/Baseline resp. status   CV/Hemodynamics: Uneventful            Sign Out: Acceptable CV status; No obvious hypovolemia; No obvious fluid overload   Other NRE: NONE   DID A NON-ROUTINE EVENT OCCUR?            Last vitals:  Vitals Value Taken Time   /85 03/21/22 1213   Temp 36.2  C (97.2  F) 03/21/22 1213   Pulse 67 03/21/22 1213   Resp 16 03/21/22 1213   SpO2 99 % 03/21/22 1213       Electronically Signed By: Marion Amos MD  March 21, 2022  12:23 PM

## 2022-03-21 NOTE — ANESTHESIA CARE TRANSFER NOTE
Patient: Zoey Romo    Procedure: Procedure(s):  both upper eyelid and ptosis repair  Blepharoplasty bilateral       Diagnosis: Dermatochalasis of both upper eyelids [H02.831, H02.834]  Involutional ptosis, acquired, bilateral [H02.403]  Diagnosis Additional Information: No value filed.    Anesthesia Type:   MAC     Note:    Oropharynx: spontaneously breathing  Level of Consciousness: awake  Oxygen Supplementation: room air    Independent Airway: airway patency satisfactory and stable  Dentition: dentition unchanged  Vital Signs Stable: post-procedure vital signs reviewed and stable  Report to RN Given: handoff report given  Patient transferred to: Phase II    Handoff Report: Identifed the Patient, Identified the Reponsible Provider, Reviewed the pertinent medical history, Discussed the surgical course, Reviewed Intra-OP anesthesia mangement and issues during anesthesia, Set expectations for post-procedure period and Allowed opportunity for questions and acknowledgement of understanding      Vitals:  Vitals Value Taken Time   BP     Temp     Pulse     Resp     SpO2         Electronically Signed By: FAWAD Small CRNA  March 21, 2022  11:52 AM  
[see HPI] : see HPI

## 2022-04-13 ENCOUNTER — OFFICE VISIT (OUTPATIENT)
Dept: OPHTHALMOLOGY | Facility: CLINIC | Age: 82
End: 2022-04-13
Payer: COMMERCIAL

## 2022-04-13 DIAGNOSIS — H02.403 INVOLUTIONAL PTOSIS, ACQUIRED, BILATERAL: ICD-10-CM

## 2022-04-13 DIAGNOSIS — H02.834 DERMATOCHALASIS OF BOTH UPPER EYELIDS: Primary | ICD-10-CM

## 2022-04-13 DIAGNOSIS — H02.831 DERMATOCHALASIS OF BOTH UPPER EYELIDS: Primary | ICD-10-CM

## 2022-04-13 PROCEDURE — 99024 POSTOP FOLLOW-UP VISIT: CPT | Performed by: OPHTHALMOLOGY

## 2022-04-13 ASSESSMENT — VISUAL ACUITY
OD_CC: 20/30
OS_CC: 20/25
OD_CC+: -1
METHOD: SNELLEN - LINEAR
OS_CC+: -2

## 2022-04-13 ASSESSMENT — TONOMETRY
OS_IOP_MMHG: 18
IOP_METHOD: ICARE
OD_IOP_MMHG: 21

## 2022-04-13 NOTE — NURSING NOTE
Chief Complaints and History of Present Illnesses   Patient presents with     Post Op (Ophthalmology) Both Eyes       Chief Complaint(s) and History of Present Illness(es)     Post Op (Ophthalmology) Both Eyes     Laterality: both eyes              Comments     S/P 1. Bilateral upper blepharoplasty, 2. Bilateral upper eyelid ptosis repair by Bobo's muscle conjunctival resection 9 mm right and 8 mm left, 03/21/2022. Patient has no concerns. Lids are healing up nicely. No longer using ana rosa or drops. Wondering if it is okay to use Systane drops as needed.                 Zacarias Ortega, Ophthalmic Assistant

## 2022-04-13 NOTE — PROGRESS NOTES
Chief Complaint(s) and History of Present Illness(es)     Post Op (Ophthalmology) Both Eyes     Laterality: both eyes              Comments     S/P 1. Bilateral upper blepharoplasty, 2. Bilateral upper eyelid ptosis   repair by Bobo's muscle conjunctival resection 9 mm right and 8 mm   left, 03/21/2022. Patient has no concerns. Lids are healing up nicely. No   longer using ana rosa or drops. Wondering if it is okay to use Systane drops as   needed.          Doing well postop bilateral upper eyelid blepharoplasty  and bilateral mmcr ptosis repair. Happy with outcome.    - Vaseline to incision qhs  - continue warm packs  - artificial tears.   Attending Physician Attestation: Complete documentation of historical and exam elements from today's encounter can be found in the full encounter summary report (not reduplicated in this progress note). I personally obtained the chief complaint(s) and history of present illness. I confirmed and edited as necessary the review of systems, past medical/surgical history, family history, social history, and examination findings as documented by others; and I examined the patient myself. I personally reviewed the relevant tests, images, and reports as documented above. I formulated and edited as necessary the assessment and plan and discussed the findings and management plan with the patient and family. I personally reviewed the ophthalmic test(s) associated with this encounter, agree with the interpretation(s) as documented by the resident/fellow, and have edited the corresponding report(s) as necessary. Wilson Scruggs MD

## 2022-04-18 ENCOUNTER — IMMUNIZATION (OUTPATIENT)
Dept: NURSING | Facility: CLINIC | Age: 82
End: 2022-04-18
Payer: COMMERCIAL

## 2022-04-18 PROCEDURE — 0054A COVID-19,PF,PFIZER (12+ YRS): CPT

## 2022-04-18 PROCEDURE — 91305 COVID-19,PF,PFIZER (12+ YRS): CPT

## 2022-07-07 DIAGNOSIS — L30.9 DERMATITIS: ICD-10-CM

## 2022-07-07 RX ORDER — MOMETASONE FUROATE 1 MG/G
CREAM TOPICAL
Qty: 45 G | Refills: 14 | Status: SHIPPED | OUTPATIENT
Start: 2022-07-07

## 2022-07-07 NOTE — TELEPHONE ENCOUNTER
"Requested Prescriptions   Signed Prescriptions Disp Refills    mometasone (ELOCON) 0.1 % external cream 45 g 14     Sig: APPLY SPARINGLY TO AFFECTED AREA TWICE A DAY AS NEEDED (DO NOT APPLY TO FACE)       Topical Steroids and Nonsteroidals Protocol Passed - 7/7/2022 10:05 AM        Passed - Patient is age 6 or older        Passed - Authorizing prescriber's most recent note related to this medication read.     If refill request is for ophthalmic use, please forward request to provider for approval.          Passed - High potency steroid not ordered        Passed - Recent (12 mo) or future (30 days) visit within the authorizing provider's specialty     Patient has had an office visit with the authorizing provider or a provider within the authorizing providers department within the previous 12 mos or has a future within next 30 days. See \"Patient Info\" tab in inbasket, or \"Choose Columns\" in Meds & Orders section of the refill encounter.              Passed - Medication is active on med list           Debbie Henderson RN  [facility]     "

## 2022-09-08 DIAGNOSIS — I10 HTN, GOAL BELOW 130/80: ICD-10-CM

## 2022-09-09 RX ORDER — CHLORTHALIDONE 25 MG/1
TABLET ORAL
Qty: 90 TABLET | Refills: 0 | Status: SHIPPED | OUTPATIENT
Start: 2022-09-09 | End: 2022-10-31

## 2022-09-30 DIAGNOSIS — N18.31 TYPE 2 DIABETES MELLITUS WITH STAGE 3A CHRONIC KIDNEY DISEASE, WITHOUT LONG-TERM CURRENT USE OF INSULIN (H): ICD-10-CM

## 2022-09-30 DIAGNOSIS — E11.22 TYPE 2 DIABETES MELLITUS WITH STAGE 3A CHRONIC KIDNEY DISEASE, WITHOUT LONG-TERM CURRENT USE OF INSULIN (H): ICD-10-CM

## 2022-09-30 DIAGNOSIS — I10 HYPERTENSION GOAL BP (BLOOD PRESSURE) < 130/80: ICD-10-CM

## 2022-10-01 RX ORDER — LISINOPRIL 40 MG/1
TABLET ORAL
Qty: 90 TABLET | Refills: 3 | Status: SHIPPED | OUTPATIENT
Start: 2022-10-01 | End: 2023-09-28

## 2022-10-31 ENCOUNTER — OFFICE VISIT (OUTPATIENT)
Dept: INTERNAL MEDICINE | Facility: CLINIC | Age: 82
End: 2022-10-31
Payer: COMMERCIAL

## 2022-10-31 VITALS
OXYGEN SATURATION: 98 % | DIASTOLIC BLOOD PRESSURE: 62 MMHG | HEART RATE: 61 BPM | TEMPERATURE: 97.3 F | SYSTOLIC BLOOD PRESSURE: 138 MMHG | BODY MASS INDEX: 32.91 KG/M2 | WEIGHT: 207 LBS

## 2022-10-31 DIAGNOSIS — E78.5 HYPERLIPIDEMIA LDL GOAL <100: ICD-10-CM

## 2022-10-31 DIAGNOSIS — N18.2 CKD (CHRONIC KIDNEY DISEASE) STAGE 2, GFR 60-89 ML/MIN: ICD-10-CM

## 2022-10-31 DIAGNOSIS — I10 HTN, GOAL BELOW 130/80: ICD-10-CM

## 2022-10-31 DIAGNOSIS — E11.22 TYPE 2 DIABETES MELLITUS WITH STAGE 3A CHRONIC KIDNEY DISEASE, WITHOUT LONG-TERM CURRENT USE OF INSULIN (H): Primary | ICD-10-CM

## 2022-10-31 DIAGNOSIS — Z23 HIGH PRIORITY FOR 2019-NCOV VACCINE: ICD-10-CM

## 2022-10-31 DIAGNOSIS — E21.0 PRIMARY HYPERPARATHYROIDISM (H): ICD-10-CM

## 2022-10-31 DIAGNOSIS — E11.9 TYPE 2 DIABETES MELLITUS WITHOUT COMPLICATION, WITHOUT LONG-TERM CURRENT USE OF INSULIN (H): ICD-10-CM

## 2022-10-31 DIAGNOSIS — Z23 NEED FOR PROPHYLACTIC VACCINATION AND INOCULATION AGAINST INFLUENZA: ICD-10-CM

## 2022-10-31 DIAGNOSIS — I10 HYPERTENSION GOAL BP (BLOOD PRESSURE) < 130/80: ICD-10-CM

## 2022-10-31 DIAGNOSIS — M1A.9XX0 CHRONIC GOUT WITHOUT TOPHUS, UNSPECIFIED CAUSE, UNSPECIFIED SITE: ICD-10-CM

## 2022-10-31 DIAGNOSIS — N18.31 TYPE 2 DIABETES MELLITUS WITH STAGE 3A CHRONIC KIDNEY DISEASE, WITHOUT LONG-TERM CURRENT USE OF INSULIN (H): Primary | ICD-10-CM

## 2022-10-31 DIAGNOSIS — I71.43 INFRARENAL ABDOMINAL AORTIC ANEURYSM (AAA) WITHOUT RUPTURE (H): ICD-10-CM

## 2022-10-31 LAB
ANION GAP SERPL CALCULATED.3IONS-SCNC: 9 MMOL/L (ref 3–14)
BUN SERPL-MCNC: 24 MG/DL (ref 7–30)
CALCIUM SERPL-MCNC: 10.4 MG/DL (ref 8.5–10.1)
CHLORIDE BLD-SCNC: 100 MMOL/L (ref 94–109)
CHOLEST SERPL-MCNC: 156 MG/DL
CO2 SERPL-SCNC: 27 MMOL/L (ref 20–32)
CREAT SERPL-MCNC: 1.14 MG/DL (ref 0.66–1.25)
FASTING STATUS PATIENT QL REPORTED: NORMAL
GFR SERPL CREATININE-BSD FRML MDRD: 64 ML/MIN/1.73M2
GLUCOSE BLD-MCNC: 124 MG/DL (ref 70–99)
HBA1C MFR BLD: 6 % (ref 0–5.6)
HDLC SERPL-MCNC: 53 MG/DL
LDLC SERPL CALC-MCNC: 84 MG/DL
NONHDLC SERPL-MCNC: 103 MG/DL
POTASSIUM BLD-SCNC: 3.5 MMOL/L (ref 3.4–5.3)
SODIUM SERPL-SCNC: 136 MMOL/L (ref 133–144)
TRIGL SERPL-MCNC: 95 MG/DL

## 2022-10-31 PROCEDURE — 82043 UR ALBUMIN QUANTITATIVE: CPT | Performed by: INTERNAL MEDICINE

## 2022-10-31 PROCEDURE — 0124A COVID-19,PF,PFIZER BOOSTER BIVALENT: CPT | Performed by: INTERNAL MEDICINE

## 2022-10-31 PROCEDURE — 80048 BASIC METABOLIC PNL TOTAL CA: CPT | Performed by: INTERNAL MEDICINE

## 2022-10-31 PROCEDURE — 90662 IIV NO PRSV INCREASED AG IM: CPT | Performed by: INTERNAL MEDICINE

## 2022-10-31 PROCEDURE — 36415 COLL VENOUS BLD VENIPUNCTURE: CPT | Performed by: INTERNAL MEDICINE

## 2022-10-31 PROCEDURE — 83036 HEMOGLOBIN GLYCOSYLATED A1C: CPT | Performed by: INTERNAL MEDICINE

## 2022-10-31 PROCEDURE — 99214 OFFICE O/P EST MOD 30 MIN: CPT | Mod: 25 | Performed by: INTERNAL MEDICINE

## 2022-10-31 PROCEDURE — G0008 ADMIN INFLUENZA VIRUS VAC: HCPCS | Performed by: INTERNAL MEDICINE

## 2022-10-31 PROCEDURE — 91312 COVID-19,PF,PFIZER BOOSTER BIVALENT: CPT | Performed by: INTERNAL MEDICINE

## 2022-10-31 PROCEDURE — 80061 LIPID PANEL: CPT | Performed by: INTERNAL MEDICINE

## 2022-10-31 RX ORDER — ALLOPURINOL 100 MG/1
TABLET ORAL
Qty: 180 TABLET | Refills: 3 | Status: SHIPPED | OUTPATIENT
Start: 2022-10-31 | End: 2023-05-16

## 2022-10-31 RX ORDER — CHLORTHALIDONE 25 MG/1
25 TABLET ORAL DAILY
Qty: 90 TABLET | Refills: 3 | Status: SHIPPED | OUTPATIENT
Start: 2022-10-31 | End: 2023-12-11

## 2022-10-31 RX ORDER — PRAVASTATIN SODIUM 80 MG/1
80 TABLET ORAL DAILY
Qty: 90 TABLET | Refills: 3 | Status: SHIPPED | OUTPATIENT
Start: 2022-10-31 | End: 2023-11-03

## 2022-10-31 NOTE — PROGRESS NOTES
Assessment & Plan     Type 2 diabetes mellitus with stage 3a chronic kidney disease, without long-term current use of insulin (H)  Well controlled.   Reduce Metformin to ONE daily  - Hemoglobin A1c = 6.0 today  - Albumin Random Urine Quantitative with Creat Ratio; Future  - Hemoglobin A1c  - Albumin Random Urine Quantitative with Creat Ratio  Type 2 diabetes mellitus without complication, without long-term current use of insulin (H)  Reduce to once daily   - metFORMIN (GLUCOPHAGE) 500 MG tablet; Take 1 tablet (500 mg) by mouth daily (with dinner)    Infrarenal abdominal aortic aneurysm (AAA) without rupture    Due for surveillance   - US Abdominal Aorta Imaging; Future    Hypertension goal BP (blood pressure) < 130/80  Controlled.    - Basic metabolic panel; Future  - Basic metabolic panel    Primary hyperparathyroidism (H)   calcium surveillance  Normal dexa   No h/o kidney stones.    - Basic metabolic panel; Future  - Basic metabolic panel    CKD (chronic kidney disease) stage 2, GFR 60-89 ml/min   stable.   - Basic metabolic panel; Future  - Basic metabolic panel    Hyperlipidemia LDL goal <100     - Lipid panel reflex to direct LDL Fasting; Future  - pravastatin (PRAVACHOL) 80 MG tablet; Take 1 tablet (80 mg) by mouth daily  - Lipid panel reflex to direct LDL Fasting    Chronic gout without tophus, unspecified cause, unspecified site   due for refill   - allopurinol (ZYLOPRIM) 100 MG tablet; TAKE 1 TABLET (100 MG) BY MOUTH 2 TIMES DAILY    HTN, goal below 130/80   refill rx   - chlorthalidone (HYGROTON) 25 MG tablet; Take 1 tablet (25 mg) by mouth daily  - diltiazem ER COATED BEADS (CARDIZEM LA) 240 MG 24 hr tablet; Take 1 tablet (240 mg) by mouth daily      Need for prophylactic vaccination and inoculation against influenza     - INFLUENZA, QUAD, HIGH DOSE, PF, 65YR + (FLUZONE HD)    High priority for 2019-nCoV vaccine     - COVID-19,PF,PFIZER BOOSTER BIVALENT 12+Yrs      I spent a total of 30 minutes on the  "day of the visit.   Time spent doing chart review, history and exam, documentation and further activities per the note        BMI:   Estimated body mass index is 32.91 kg/m  as calculated from the following:    Height as of 3/11/22: 1.689 m (5' 6.5\").    Weight as of this encounter: 93.9 kg (207 lb).            Return in about 6 months (around 4/30/2023) for Physical Exam, Lab Work, diabetes follow up.    Kia Mcmanus MD  [facility]    =======================================================  =========================================================    Subjective   Zoey is a 82 year old, presenting for the following health issues:  Diabetes    83 y/o M here for DM f/u       H/o DMI, NORMA, HTN, Infrarenal AAA (yearly US), Primary Hyperparathyroidism (9/22/21: normal BMD).  He has lost weight, he attributes to changing to two meals a day.         HPI     Diabetes Follow-up    How often are you checking your blood sugar? One time daily  What time of day are you checking your blood sugars (select all that apply)?     Have you had any blood sugars above 200?  No  Have you had any blood sugars below 70?  No    What symptoms do you notice when your blood sugar is low?  None and      What concerns do you have today about your diabetes? None     Do you have any of these symptoms? (Select all that apply)  Numbness in feet      BP Readings from Last 2 Encounters:   03/21/22 (!) 175/85   03/18/22 136/82     Hemoglobin A1C (%)   Date Value   03/11/2022 6.4 (H)   09/01/2021 6.3 (H)   03/03/2021 6.6 (H)   09/17/2020 5.9 (H)     LDL Cholesterol Calculated (mg/dL)   Date Value   09/01/2021 70   09/17/2020 66   04/26/2019 63            How many servings of fruits and vegetables do you eat daily?  0-1    On average, how many sweetened beverages do you drink each day (Examples: soda, juice, sweet tea, etc.  Do NOT count diet or artificially sweetened beverages)?   0    How many days per week do you " exercise enough to make your heart beat faster? 3 or less    How many minutes a day do you exercise enough to make your heart beat faster? 9 or less    How many days per week do you miss taking your medication? 0        Review of Systems   Constitutional, HEENT, cardiovascular, pulmonary, gi and gu systems are negative, except as otherwise noted.      Objective    /62 (BP Location: Right arm, Patient Position: Sitting, Cuff Size: Adult Regular)   Pulse 61   Temp 97.3  F (36.3  C) (Oral)   Wt 93.9 kg (207 lb)   SpO2 98%   BMI 32.91 kg/m    There is no height or weight on file to calculate BMI.  Physical Exam   GENERAL: healthy, alert and no distress  MS: no gross musculoskeletal defects noted, no edema  SKIN: no suspicious lesions or rashes  NEURO: Normal strength and tone, mentation intact and speech normal  PSYCH: mentation appears normal, affect normal/bright  Diabetic foot exam: normal DP and PT pulses, no trophic changes or ulcerative lesions and diminished sensation to vibration sense.     Results for orders placed or performed in visit on 10/31/22 (from the past 24 hour(s))   Hemoglobin A1c   Result Value Ref Range    Hemoglobin A1C 6.0 (H) 0.0 - 5.6 %     Other labs pending.

## 2022-10-31 NOTE — PATIENT INSTRUCTIONS
Call to schedule imaging  OR 1 [phone]      Schedule an ultrasound at your convenience.     Stop Vitamin E and Vitamin C    Reduce metformin to ONCE daily (with supper)    Return to clinic 6 months (May '23) for annual exam//diabetes follow up

## 2022-11-01 LAB
CREAT UR-MCNC: 91 MG/DL
MICROALBUMIN UR-MCNC: 160 MG/L
MICROALBUMIN/CREAT UR: 175.82 MG/G CR (ref 0–17)

## 2022-11-01 NOTE — RESULT ENCOUNTER NOTE
Zoey Romo    Cholesterol looks great.   Electrolytes and kidney function are normal/stable.   A1C is at goal.     Sincerely,       ABELARDO OREILLY M.D.

## 2022-12-01 ENCOUNTER — TRANSFERRED RECORDS (OUTPATIENT)
Dept: MULTI SPECIALTY CLINIC | Facility: CLINIC | Age: 82
End: 2022-12-01

## 2022-12-01 LAB — RETINOPATHY: NORMAL

## 2023-04-07 ENCOUNTER — ANCILLARY PROCEDURE (OUTPATIENT)
Dept: ULTRASOUND IMAGING | Facility: CLINIC | Age: 83
End: 2023-04-07
Attending: INTERNAL MEDICINE
Payer: COMMERCIAL

## 2023-04-07 DIAGNOSIS — I71.43 INFRARENAL ABDOMINAL AORTIC ANEURYSM (AAA) WITHOUT RUPTURE (H): ICD-10-CM

## 2023-04-07 PROCEDURE — 76775 US EXAM ABDO BACK WALL LIM: CPT | Mod: TC | Performed by: RADIOLOGY

## 2023-04-07 NOTE — LETTER
April 7, 2023    Zoey Romo  [address] [address]  [facility] [address]    Dear Zoey:    We are writing to inform you of your test results.    There has been some slight increase in dilation of the iliac arteries.    The aorta is stable.   You can review this with GISELA Bautista at the upcoming visit.   Resulted Orders   US Abdominal Aorta Imaging    Narrative    US ABDOMINAL AORTA 4/7/2023 8:15 AM    HISTORY: 83-year-old patient with history of abdominal aortic  aneurysm.    COMPARISON: September 15, 2021    FINDINGS: The proximal abdominal aorta is 3 x 3 cm, mid abdominal  aorta 2.4 cm AP by 2.6 cm transverse. The distal abdominal aorta  measures up to 4.2 cm AP by 3.7 cm transverse by 4.1 cm in length,  unchanged from previous exam. Right common iliac artery measures up to  1.8 cm and the left measures up to 1.9 cm, previously 1.5 cm.      Impression    IMPRESSION:  1. Infrarenal abdominal aorta measures up to 4.2 cm AP by 3.7 cm  transverse by 4.1 cm in length, size is unchanged.  2. Aneurysmal dilatation of the right common iliac artery measuring up  to 1.8 cm, previously 1.5 cm. Aneurysmal dilatation of the left common  iliac artery measuring up to 1.9 cm, previously 1.5 cm.    ERIBERTO JACKSON MD         SYSTEM ID:  [id number]   If you have any questions or concerns, please call the clinic at the number listed above.     Sincerely,      Kia Mcmanus MD/daniele

## 2023-04-07 NOTE — RESULT ENCOUNTER NOTE
Zoey Romo    There has been some slight increase in dilation of the iliac arteries.    The aorta is stable.   You can review this with Dr Bautista at the upcoming visit.     Sincerely,       ABELARDO OREILLY M.D.

## 2023-04-23 ENCOUNTER — HEALTH MAINTENANCE LETTER (OUTPATIENT)
Age: 83
End: 2023-04-23

## 2023-05-09 PROBLEM — D22.9 MULTIPLE NEVI: Status: RESOLVED | Noted: 2018-04-19 | Resolved: 2023-05-09

## 2023-05-09 PROBLEM — E11.9 TYPE 2 DIABETES MELLITUS WITHOUT COMPLICATION, WITHOUT LONG-TERM CURRENT USE OF INSULIN (H): Status: RESOLVED | Noted: 2017-03-21 | Resolved: 2023-05-09

## 2023-05-09 PROBLEM — E21.0 PRIMARY HYPERPARATHYROIDISM (H): Status: ACTIVE | Noted: 2021-09-06

## 2023-05-16 ENCOUNTER — OFFICE VISIT (OUTPATIENT)
Dept: FAMILY MEDICINE | Facility: CLINIC | Age: 83
End: 2023-05-16
Payer: COMMERCIAL

## 2023-05-16 VITALS
TEMPERATURE: 97.9 F | HEIGHT: 67 IN | OXYGEN SATURATION: 99 % | RESPIRATION RATE: 16 BRPM | SYSTOLIC BLOOD PRESSURE: 156 MMHG | HEART RATE: 65 BPM | WEIGHT: 210 LBS | BODY MASS INDEX: 32.96 KG/M2 | DIASTOLIC BLOOD PRESSURE: 81 MMHG

## 2023-05-16 DIAGNOSIS — Z00.00 ENCOUNTER FOR MEDICARE ANNUAL WELLNESS EXAM: Primary | ICD-10-CM

## 2023-05-16 DIAGNOSIS — E11.22 TYPE 2 DIABETES MELLITUS WITH STAGE 3A CHRONIC KIDNEY DISEASE, WITHOUT LONG-TERM CURRENT USE OF INSULIN (H): ICD-10-CM

## 2023-05-16 DIAGNOSIS — Z23 NEED FOR DIPHTHERIA-TETANUS-PERTUSSIS (TDAP) VACCINE: ICD-10-CM

## 2023-05-16 DIAGNOSIS — N18.31 TYPE 2 DIABETES MELLITUS WITH STAGE 3A CHRONIC KIDNEY DISEASE, WITHOUT LONG-TERM CURRENT USE OF INSULIN (H): ICD-10-CM

## 2023-05-16 DIAGNOSIS — Z23 NEED FOR SHINGLES VACCINE: ICD-10-CM

## 2023-05-16 DIAGNOSIS — I10 HYPERTENSION GOAL BP (BLOOD PRESSURE) < 140/90: ICD-10-CM

## 2023-05-16 DIAGNOSIS — M1A.9XX0 CHRONIC GOUT WITHOUT TOPHUS, UNSPECIFIED CAUSE, UNSPECIFIED SITE: ICD-10-CM

## 2023-05-16 DIAGNOSIS — I71.43 INFRARENAL ABDOMINAL AORTIC ANEURYSM (AAA) WITHOUT RUPTURE (H): ICD-10-CM

## 2023-05-16 PROBLEM — H02.403 INVOLUTIONAL PTOSIS, ACQUIRED, BILATERAL: Status: RESOLVED | Noted: 2022-02-16 | Resolved: 2023-05-16

## 2023-05-16 PROBLEM — E21.0 PRIMARY HYPERPARATHYROIDISM (H): Status: RESOLVED | Noted: 2021-09-06 | Resolved: 2023-05-16

## 2023-05-16 PROBLEM — I44.30 AV BLOCK: Status: ACTIVE | Noted: 2021-10-28

## 2023-05-16 PROBLEM — E66.9 OBESITY: Status: ACTIVE | Noted: 2019-04-26

## 2023-05-16 LAB
HBA1C MFR BLD: 6.3 % (ref 0–5.6)
HGB BLD-MCNC: 14.7 G/DL (ref 13.3–17.7)
POTASSIUM SERPL-SCNC: 3.6 MMOL/L (ref 3.4–5.3)
URATE SERPL-MCNC: 4.5 MG/DL (ref 3.4–7)

## 2023-05-16 PROCEDURE — 83036 HEMOGLOBIN GLYCOSYLATED A1C: CPT | Performed by: FAMILY MEDICINE

## 2023-05-16 PROCEDURE — 0124A COVID-19 BIVALENT 12+ (PFIZER): CPT | Performed by: FAMILY MEDICINE

## 2023-05-16 PROCEDURE — 84132 ASSAY OF SERUM POTASSIUM: CPT | Performed by: FAMILY MEDICINE

## 2023-05-16 PROCEDURE — 99213 OFFICE O/P EST LOW 20 MIN: CPT | Mod: 25 | Performed by: FAMILY MEDICINE

## 2023-05-16 PROCEDURE — 85018 HEMOGLOBIN: CPT | Performed by: FAMILY MEDICINE

## 2023-05-16 PROCEDURE — 82088 ASSAY OF ALDOSTERONE: CPT | Performed by: FAMILY MEDICINE

## 2023-05-16 PROCEDURE — 91312 COVID-19 BIVALENT 12+ (PFIZER): CPT | Performed by: FAMILY MEDICINE

## 2023-05-16 PROCEDURE — 99000 SPECIMEN HANDLING OFFICE-LAB: CPT | Performed by: FAMILY MEDICINE

## 2023-05-16 PROCEDURE — G0439 PPPS, SUBSEQ VISIT: HCPCS | Performed by: FAMILY MEDICINE

## 2023-05-16 PROCEDURE — 84244 ASSAY OF RENIN: CPT | Mod: 90 | Performed by: FAMILY MEDICINE

## 2023-05-16 PROCEDURE — 84550 ASSAY OF BLOOD/URIC ACID: CPT | Performed by: FAMILY MEDICINE

## 2023-05-16 PROCEDURE — 36415 COLL VENOUS BLD VENIPUNCTURE: CPT | Performed by: FAMILY MEDICINE

## 2023-05-16 RX ORDER — ALLOPURINOL 100 MG/1
100 TABLET ORAL DAILY
Qty: 90 TABLET | Refills: 3 | Status: SHIPPED | OUTPATIENT
Start: 2023-05-16 | End: 2024-05-22

## 2023-05-16 RX ORDER — DILTIAZEM HYDROCHLORIDE 240 MG/1
TABLET, EXTENDED RELEASE ORAL
COMMUNITY
Start: 2023-04-22 | End: 2023-05-16

## 2023-05-16 RX ORDER — TRIAMCINOLONE ACETONIDE 1 MG/G
CREAM TOPICAL
COMMUNITY
Start: 2023-04-19

## 2023-05-16 RX ORDER — TERAZOSIN 1 MG/1
1 CAPSULE ORAL AT BEDTIME
Qty: 90 CAPSULE | Refills: 0 | Status: SHIPPED | OUTPATIENT
Start: 2023-05-16 | End: 2023-06-21

## 2023-05-16 ASSESSMENT — ACTIVITIES OF DAILY LIVING (ADL): CURRENT_FUNCTION: NO ASSISTANCE NEEDED

## 2023-05-16 NOTE — PATIENT INSTRUCTIONS
Patient Education   Personalized Prevention Plan  You are due for the preventive services outlined below.  Your care team is available to assist you in scheduling these services.  If you have already completed any of these items, please share that information with your care team to update in your medical record.  Health Maintenance Due   Topic Date Due     Zoster (Shingles) Vaccine (1 of 2) Never done     Diptheria Tetanus Pertussis (DTAP/TDAP/TD) Vaccine (2 - Td or Tdap) 02/01/2023     Eye Exam  02/09/2023     COVID-19 Vaccine (6 - Pfizer series) 02/28/2023     A1C Lab  04/30/2023     Hemoglobin  03/11/2023       Exercise for a Healthier Heart  You may wonder how you can improve the health of your heart. If you re thinking about exercise, you re on the right track. You don t need to become an athlete. But you do need a certain amount of brisk exercise to help strengthen your heart. If you have been diagnosed with a heart condition, your healthcare provider may advise exercise to help your condition. To help make exercise a habit, choose safe, fun activities.      Exercise with a friend. When activity is fun, you're more likely to stick with it.     Before you start  Check with your healthcare provider before starting an exercise program. This is especially important if you haven't been active for a while. It's also important if you have a long-term (chronic) health problem such as heart disease, diabetes, or obesity. Also check with your provider if you're at high risk for having these problems.   Why exercise?  Exercising regularly offers many healthy rewards. It can help you do all of these:     Improve your blood cholesterol level to help prevent further heart trouble.    Lower your blood pressure to help prevent a stroke or heart attack.    Control diabetes or reduce your risk of getting this disease.    Improve your heart and lung function.    Reach and stay at a healthy weight.    Make your muscles stronger  so you can stay active.    Prevent falls and fractures by slowing the loss of bone mass (osteoporosis).    Manage stress better.    Improve your sense of self and your body image.  Exercise tips      Ease into your routine. Set small goals. Then build on them. Talk with your healthcare provider first before starting an exercise routine if you're not sure what your activity level should be.    Exercise on most days. Aim for a total of at least 150 minutes (2 hours and 30 minutes) or more of moderate-intensity aerobic activity each week. You could also do 75 minutes (1 hour and 15 minutes) or more of vigorous-intensity aerobic activity each week. Or try for a combination of both. Moderate activity means that you breathe heavier and your heart rate increases, but you can still talk. Think about doing at least 30 minutes of moderate exercise, 5 times a week. It's OK to work up to the 30-minute period over time. Examples of moderate-intensity activity are brisk walking, gardening, and water aerobics.    Step up your daily activity level.  Along with your exercise program, try being more active the whole day. Walk instead of drive. Or park further away so that you take more steps each day. Do more household tasks or yard work. You may not be able to meet the advised amount of physical activity. But doing some moderate- or vigorous-intensity aerobic activity can help reduce your risk for heart disease. Your healthcare provider can help you figure out what is best for you.    Choose 1 or more activities you enjoy.  Walking is one of the easiest things you can do. You can also try swimming, riding a bike, dancing, or taking an exercise class.    Call 911  Call 911 right away if any of these occur:     Chest pain that doesn't go away quickly with rest    New burning, tightness, pressure, or heaviness in your chest, neck, shoulders, back, or arms    Abnormal or severe shortness of breath    A very fast or irregular heartbeat  (palpitations)    Fainting  When to call your healthcare provider  Call your healthcare provider if you have any of these:     Dizziness or lightheadedness    Mild shortness of breath or chest pain    Increased or new joint or muscle pain    Harsha last reviewed this educational content on 7/1/2022 2000-2022 [employer]. All rights reserved. This information is not intended as a substitute for professional medical care. Always follow your healthcare professional's instructions.

## 2023-05-16 NOTE — PROGRESS NOTES
"SUBJECTIVE:   Zoey is a 83 year old who presents for Preventive Visit.      5/16/2023     8:30 AM   Additional Questions   Roomed by Jasmin HUMPHREY CMA     Patient has been advised of split billing requirements and indicates understanding: Yes  Are you in the first 12 months of your Medicare coverage?  No    Patient also presents to follow-up diabetes. Diabetic Review of Systems - Medication compliance:  compliant all of the time, Diabetic diet compliance:  compliant most of the time, Home glucose monitoring:  blood glucose record WAS NOT brought in today, Diabetic ROS: no polyuria or polydipsia, no chest pain, dyspnea or TIA's, no numbness, tingling or pain in extremities, no unusual visual symptoms, no hypoglycemia, no medication side effects noted. Hypertension well controlled on current medications without side effects, chest pain, or dyspnea. He denies ever having gout attacks but has been on allopurinol for decades.     Healthy Habits:     In general, how would you rate your overall health?  Good    Frequency of exercise:  1 day/week    Duration of exercise:  Less than 15 minutes    Do you usually eat at least 4 servings of fruit and vegetables a day, include whole grains    & fiber and avoid regularly eating high fat or \"junk\" foods?  Yes    Taking medications regularly:  Yes    Barriers to taking medications:  None    Medication side effects:  None    Ability to successfully perform activities of daily living:  No assistance needed    Home Safety:  No safety concerns identified    Hearing Impairment:  No hearing concerns    In the past 6 months, have you been bothered by leaking of urine?  No    In general, how would you rate your overall mental or emotional health?  Excellent      PHQ-2 Total Score: 0    Additional concerns today:  No      Have you ever done Advance Care Planning? (For example, a Health Directive, POLST, or a discussion with a medical provider or your loved ones about your wishes): Yes, advance " care planning is on file.       Fall risk  Fallen 2 or more times in the past year?: No  Any fall with injury in the past year?: No    Cognitive Screening   1) Repeat 3 items (Leader, Season, Table)  2) Clock draw: NORMAL  3) 3 item recall: Recalls 3 objects  Results: 3 items recalled: COGNITIVE IMPAIRMENT LESS LIKELY    Mini-CogTM Copyright ANASTASIA Chadwick. Licensed by the author for use in [facility]; reprinted with permission (asuh@[facility]). All rights reserved.      Do you have sleep apnea, excessive snoring or daytime drowsiness?: no    Reviewed and updated as needed this visit by clinical staff   Tobacco  Allergies  Meds  Problems  Med Hx  Surg Hx  Fam Hx          Reviewed and updated as needed this visit by Provider   Tobacco  Allergies  Meds  Problems  Med Hx  Surg Hx  Fam Hx         Social History     Tobacco Use     Smoking status: Former     Packs/day: 0.50     Years: 10.00     Pack years: 5.00     Types: Cigars, Cigarettes     Quit date: 1985     Years since quittin.3     Smokeless tobacco: Never   Vaping Use     Vaping status: Never Used   Substance Use Topics     Alcohol use: Yes     Alcohol/week: 5.8 standard drinks of alcohol     Comment: occasional              2023     8:45 AM   Alcohol Use   Prescreen: >3 drinks/day or >7 drinks/week? No     Do you have a current opioid prescription? No  Do you use any other controlled substances or medications that are not prescribed by a provider? None     Current providers sharing in care for this patient include:   Patient Care Team:  Francisca Bautista MD as PCP - General (Family Medicine)  Kia Mcmanus MD as Assigned PCP  Wilson Scruggs MD as Assigned Surgical Provider    The following health maintenance items are reviewed in Epic and correct as of today:  Health Maintenance   Topic Date Due     ZOSTER IMMUNIZATION (1 of 2) Never done     URIC ACID  2020     DTAP/TDAP/TD IMMUNIZATION (2 - Td or  Tdap) 02/01/2023     EYE EXAM  02/09/2023     A1C  04/30/2023     HEMOGLOBIN  03/11/2023     BMP  10/31/2023     LIPID  10/31/2023     MICROALBUMIN  10/31/2023     DIABETIC FOOT EXAM  10/31/2023     MEDICARE ANNUAL WELLNESS VISIT  05/16/2024     ANNUAL REVIEW OF HM ORDERS  05/16/2024     FALL RISK ASSESSMENT  05/16/2024     ADVANCE CARE PLANNING  05/16/2028     PHQ-2 (once per calendar year)  Completed     INFLUENZA VACCINE  Completed     Pneumococcal Vaccine: 65+ Years  Completed     URINALYSIS  Completed     COVID-19 Vaccine  Completed     IPV IMMUNIZATION  Aged Out     MENINGITIS IMMUNIZATION  Aged Out     COLORECTAL CANCER SCREENING  Discontinued     Patient Active Problem List   Diagnosis     HYPERLIPIDEMIA LDL GOAL <40     Dermatochalasis OU     NORMA (obstructive sleep apnea)     Cataract OU     Eczema     Type 2 diabetes mellitus with stage 3a chronic kidney disease, without long-term current use of insulin (H)     Chronic gout without tophus, unspecified cause, unspecified site     Abdominal aortic aneurysm without rupture (H)     Hypertension goal BP (blood pressure) < 140/90     Obesity     History of basal cell cancer     AV block     Past Surgical History:   Procedure Laterality Date     BIOPSY       BLEPHAROPLASTY BILATERAL Bilateral 03/21/2022    Procedure: Blepharoplasty bilateral;  Surgeon: Wilson Scruggs MD;  Location: [facility] OR     COLONOSCOPY       LUMBAR DISC SURGERY  2012     OPTICAL TRACKING SYSTEM FUSION POSTERIOR SPINE LUMBAR  04/29/2013    Procedure: OPTICAL TRACKING SYSTEM FUSION SPINE POSTERIOR LUMBAR ONE LEVEL;  Lumbar 4-5 Laminectomy, Decompression; Lumbar 4-5 Transforaminal Lumbar Interbody Fusion ;  Surgeon: Saurav Valdez MD;  Location: [facility] OR     REPAIR PTOSIS BILATERAL Bilateral 03/21/2022    Procedure: both upper eyelid and ptosis repair;  Surgeon: Wilson Scruggs MD;  Location: [facility] OR       Social History     Tobacco Use     Smoking status: Former     Packs/day:  0.50     Years: 10.00     Pack years: 5.00     Types: Cigars, Cigarettes     Quit date: 1985     Years since quittin.3     Smokeless tobacco: Never   Vaping Use     Vaping status: Never Used   Substance Use Topics     Alcohol use: Yes     Alcohol/week: 5.8 standard drinks of alcohol     Comment: occasional      Family History   Problem Relation Age of Onset     Heart Disease Father      Cancer Paternal Grandfather         leukemia      Heart Disease Brother      Diabetes Brother          Current Outpatient Medications   Medication Sig Dispense Refill     allopurinol (ZYLOPRIM) 100 MG tablet Take 1 tablet (100 mg) by mouth daily 90 tablet 3     ammonium lactate (LAC-HYDRIN) 12 % external cream Apply topically 2 times daily as needed for dry skin 385 g 3     aspirin 81 MG tablet Take 1 tablet by mouth daily.       blood glucose (NO BRAND SPECIFIED) lancets standard Use to test blood sugar 1 times daily or as directed. 100 each 3     blood glucose (NO BRAND SPECIFIED) test strip Use to test blood sugar 1 times daily or as directed. One touch or per patient's insurance 100 strip 1     chlorthalidone (HYGROTON) 25 MG tablet Take 1 tablet (25 mg) by mouth daily 90 tablet 3     diltiazem ER COATED BEADS (CARDIZEM LA) 240 MG 24 hr tablet Take 1 tablet (240 mg) by mouth daily 90 tablet 4     Glucosamine-Chondroit-Vit C-Mn (GLUCOSAMINE-CHONDROITIN MAX ST PO) Take by mouth 2 times daily 1500/1103mg       lisinopril (ZESTRIL) 40 MG tablet TAKE 1 TABLET DAILY 90 tablet 3     metFORMIN (GLUCOPHAGE) 500 MG tablet Take 1 tablet (500 mg) by mouth daily (with dinner) 90 tablet 3     mometasone (ELOCON) 0.1 % external cream APPLY SPARINGLY TO AFFECTED AREA TWICE A DAY AS NEEDED (DO NOT APPLY TO FACE) 45 g 14     pravastatin (PRAVACHOL) 80 MG tablet Take 1 tablet (80 mg) by mouth daily 90 tablet 3     terazosin (HYTRIN) 1 MG capsule Take 1 capsule (1 mg) by mouth At Bedtime 90 capsule 0     triamcinolone (KENALOG) 0.1 %  "external cream PLEASE SEE ATTACHED FOR DETAILED DIRECTIONS       Vitamin D3 (CHOLECALCIFEROL) 25 mcg (1000 units) tablet Take 25 mcg by mouth daily       Tdap, tetanus-diptheria-acell pertussis, (BOOSTRIX) 5-2.5-18.5 LF-MCG/0.5 JOSIE injection Inject 0.5 mLs into the muscle once for 1 dose 0.5 mL 0     zoster vaccine recombinant adjuvanted (SHINGRIX) injection Inject 0.5 mLs into the muscle once for 1 dose Pharmacist administered 0.5 mL 0     Allergies   Allergen Reactions     Bee Pollen Other (See Comments)     Pollen [Pollen Extract]          Review of Systems  CONSTITUTIONAL: NEGATIVE for fever, chills, change in weight  INTEGUMENTARY/SKIN: NEGATIVE for worrisome rashes, moles or lesions  EYES: NEGATIVE for vision changes or irritation  ENT/MOUTH: NEGATIVE for ear, mouth and throat problems  RESP: NEGATIVE for significant cough or SOB  BREAST: NEGATIVE for masses, tenderness or discharge  CV: NEGATIVE for chest pain, palpitations or peripheral edema  GI: NEGATIVE for nausea, abdominal pain, heartburn, or change in bowel habits  : NEGATIVE for frequency, dysuria, or hematuria  MUSCULOSKELETAL: NEGATIVE for significant arthralgias or myalgia  NEURO: NEGATIVE for weakness, dizziness or paresthesias  ENDOCRINE: NEGATIVE for temperature intolerance, skin/hair changes  HEME: NEGATIVE for bleeding problems  PSYCHIATRIC: NEGATIVE for changes in mood or affect    OBJECTIVE:   BP (!) 156/81 (BP Location: Right arm, Patient Position: Sitting, Cuff Size: Adult Large)   Pulse 65   Temp 97.9  F (36.6  C) (Oral)   Resp 16   Ht 1.69 m (5' 6.54\")   Wt 95.3 kg (210 lb)   SpO2 99%   BMI 33.35 kg/m   Estimated body mass index is 33.35 kg/m  as calculated from the following:    Height as of this encounter: 1.69 m (5' 6.54\").    Weight as of this encounter: 95.3 kg (210 lb).  Physical Exam  GENERAL: alert, no distress, obese and elderly  EYES: Eyes grossly normal to inspection, PERRL and conjunctivae and sclerae normal  NECK: " no adenopathy, no asymmetry, masses, or scars and thyroid normal to palpation  RESP: lungs clear to auscultation - no rales, rhonchi or wheezes  CV: regular rate and rhythm, normal S1 S2, no S3 or S4, no murmur, click or rub, no peripheral edema    MS: no gross musculoskeletal defects noted, no edema  SKIN: no suspicious lesions or rashes  PSYCH: mentation appears normal, affect normal/bright    Diagnostic Test Results:  Labs reviewed in Epic    ASSESSMENT / PLAN:   (Z00.00) Encounter for Medicare annual wellness exam  (primary encounter diagnosis)    (E11.22,  N18.31) Type 2 diabetes mellitus with stage 3a chronic kidney disease, without long-term current use of insulin (H)  Comment: Well controlled with medications without side effects.   Plan: HEMOGLOBIN A1C, Hemoglobin, OFFICE/OUTPT         VISIT,EST,LEVL IV          (I71.43) Infrarenal abdominal aortic aneurysm (AAA) without rupture (H)  Comment: stable   Plan: OFFICE/OUTPT VISIT,EST,LEVL IV        Annual ultrasound     (I10) Hypertension goal BP (blood pressure) < 140/90  Comment: uncontrolled   Plan: Aldosterone, Renin activity, Aldosterone Renin         Ratio, Potassium, terazosin (HYTRIN) 1 MG         capsule, OFFICE/OUTPT VISIT,EST,LEVL IV        Add hytrin. Discussed risks and benefits of this medication. Follow-up ancillary blood pressure check recommended.     (M1A.9XX0) Chronic gout without tophus, unspecified cause, unspecified site  Comment: no history of attacks   Plan: Uric acid, allopurinol (ZYLOPRIM) 100 MG         tablet, OFFICE/OUTPT VISIT,EST,LEVL IV        Reduce dose and follow uric acid             COUNSELING:  Reviewed preventive health counseling, as reflected in patient instructions  Special attention given to:       Regular exercise       Healthy diet/nutrition       Vision screening       The ASCVD Risk score (Alva DK, et al., 2019) failed to calculate for the following reasons:    The 2019 ASCVD risk score is only valid for ages 40  "to 79      BMI:   Estimated body mass index is 33.35 kg/m  as calculated from the following:    Height as of this encounter: 1.69 m (5' 6.54\").    Weight as of this encounter: 95.3 kg (210 lb).   Weight management plan: Discussed healthy diet and exercise guidelines      He reports that he quit smoking about 38 years ago. His smoking use included cigars and cigarettes. He has a 5.00 pack-year smoking history. He has never used smokeless tobacco.      Appropriate preventive services were discussed with this patient, including applicable screening as appropriate for cardiovascular disease, diabetes, osteopenia/osteoporosis, and glaucoma.  As appropriate for age/gender, discussed screening for colorectal cancer, prostate cancer, breast cancer, and cervical cancer. Checklist reviewing preventive services available has been given to the patient.    Reviewed patients plan of care and provided an AVS. The Basic Care Plan (routine screening as documented in Health Maintenance) for Zoey meets the Care Plan requirement. This Care Plan has been established and reviewed with the Patient.          Francisca Bautista MD  [facility]    Identified Health Risks:  I have reviewed Opioid Use Disorder and Substance Use Disorder risk factors and made any needed referrals.     He is at risk for lack of exercise and has been provided with information to increase physical activity for the benefit of his well-being.  "

## 2023-05-19 LAB — ALDOST SERPL-MCNC: 13.7 NG/DL (ref 0–31)

## 2023-05-24 LAB
ALDOST/RENIN PLAS-RTO: 19.6 {RATIO} (ref 0–25)
RENIN PLAS-CCNC: 0.7 NG/ML/HR

## 2023-05-24 NOTE — RESULT ENCOUNTER NOTE
Your results are normal.  Your final test results are pending.  Please check your chart again within 2 - 3 days. You will receive further instruction when your full test result panel is complete.     Francisca Bautista MD

## 2023-06-21 ENCOUNTER — ALLIED HEALTH/NURSE VISIT (OUTPATIENT)
Dept: FAMILY MEDICINE | Facility: CLINIC | Age: 83
End: 2023-06-21
Payer: COMMERCIAL

## 2023-06-21 VITALS — SYSTOLIC BLOOD PRESSURE: 110 MMHG | HEART RATE: 70 BPM | DIASTOLIC BLOOD PRESSURE: 58 MMHG

## 2023-06-21 DIAGNOSIS — I10 HYPERTENSION GOAL BP (BLOOD PRESSURE) < 140/90: ICD-10-CM

## 2023-06-21 DIAGNOSIS — I10 HYPERTENSION GOAL BP (BLOOD PRESSURE) < 140/90: Primary | ICD-10-CM

## 2023-06-21 PROCEDURE — 99207 PR NO CHARGE NURSE ONLY: CPT

## 2023-06-21 RX ORDER — TERAZOSIN 1 MG/1
1 CAPSULE ORAL AT BEDTIME
Qty: 90 CAPSULE | Refills: 3 | Status: SHIPPED | OUTPATIENT
Start: 2023-06-21 | End: 2023-12-11 | Stop reason: SINTOL

## 2023-06-21 NOTE — PROGRESS NOTES
Zoey Romo is a 83 year old patient who comes in today for a Blood Pressure check.  Initial BP:  /58 (BP Location: Left arm, Patient Position: Chair, Cuff Size: Adult Large)   Pulse 70      70  Disposition: results routed to provider    Jessica Chan MA

## 2023-09-28 DIAGNOSIS — E11.22 TYPE 2 DIABETES MELLITUS WITH STAGE 3A CHRONIC KIDNEY DISEASE, WITHOUT LONG-TERM CURRENT USE OF INSULIN (H): ICD-10-CM

## 2023-09-28 DIAGNOSIS — N18.31 TYPE 2 DIABETES MELLITUS WITH STAGE 3A CHRONIC KIDNEY DISEASE, WITHOUT LONG-TERM CURRENT USE OF INSULIN (H): ICD-10-CM

## 2023-09-28 DIAGNOSIS — I10 HYPERTENSION GOAL BP (BLOOD PRESSURE) < 130/80: ICD-10-CM

## 2023-09-28 RX ORDER — LISINOPRIL 40 MG/1
TABLET ORAL
Qty: 90 TABLET | Refills: 3 | Status: SHIPPED | OUTPATIENT
Start: 2023-09-28 | End: 2024-08-07

## 2023-10-25 ENCOUNTER — MYC MEDICAL ADVICE (OUTPATIENT)
Dept: FAMILY MEDICINE | Facility: CLINIC | Age: 83
End: 2023-10-25
Payer: COMMERCIAL

## 2023-11-01 NOTE — TELEPHONE ENCOUNTER
Monica - Should this patient schedule a physical or wait until her next annual AWV is due?     I am happy to perform a physical examination and attend to all these clinical needs at the scheduled office visit as planned.     Francisca Bautista MD

## 2023-11-01 NOTE — TELEPHONE ENCOUNTER
Appointment changed to adult preventative. Information put in appointment note regarding 2nd physical.  Dolly Bergeron CMA

## 2023-11-01 NOTE — TELEPHONE ENCOUNTER
I did double check and we billed a  AWV in May, so yes, [facility] will pay for a [id number] as well in Dec.

## 2023-11-01 NOTE — TELEPHONE ENCOUNTER
Good morning, there is a little known fact that some [facility] programs will pay for an annual wellness visit and a [address] over 65 physical every year. This sounds like one of those scenarios, so all we would have to do is ensure we use the 02040 code and it will go through just fine.

## 2023-11-01 NOTE — TELEPHONE ENCOUNTER
Switch upcoming appointment to a physical and add in appointment notes that the patient's insurance will pay for a physical even though a AWV was done this past year     Francisca Bautista MD

## 2023-11-03 DIAGNOSIS — E78.5 HYPERLIPIDEMIA LDL GOAL <100: ICD-10-CM

## 2023-11-03 RX ORDER — PRAVASTATIN SODIUM 80 MG/1
80 TABLET ORAL DAILY
Qty: 90 TABLET | Refills: 0 | Status: SHIPPED | OUTPATIENT
Start: 2023-11-03 | End: 2023-12-11

## 2023-11-15 ENCOUNTER — IMMUNIZATION (OUTPATIENT)
Dept: NURSING | Facility: CLINIC | Age: 83
End: 2023-11-15
Payer: COMMERCIAL

## 2023-11-15 PROCEDURE — 91320 SARSCV2 VAC 30MCG TRS-SUC IM: CPT

## 2023-11-15 PROCEDURE — 90480 ADMN SARSCOV2 VAC 1/ONLY CMP: CPT

## 2023-11-29 NOTE — TELEPHONE ENCOUNTER
Prescription approved per [facility] Refill Protocol. Medication is being filled for 1 time refill only due to:  Patient needs labs FLP, Uric Acid, Cr, CBC, AST/ALT, K. and patient is due for a follow up appointment.     Tere St RN - BC         no

## 2023-12-02 ENCOUNTER — HEALTH MAINTENANCE LETTER (OUTPATIENT)
Age: 83
End: 2023-12-02

## 2023-12-11 ENCOUNTER — ANCILLARY PROCEDURE (OUTPATIENT)
Dept: GENERAL RADIOLOGY | Facility: CLINIC | Age: 83
End: 2023-12-11
Attending: FAMILY MEDICINE
Payer: COMMERCIAL

## 2023-12-11 ENCOUNTER — OFFICE VISIT (OUTPATIENT)
Dept: FAMILY MEDICINE | Facility: CLINIC | Age: 83
End: 2023-12-11
Payer: COMMERCIAL

## 2023-12-11 VITALS
RESPIRATION RATE: 16 BRPM | SYSTOLIC BLOOD PRESSURE: 147 MMHG | WEIGHT: 208 LBS | TEMPERATURE: 98.1 F | OXYGEN SATURATION: 98 % | BODY MASS INDEX: 32.65 KG/M2 | HEIGHT: 67 IN | DIASTOLIC BLOOD PRESSURE: 77 MMHG | HEART RATE: 69 BPM

## 2023-12-11 DIAGNOSIS — N18.31 TYPE 2 DIABETES MELLITUS WITH STAGE 3A CHRONIC KIDNEY DISEASE, WITHOUT LONG-TERM CURRENT USE OF INSULIN (H): ICD-10-CM

## 2023-12-11 DIAGNOSIS — I10 HYPERTENSION GOAL BP (BLOOD PRESSURE) < 140/90: ICD-10-CM

## 2023-12-11 DIAGNOSIS — M25.562 CHRONIC PAIN OF LEFT KNEE: ICD-10-CM

## 2023-12-11 DIAGNOSIS — E83.52 HYPERCALCEMIA: ICD-10-CM

## 2023-12-11 DIAGNOSIS — G89.29 CHRONIC PAIN OF LEFT KNEE: ICD-10-CM

## 2023-12-11 DIAGNOSIS — E11.42 DIABETIC POLYNEUROPATHY ASSOCIATED WITH TYPE 2 DIABETES MELLITUS (H): ICD-10-CM

## 2023-12-11 DIAGNOSIS — E78.5 HYPERLIPIDEMIA LDL GOAL <100: ICD-10-CM

## 2023-12-11 DIAGNOSIS — E87.1 HYPONATREMIA: ICD-10-CM

## 2023-12-11 DIAGNOSIS — I71.40 ABDOMINAL AORTIC ANEURYSM (AAA) WITHOUT RUPTURE, UNSPECIFIED PART (H): ICD-10-CM

## 2023-12-11 DIAGNOSIS — G47.33 OSA (OBSTRUCTIVE SLEEP APNEA): ICD-10-CM

## 2023-12-11 DIAGNOSIS — E11.22 TYPE 2 DIABETES MELLITUS WITH STAGE 3A CHRONIC KIDNEY DISEASE, WITHOUT LONG-TERM CURRENT USE OF INSULIN (H): ICD-10-CM

## 2023-12-11 DIAGNOSIS — Z29.11 NEED FOR VACCINATION AGAINST RESPIRATORY SYNCYTIAL VIRUS: ICD-10-CM

## 2023-12-11 DIAGNOSIS — Z00.00 ENCOUNTER FOR MEDICARE ANNUAL WELLNESS EXAM: Primary | ICD-10-CM

## 2023-12-11 LAB
ANION GAP SERPL CALCULATED.3IONS-SCNC: 5 MMOL/L (ref 7–15)
BUN SERPL-MCNC: 21 MG/DL (ref 8–23)
CALCIUM SERPL-MCNC: 10.6 MG/DL (ref 8.8–10.2)
CHLORIDE SERPL-SCNC: 101 MMOL/L (ref 98–107)
CHOLEST SERPL-MCNC: 157 MG/DL
CREAT SERPL-MCNC: 1.16 MG/DL (ref 0.67–1.17)
DEPRECATED HCO3 PLAS-SCNC: 25 MMOL/L (ref 22–29)
EGFRCR SERPLBLD CKD-EPI 2021: 62 ML/MIN/1.73M2
FASTING STATUS PATIENT QL REPORTED: NORMAL
GLUCOSE SERPL-MCNC: 132 MG/DL (ref 70–99)
HBA1C MFR BLD: 6.2 % (ref 0–5.6)
HDLC SERPL-MCNC: 63 MG/DL
LDLC SERPL CALC-MCNC: 76 MG/DL
NONHDLC SERPL-MCNC: 94 MG/DL
POTASSIUM SERPL-SCNC: 3.7 MMOL/L (ref 3.4–5.3)
SODIUM SERPL-SCNC: 131 MMOL/L (ref 135–145)
TRIGL SERPL-MCNC: 92 MG/DL

## 2023-12-11 PROCEDURE — 80061 LIPID PANEL: CPT | Performed by: FAMILY MEDICINE

## 2023-12-11 PROCEDURE — 73562 X-RAY EXAM OF KNEE 3: CPT | Mod: TC | Performed by: RADIOLOGY

## 2023-12-11 PROCEDURE — 99397 PER PM REEVAL EST PAT 65+ YR: CPT | Mod: 25 | Performed by: FAMILY MEDICINE

## 2023-12-11 PROCEDURE — 83036 HEMOGLOBIN GLYCOSYLATED A1C: CPT | Performed by: FAMILY MEDICINE

## 2023-12-11 PROCEDURE — G0008 ADMIN INFLUENZA VIRUS VAC: HCPCS | Performed by: FAMILY MEDICINE

## 2023-12-11 PROCEDURE — 80048 BASIC METABOLIC PNL TOTAL CA: CPT | Performed by: FAMILY MEDICINE

## 2023-12-11 PROCEDURE — 99213 OFFICE O/P EST LOW 20 MIN: CPT | Mod: 25 | Performed by: FAMILY MEDICINE

## 2023-12-11 PROCEDURE — 90662 IIV NO PRSV INCREASED AG IM: CPT | Performed by: FAMILY MEDICINE

## 2023-12-11 PROCEDURE — 82043 UR ALBUMIN QUANTITATIVE: CPT | Performed by: FAMILY MEDICINE

## 2023-12-11 PROCEDURE — 36415 COLL VENOUS BLD VENIPUNCTURE: CPT | Performed by: FAMILY MEDICINE

## 2023-12-11 PROCEDURE — 82570 ASSAY OF URINE CREATININE: CPT | Performed by: FAMILY MEDICINE

## 2023-12-11 RX ORDER — PRAVASTATIN SODIUM 80 MG/1
80 TABLET ORAL DAILY
Qty: 90 TABLET | Refills: 3 | Status: SHIPPED | OUTPATIENT
Start: 2023-12-11 | End: 2024-01-30

## 2023-12-11 RX ORDER — RESPIRATORY SYNCYTIAL VIRUS VACCINE 120MCG/0.5
0.5 KIT INTRAMUSCULAR ONCE
Qty: 1 EACH | Refills: 0 | Status: CANCELLED | OUTPATIENT
Start: 2023-12-11 | End: 2023-12-11

## 2023-12-11 RX ORDER — CHLORTHALIDONE 25 MG/1
25 TABLET ORAL DAILY
Qty: 90 TABLET | Refills: 3 | Status: SHIPPED | OUTPATIENT
Start: 2023-12-11

## 2023-12-11 ASSESSMENT — ACTIVITIES OF DAILY LIVING (ADL): CURRENT_FUNCTION: NO ASSISTANCE NEEDED

## 2023-12-11 NOTE — PROGRESS NOTES
SUBJECTIVE:   Mann is a 83 year old, presenting for the following:  Physical        12/11/2023     9:34 AM   Additional Questions   Roomed by Jasmin HUMPHREY CMA   Accompanied by Self     Patient also presents with left knee pain for months without injury, locking or swelling.     Patient also presents to follow-up diabetes. Diabetic Review of Systems - Medication compliance:  compliant most of the time, Diabetic diet compliance:  compliant most of the time, Home glucose monitoring:  blood glucose record WAS NOT brought in today, Diabetic ROS: no polyuria or polydipsia, no chest pain, dyspnea or TIA's, no numbness, tingling or pain in extremities, no unusual visual symptoms, no hypoglycemia, no medication side effects noted. Hypercholesterolemia well controlled with current treatment plan without side effects. Hypertension well controlled on current medications without side effects, chest pain, or dyspnea.     History of Present Illness       Diabetes:   He presents for follow up of diabetes.  He is checking home blood glucose a few times a month.   He checks blood glucose before meals.  Blood glucose is never over 200 and never under 70. He is aware of hypoglycemia symptoms including none.    He has no concerns regarding his diabetes at this time.  He is having numbness in feet.  The patient has had a diabetic eye exam in the last 12 months. Eye exam performed on 12-22. Location of last eye exam [facility].        Hyperlipidemia:  He presents for follow up of hyperlipidemia.   He is taking medication to lower cholesterol. He is not having myalgia or other side effects to statin medications.    Hypertension: He presents for follow up of hypertension.  He does check blood pressure  regularly outside of the clinic. Outpatient blood pressures have not been over 140/90. He does not follow a low salt diet.     Vascular Disease:  He presents for follow up of vascular disease.     He never takes nitroglycerin. He takes daily  aspirin.    Reason for visit:  Yearly physical    He eats 2-3 servings of fruits and vegetables daily.He consumes 0 sweetened beverage(s) daily.He exercises with enough effort to increase his heart rate 9 or less minutes per day.  He exercises with enough effort to increase his heart rate 3 or less days per week.   He is taking medications regularly.  He is not taking prescribed medications regularly due to None.          Have you ever done Advance Care Planning? (For example, a Health Directive, POLST, or a discussion with a medical provider or your loved ones about your wishes): Yes, advance care planning is on file.       Fall risk  Fallen 2 or more times in the past year?: No  Any fall with injury in the past year?: No    Cognitive Screening   1) Repeat 3 items (Leader, Season, Table)    2) Clock draw: NORMAL  3) 3 item recall: Recalls 3 objects  Results: 3 items recalled: COGNITIVE IMPAIRMENT LESS LIKELY    Mini-CogTM Copyright S Farrukh. Licensed by the author for use in [facility]; reprinted with permission (ashu@[facility]). All rights reserved.      Do you have sleep apnea, excessive snoring or daytime drowsiness? : yes    Reviewed and updated as needed this visit by clinical staff   Tobacco  Allergies  Meds  Problems  Med Hx  Surg Hx  Fam Hx          Reviewed and updated as needed this visit by Provider   Tobacco  Allergies  Meds  Problems  Med Hx  Surg Hx  Fam Hx         Social History     Tobacco Use     Smoking status: Former     Packs/day: 0.50     Years: 10.00     Additional pack years: 0.00     Total pack years: 5.00     Types: Cigars, Cigarettes     Quit date: 1985     Years since quittin.9     Smokeless tobacco: Never   Substance Use Topics     Alcohol use: Yes     Alcohol/week: 5.8 standard drinks of alcohol     Comment: occasional              2023     8:45 AM   Alcohol Use   Prescreen: >3 drinks/day or >7 drinks/week? No     Do you have a current opioid  prescription? No  Do you use any other controlled substances or medications that are not prescribed by a provider? None     Current providers sharing in care for this patient include:   Patient Care Team:  Francisca Bautista MD as PCP - General (Family Medicine)  Francisca Bautista MD as Assigned PCP    The following health maintenance items are reviewed in Epic and correct as of today:  Health Maintenance   Topic Date Due     RSV VACCINE (Pregnancy & 60+) (1 - 1-dose 60+ series) Never done     EYE EXAM  02/09/2023     BMP  10/31/2023     LIPID  10/31/2023     MICROALBUMIN  10/31/2023     DIABETIC FOOT EXAM  10/31/2023     URIC ACID  05/16/2024     HEMOGLOBIN  05/16/2024     A1C  06/11/2024     MEDICARE ANNUAL WELLNESS VISIT  12/11/2024     ANNUAL REVIEW OF HM ORDERS  12/11/2024     FALL RISK ASSESSMENT  12/11/2024     ADVANCE CARE PLANNING  12/11/2028     DTAP/TDAP/TD IMMUNIZATION (3 - Td or Tdap) 05/23/2033     PHQ-2 (once per calendar year)  Completed     INFLUENZA VACCINE  Completed     Pneumococcal Vaccine: 65+ Years  Completed     URINALYSIS  Completed     ZOSTER IMMUNIZATION  Completed     COVID-19 Vaccine  Completed     IPV IMMUNIZATION  Aged Out     HPV IMMUNIZATION  Aged Out     MENINGITIS IMMUNIZATION  Aged Out     RSV MONOCLONAL ANTIBODY  Aged Out     COLORECTAL CANCER SCREENING  Discontinued     Patient Active Problem List   Diagnosis     HYPERLIPIDEMIA LDL GOAL <40     Dermatochalasis OU     NORMA (obstructive sleep apnea)     Cataract OU     Eczema     Type 2 diabetes mellitus with stage 3a chronic kidney disease, without long-term current use of insulin (H)     Chronic gout without tophus, unspecified cause, unspecified site     Abdominal aortic aneurysm without rupture (H24)     Hypertension goal BP (blood pressure) < 140/90     Obesity     History of basal cell cancer     AV block     Diabetic polyneuropathy associated with type 2 diabetes mellitus (H)     Past Surgical History:    Procedure Laterality Date     BIOPSY       BLEPHAROPLASTY BILATERAL Bilateral 2022    Procedure: Blepharoplasty bilateral;  Surgeon: Wilson Scruggs MD;  Location: [facility] OR     COLONOSCOPY       LUMBAR DISC SURGERY       OPTICAL TRACKING SYSTEM FUSION POSTERIOR SPINE LUMBAR  2013    Procedure: OPTICAL TRACKING SYSTEM FUSION SPINE POSTERIOR LUMBAR ONE LEVEL;  Lumbar 4-5 Laminectomy, Decompression; Lumbar 4-5 Transforaminal Lumbar Interbody Fusion ;  Surgeon: Saurav Valdez MD;  Location: [facility] OR     REPAIR PTOSIS BILATERAL Bilateral 2022    Procedure: both upper eyelid and ptosis repair;  Surgeon: Wilson Scruggs MD;  Location: [facility] OR       Social History     Tobacco Use     Smoking status: Former     Packs/day: 0.50     Years: 10.00     Additional pack years: 0.00     Total pack years: 5.00     Types: Cigars, Cigarettes     Quit date: 1985     Years since quittin.9     Smokeless tobacco: Never   Substance Use Topics     Alcohol use: Yes     Alcohol/week: 5.8 standard drinks of alcohol     Comment: occasional      Family History   Problem Relation Age of Onset     Heart Disease Father      Cancer Paternal Grandfather         leukemia      Heart Disease Brother      Diabetes Brother          Current Outpatient Medications   Medication Sig Dispense Refill     allopurinol (ZYLOPRIM) 100 MG tablet Take 1 tablet (100 mg) by mouth daily 90 tablet 3     ammonium lactate (LAC-HYDRIN) 12 % external cream Apply topically 2 times daily as needed for dry skin 385 g 3     aspirin 81 MG tablet Take 1 tablet by mouth daily.       blood glucose (NO BRAND SPECIFIED) lancets standard Use to test blood sugar 1 times daily or as directed. 100 each 3     blood glucose (NO BRAND SPECIFIED) test strip Use to test blood sugar 1 times daily or as directed. One touch or per patient's insurance 100 strip 1     chlorthalidone (HYGROTON) 25 MG tablet Take 1 tablet (25 mg) by mouth daily 90 tablet  "3     diltiazem ER COATED BEADS (CARDIZEM LA) 240 MG 24 hr tablet Take 1 tablet (240 mg) by mouth daily 90 tablet 4     Glucosamine-Chondroit-Vit C-Mn (GLUCOSAMINE-CHONDROITIN MAX ST PO) Take by mouth 2 times daily 1500/1103mg       lisinopril (ZESTRIL) 40 MG tablet TAKE 1 TABLET DAILY 90 tablet 3     metFORMIN (GLUCOPHAGE) 500 MG tablet Take 1 tablet (500 mg) by mouth daily (with dinner) 90 tablet 3     mometasone (ELOCON) 0.1 % external cream APPLY SPARINGLY TO AFFECTED AREA TWICE A DAY AS NEEDED (DO NOT APPLY TO FACE) 45 g 14     pravastatin (PRAVACHOL) 80 MG tablet Take 1 tablet (80 mg) by mouth daily 90 tablet 3     triamcinolone (KENALOG) 0.1 % external cream PLEASE SEE ATTACHED FOR DETAILED DIRECTIONS       Vitamin D3 (CHOLECALCIFEROL) 25 mcg (1000 units) tablet Take 25 mcg by mouth daily       Allergies   Allergen Reactions     Bee Pollen Other (See Comments)     Terazosin      Dry eyes, dizziness             Review of Systems      OBJECTIVE:   BP (!) 147/77 (BP Location: Right arm, Patient Position: Sitting, Cuff Size: Adult Large)   Pulse 69   Temp 98.1  F (36.7  C) (Oral)   Resp 16   Ht 1.69 m (5' 6.54\")   Wt 94.3 kg (208 lb)   SpO2 98%   BMI 33.03 kg/m   Estimated body mass index is 33.03 kg/m  as calculated from the following:    Height as of this encounter: 1.69 m (5' 6.54\").    Weight as of this encounter: 94.3 kg (208 lb).  Physical Exam  GENERAL: alert, no distress, and obese  EYES: Eyes grossly normal to inspection, PERRL and conjunctivae and sclerae normal  NECK: no adenopathy, no asymmetry, masses, or scars and thyroid normal to palpation  RESP: lungs clear to auscultation - no rales, rhonchi or wheezes  CV: regular rate and rhythm, normal S1 S2, no S3 or S4, no murmur, click or rub, no peripheral edema   MS: no gross musculoskeletal defects noted, no edema  PSYCH: mentation appears normal, affect normal/bright  Diabetic foot exam: no trophic changes or ulcerative lesions and reduced " "sensation at bilateral monofilament points     Diagnostic Test Results:  Labs reviewed in Epic    ASSESSMENT / PLAN:   (Z00.00) Encounter for Medicare annual wellness exam  (primary encounter diagnosis)    (M25.562,  G89.29) Chronic pain of left knee  Plan: OFFICE/OUTPT VISIT,EST,LEVL IV, XR Knee Left 3         Views, Orthopedic  Referral        See result note     (E11.22,  N18.31) Type 2 diabetes mellitus with stage 3a chronic kidney disease, without long-term current use of insulin (H)  (E11.42) Diabetic polyneuropathy associated with type 2 diabetes mellitus (H)  Comment: Well controlled with medications without side effects.   Plan: follow-up 3 - 6 months     (I71.40) Abdominal aortic aneurysm (AAA) without rupture, unspecified part (H24)  (E78.5) HYPERLIPIDEMIA LDL GOAL <40  Plan: pravastatin (PRAVACHOL) 80 MG tablet,         OFFICE/OUTPT VISIT,EST,LEVL IV        Plan yearly ultrasound     (I10) Hypertension goal BP (blood pressure) < 140/90  Comment: elevated blood pressure today - he did not feel well with additional medication   Plan: chlorthalidone (HYGROTON) 25 MG tablet,         OFFICE/OUTPT VISIT,EST,LEVL IV        Offered alternative medication and/or follow-up - follow-up per AVS     (G47.33) NORMA (obstructive sleep apnea)  Comment: patient no longer wants treatment for this   Plan: OFFICE/OUTPT VISIT,EST,LEVL IV        Follow       COUNSELING:  Reviewed preventive health counseling, as reflected in patient instructions  Special attention given to:       Regular exercise       Healthy diet/nutrition       Vision screening      BMI:   Estimated body mass index is 33.03 kg/m  as calculated from the following:    Height as of this encounter: 1.69 m (5' 6.54\").    Weight as of this encounter: 94.3 kg (208 lb).   Weight management plan: Discussed healthy diet and exercise guidelines      He reports that he quit smoking about 38 years ago. His smoking use included cigars and cigarettes. He has a " 5.00 pack-year smoking history. He has never used smokeless tobacco.      Appropriate preventive services were discussed with this patient, including applicable screening as appropriate for fall prevention, nutrition, physical activity, Tobacco-use cessation, weight loss and cognition.  Checklist reviewing preventive services available has been given to the patient.    Reviewed patients plan of care and provided an AVS. The Basic Care Plan (routine screening as documented in Health Maintenance) for Zoey meets the Care Plan requirement. This Care Plan has been established and reviewed with the Patient.          Francisca Bautista MD  [facility]    Identified Health Risks:  I have reviewed Opioid Use Disorder and Substance Use Disorder risk factors and made any needed referrals. He is at risk for lack of exercise and has been provided with information to increase physical activity for the benefit of his well-being.  The patient was counseled and encouraged to consider modifying their diet and eating habits. He was provided with information on recommended healthy diet options.

## 2023-12-11 NOTE — PATIENT INSTRUCTIONS
"Patient Education   Personalized Prevention Plan  You are due for the preventive services outlined below.  Your care team is available to assist you in scheduling these services.  If you have already completed any of these items, please share that information with your care team to update in your medical record.  Health Maintenance Due   Topic Date Due     RSV VACCINE (Pregnancy & 60+) (1 - 1-dose 60+ series) Never done     Eye Exam  02/09/2023     Flu Vaccine (1) 09/01/2023     Basic Metabolic Panel  10/31/2023     Cholesterol Lab  10/31/2023     Kidney Microalbumin Urine Test  10/31/2023     Diabetic Foot Exam  10/31/2023     A1C Lab  11/16/2023     Learning About Being Physically Active  What is physical activity?     Being physically active means doing any kind of activity that gets your body moving.  The types of physical activity that can help you get fit and stay healthy include:  Aerobic or \"cardio\" activities. These make your heart beat faster and make you breathe harder, such as brisk walking, riding a bike, or running. They strengthen your heart and lungs and build up your endurance.  Strength training activities. These make your muscles work against, or \"resist,\" something. Examples include lifting weights or doing push-ups. These activities help tone and strengthen your muscles and bones.  Stretches. These let you move your joints and muscles through their full range of motion. Stretching helps you be more flexible.  Reaching a balance between these three types of physical activity is important because each one contributes to your overall fitness.  What are the benefits of being active?  Being active is one of the best things you can do for your health. It helps you to:  Feel stronger and have more energy to do all the things you like to do.  Focus better at school or work.  Feel, think, and sleep better.  Reach and stay at a healthy weight.  Lose fat and build lean muscle.  Lower your risk for serious " "health problems, including diabetes, heart attack, high blood pressure, and some cancers.  Keep your heart, lungs, bones, muscles, and joints strong and healthy.  How can you make being active part of your life?  Start slowly. Make it your long-term goal to get at least 30 minutes of exercise on most days of the week. Walking is a good choice. You also may want to do other activities, such as running, swimming, cycling, or playing tennis or team sports.  Pick activities that you like--ones that make your heart beat faster, your muscles stronger, and your muscles and joints more flexible. If you find more than one thing you like doing, do them all. You don't have to do the same thing every day.  Get your heart pumping every day. Any activity that makes your heart beat faster and keeps it at that rate for a while counts.  Here are some great ways to get your heart beating faster:  Go for a brisk walk, run, or hike.  Go for a swim or bike ride.  Take an online exercise class or dance.  Play a game of touch football, basketball, or soccer.  Play tennis, pickleball, or racquetball.  Climb stairs.  Even some household chores can be aerobic. Just do them at a faster pace. Raking or mowing the lawn, sweeping the garage, and vacuuming and cleaning your home all can help get your heart rate up.  Strengthen your muscles during the week. You don't have to lift heavy weights or grow big, bulky muscles to get stronger. Doing a few simple activities that make your muscles work against, or \"resist,\" something can help you get stronger. Aim for at least twice a week.  For example, you can:  Do push-ups or sit-ups, which use your own body weight as resistance.  Lift weights or dumbbells or use stretch bands at home or in a gym or community center.  Stretch your muscles often. Stretching will help you as you become more active. It can help you stay flexible and loosen tight muscles. It can also help improve your balance and posture " "and can be a great way to relax.  Be sure to stretch the muscles you'll be using when you work out. It's best to warm your muscles slightly before you stretch them. Walk or do some other light aerobic activity for a few minutes. Then start stretching.  When you stretch your muscles:  Do it slowly. Stretching is not about going fast or making sudden movements.  Don't push or bounce during a stretch.  Hold each stretch for at least 15 to 30 seconds, if you can. You should feel a stretch in the muscle, but not pain.  Breathe out as you do the stretch. Then breathe in as you hold the stretch. Don't hold your breath.  If you're worried about how more activity might affect your health, have a checkup before you start. Follow any special advice your doctor gives you for getting a smart start.  Where can you learn more?  Go to https://www.[employer].com.[employer]/patiented  Enter W332 in the search box to learn more about \"Learning About Being Physically Active.\"  Current as of: June 6, 2023               Content Version: 13.8    4547-5373 [employer].   Care instructions adapted under license by your healthcare professional. If you have questions about a medical condition or this instruction, always ask your healthcare professional. [employer] disclaims any warranty or liability for your use of this information.      Learning About Dietary Guidelines  What are the Dietary Guidelines for Americans?     Dietary Guidelines for Americans provide tips for eating well and staying healthy. This helps reduce the risk for long-term (chronic) diseases.  These guidelines recommend that you:  Eat and drink the right amount for you. The U.[place] government's food guide is called MyPlate. It can help you make your own well-balanced eating plan.  Try to balance your eating with your activity. This helps you stay at a healthy weight.  Drink alcohol in moderation, if at all.  Limit foods high in salt, saturated fat, trans fat, " "and added sugar.  These guidelines are from the U.S. Department of Agriculture and the U.S. Department of Health and Human Services. They are updated every 5 years.  What is MyPlate?  MyPlate is the U.[place]. government's food guide. It can help you make your own well-balanced eating plan. A balanced eating plan means that you eat enough, but not too much, and that your food gives you the nutrients you need to stay healthy.  MyPlate focuses on eating plenty of whole grains, fruits, and vegetables, and on limiting fat and sugar. It is available online at www.ChooseMyPlate.gov.  How can you get started?  If you're trying to eat healthier, you can slowly change your eating habits over time. You don't have to make big changes all at once. Start by adding one or two healthy foods to your meals each day.  Grains  Choose whole-grain breads and cereals and whole-wheat pasta and whole-grain crackers.  Vegetables  Eat a variety of vegetables every day. They have lots of nutrients and are part of a heart-healthy diet.  Fruits  Eat a variety of fruits every day. Fruits contain lots of nutrients. Choose fresh fruit instead of fruit juice.  Protein foods  Choose fish and lean poultry more often. Eat red meat and fried meats less often. Dried beans, tofu, and nuts are also good sources of protein.  Dairy  Choose low-fat or fat-free products from this food group. If you have problems digesting milk, try eating cheese or yogurt instead.  Fats and oils  Limit fats and oils if you're trying to cut calories. Choose healthy fats when you cook. These include canola oil and olive oil.  Where can you learn more?  Go to https://www.[employer].net/patiented  Enter D676 in the search box to learn more about \"Learning About Dietary Guidelines.\"  Current as of: February 28, 2023               Content Version: 13.8    6913-5306 [employer].   Care instructions adapted under license by your healthcare professional. If you have questions " about a medical condition or this instruction, always ask your healthcare professional. [employer] disclaims any warranty or liability for your use of this information.

## 2023-12-11 NOTE — RESULT ENCOUNTER NOTE
Please call patient:    Your knee xray does not show arthritis but some fluid. I recommend seeing an orthopedic surgery or sports medicine specialist for further evaluation and have placed a referral for scheduling outreach.     Francisca Bautista MD

## 2023-12-12 PROBLEM — E87.1 HYPONATREMIA: Status: ACTIVE | Noted: 2023-12-12

## 2023-12-12 LAB
CREAT UR-MCNC: 97.9 MG/DL
MICROALBUMIN UR-MCNC: 289 MG/L
MICROALBUMIN/CREAT UR: 295.2 MG/G CR (ref 0–17)

## 2023-12-12 NOTE — RESULT ENCOUNTER NOTE
Mann,    Your kidney tests are stable. Cholesterol and blood glucose are controlled. Let's repeat your sodium and calcium tests with your next lab draw.     Francisca Bautista MD

## 2023-12-22 DIAGNOSIS — I10 HTN, GOAL BELOW 130/80: ICD-10-CM

## 2023-12-22 RX ORDER — DILTIAZEM HYDROCHLORIDE EXTENDED-RELEASE TABLETS 240 MG/1
240 TABLET, EXTENDED RELEASE ORAL DAILY
Qty: 90 TABLET | Refills: 0 | Status: SHIPPED | OUTPATIENT
Start: 2023-12-22 | End: 2024-03-06

## 2024-01-08 ENCOUNTER — OFFICE VISIT (OUTPATIENT)
Dept: ORTHOPEDICS | Facility: CLINIC | Age: 84
End: 2024-01-08
Attending: FAMILY MEDICINE
Payer: COMMERCIAL

## 2024-01-08 VITALS
BODY MASS INDEX: 32.65 KG/M2 | RESPIRATION RATE: 18 BRPM | HEIGHT: 67 IN | SYSTOLIC BLOOD PRESSURE: 179 MMHG | DIASTOLIC BLOOD PRESSURE: 91 MMHG | WEIGHT: 208 LBS | HEART RATE: 100 BPM

## 2024-01-08 DIAGNOSIS — M25.562 CHRONIC PAIN OF LEFT KNEE: ICD-10-CM

## 2024-01-08 DIAGNOSIS — M17.12 PRIMARY OSTEOARTHRITIS OF LEFT KNEE: Primary | ICD-10-CM

## 2024-01-08 DIAGNOSIS — G89.29 CHRONIC PAIN OF LEFT KNEE: ICD-10-CM

## 2024-01-08 PROCEDURE — 99203 OFFICE O/P NEW LOW 30 MIN: CPT | Mod: 25 | Performed by: ORTHOPAEDIC SURGERY

## 2024-01-08 PROCEDURE — 20610 DRAIN/INJ JOINT/BURSA W/O US: CPT | Mod: LT | Performed by: ORTHOPAEDIC SURGERY

## 2024-01-08 RX ORDER — METHYLPREDNISOLONE ACETATE 80 MG/ML
80 INJECTION, SUSPENSION INTRA-ARTICULAR; INTRALESIONAL; INTRAMUSCULAR; SOFT TISSUE
Status: DISCONTINUED | OUTPATIENT
Start: 2024-01-08 | End: 2024-05-22

## 2024-01-08 RX ORDER — LIDOCAINE HYDROCHLORIDE 10 MG/ML
5 INJECTION, SOLUTION INFILTRATION; PERINEURAL
Status: DISCONTINUED | OUTPATIENT
Start: 2024-01-08 | End: 2024-05-22

## 2024-01-08 RX ADMIN — LIDOCAINE HYDROCHLORIDE 5 ML: 10 INJECTION, SOLUTION INFILTRATION; PERINEURAL at 08:59

## 2024-01-08 RX ADMIN — METHYLPREDNISOLONE ACETATE 80 MG: 80 INJECTION, SUSPENSION INTRA-ARTICULAR; INTRALESIONAL; INTRAMUSCULAR; SOFT TISSUE at 08:59

## 2024-01-08 NOTE — LETTER
1/8/2024         RE: Zoey Romo  [address] [id number]  [facility] [address]        Dear Colleague,    Thank you for referring your patient, Zoey Romo, to the [facility]. Please see a copy of my visit note below.    Large Joint Injection/Arthocentesis: L knee joint    Date/Time: 1/8/2024 8:59 AM    Performed by: Yash Valverde MD  Authorized by: Yash Valverde MD    Indications:  Pain  Needle Size:  22 G  Guidance: landmark guided    Location:  Knee      Medications:  5 mL lidocaine 1 %; 80 mg methylPREDNISolone 80 MG/ML  Outcome:  Tolerated well, no immediate complications  Procedure discussed: discussed risks, benefits, and alternatives    Consent Given by:  Patient  Timeout: timeout called immediately prior to procedure    Prep: patient was prepped and draped in usual sterile fashion          Zoey Romo is a 83 year old male who is seen in consultation at the request of Dr. Bautista for left knee pain.  This has come on gradually with time.  He has noted problems mainly with going up and down from his deer stand this past fall.  Walking gives him trouble, sitting still helps.  He has aching pain rated up to 7 out of 10.  He generally does not use anti-inflammatories for it.  He does have borderline elevated creatinine and diabetes.    X-ray shows mild medial joint narrowing and arthritis.  There is also irregularity especially at the lateral patellofemoral joint.    Past Medical History:   Diagnosis Date     AAA (abdominal aortic aneurysm) (H24)      Arthritis      AV block 10/28/2021     Chronic gout without tophus, unspecified cause, unspecified site 01/15/2016     Diabetic polyneuropathy associated with type 2 diabetes mellitus (H) 12/11/2023     Eczema 11/22/2013     GERD (gastroesophageal reflux disease)      Gout 01/27/2012     History of basal cell cancer 04/26/2019     History of colonic polyps 05/05/2016     Hypercalcemia 03/13/2015    From  chlorthalidone.     Hyperlipidemia      Hypertension goal BP (blood pressure) < 140/90 2011     Impotence     Partial / ED     Lumbar spinal stenosis      Nonsenile cataract      Obesity      NORMA (obstructive sleep apnea) 2011     Primary hyperparathyroidism (H24) 2021     Type 2 diabetes mellitus with stage 3a chronic kidney disease, without long-term current use of insulin (H) 2015       Past Surgical History:   Procedure Laterality Date     BIOPSY       BLEPHAROPLASTY BILATERAL Bilateral 2022    Procedure: Blepharoplasty bilateral;  Surgeon: Wilson Scruggs MD;  Location: [facility]     COLONOSCOPY       LUMBAR DISC SURGERY       OPTICAL TRACKING SYSTEM FUSION POSTERIOR SPINE LUMBAR  2013    Procedure: OPTICAL TRACKING SYSTEM FUSION SPINE POSTERIOR LUMBAR ONE LEVEL;  Lumbar 4-5 Laminectomy, Decompression; Lumbar 4-5 Transforaminal Lumbar Interbody Fusion ;  Surgeon: Saurav Valdez MD;  Location: [facility]     REPAIR PTOSIS BILATERAL Bilateral 2022    Procedure: both upper eyelid and ptosis repair;  Surgeon: Wilson Scruggs MD;  Location: [facility]       Family History   Problem Relation Age of Onset     Heart Disease Father      Cancer Paternal Grandfather         leukemia      Heart Disease Brother      Diabetes Brother        Social History     Socioeconomic History     Marital status:      Spouse name: Felicita     Number of children: 3     Years of education: 12     Highest education level: Not on file   Occupational History     Employer: RETIRED     Occupation: retired salesman   Tobacco Use     Smoking status: Former     Packs/day: 0.50     Years: 10.00     Additional pack years: 0.00     Total pack years: 5.00     Types: Cigars, Cigarettes     Quit date: 1985     Years since quittin.9     Smokeless tobacco: Never   Vaping Use     Vaping Use: Never used   Substance and Sexual Activity     Alcohol use: Yes     Alcohol/week: 5.8 standard  drinks of alcohol     Comment: occasional      Drug use: No     Sexual activity: Not Currently     Partners: Female   Other Topics Concern     Parent/sibling w/ CABG, MI or angioplasty before 65F 55M? No   Social History Narrative     Not on file     Social Determinants of Health     Financial Resource Strain: Low Risk  (12/4/2023)    Financial Resource Strain      Within the past 12 months, have you or your family members you live with been unable to get utilities (heat, electricity) when it was really needed?: No   Food Insecurity: Low Risk  (12/4/2023)    Food Insecurity      Within the past 12 months, did you worry that your food would run out before you got money to buy more?: No      Within the past 12 months, did the food you bought just not last and you didn t have money to get more?: No   Transportation Needs: Low Risk  (12/4/2023)    Transportation Needs      Within the past 12 months, has lack of transportation kept you from medical appointments, getting your medicines, non-medical meetings or appointments, work, or from getting things that you need?: No   Physical Activity: Not on file   Stress: Not on file   Social Connections: Not on file   Interpersonal Safety: Low Risk  (12/11/2023)    Interpersonal Safety      Do you feel physically and emotionally safe where you currently live?: Yes      Within the past 12 months, have you been hit, slapped, kicked or otherwise physically hurt by someone?: No      Within the past 12 months, have you been humiliated or emotionally abused in other ways by your partner or ex-partner?: No   Housing Stability: Low Risk  (12/4/2023)    Housing Stability      Do you have housing? : Yes      Are you worried about losing your housing?: No       Current Outpatient Medications   Medication Sig Dispense Refill     allopurinol (ZYLOPRIM) 100 MG tablet Take 1 tablet (100 mg) by mouth daily 90 tablet 3     ammonium lactate (LAC-HYDRIN) 12 % external cream Apply topically 2 times  daily as needed for dry skin 385 g 3     aspirin 81 MG tablet Take 1 tablet by mouth daily.       blood glucose (NO BRAND SPECIFIED) lancets standard Use to test blood sugar 1 times daily or as directed. 100 each 3     blood glucose (NO BRAND SPECIFIED) test strip Use to test blood sugar 1 times daily or as directed. One touch or per patient's insurance 100 strip 1     chlorthalidone (HYGROTON) 25 MG tablet Take 1 tablet (25 mg) by mouth daily 90 tablet 3     diltiazem ER (CARDIAZEM LA) 240 MG 24 hr tablet TAKE 1 TABLET DAILY 90 tablet 0     Glucosamine-Chondroit-Vit C-Mn (GLUCOSAMINE-CHONDROITIN MAX ST PO) Take by mouth 2 times daily 1500/1103mg       lisinopril (ZESTRIL) 40 MG tablet TAKE 1 TABLET DAILY 90 tablet 3     metFORMIN (GLUCOPHAGE) 500 MG tablet Take 1 tablet (500 mg) by mouth daily (with dinner) 90 tablet 3     mometasone (ELOCON) 0.1 % external cream APPLY SPARINGLY TO AFFECTED AREA TWICE A DAY AS NEEDED (DO NOT APPLY TO FACE) 45 g 14     pravastatin (PRAVACHOL) 80 MG tablet Take 1 tablet (80 mg) by mouth daily 90 tablet 3     triamcinolone (KENALOG) 0.1 % external cream PLEASE SEE ATTACHED FOR DETAILED DIRECTIONS       Vitamin D3 (CHOLECALCIFEROL) 25 mcg (1000 units) tablet Take 25 mcg by mouth daily         Allergies   Allergen Reactions     Bee Pollen Other (See Comments)     Terazosin      Dry eyes, dizziness       REVIEW OF SYSTEMS:  CONSTITUTIONAL:  NEGATIVE for fever, chills, change in weight, not feeling tired  SKIN:  NEGATIVE for worrisome rashes, no skin lumps, no skin ulcers and no non-healing wounds  EYES:  NEGATIVE for vision changes or irritation.  ENT/MOUTH:  NEGATIVE.  No hearing loss, no hoarseness, no difficulty swallowing.  RESP:  NEGATIVE. No cough or shortness of breath.  CV:  NEGATIVE for chest pain, palpitations or peripheral edema  GI:  NEGATIVE for nausea, abdominal pain, heartburn, or change in bowel habits  :  Negative. No dysuria, no hematuria  MUSCULOSKELETAL:  See HPI  "above  NEURO:  NEGATIVE . No headaches, no dizziness,  no numbness  ENDOCRINE:  NEGATIVE for temperature intolerance, skin/hair changes  HEME/ALLERGY/IMMUNE:  NEGATIVE for bleeding problems  PSYCHIATRIC:  NEGATIVE. no anxiety, no depression.     Exam:  Vitals: BP (!) 179/91   Pulse 100   Resp 18   Ht 1.689 m (5' 6.5\")   Wt 94.3 kg (208 lb)   BMI 33.07 kg/m    BMI= Body mass index is 33.07 kg/m .  Constitutional:  healthy, alert and no distress  Neuro: Alert and Oriented x 3, no focal defects.  Psych: Affect normal   Respiratory: Breathing not labored.  Cardiovascular: normal peripheral pulses  Lymph: no adenopathy  Skin: No rashes,worrisome lesions or skin problems  He has good range of motion of the knees from 0 to 125 degrees.  He does have some patellofemoral crepitation on both knees.  He has tenderness with medial lateral grind on the left patella.  He has minimal joint line tenderness on either knee.  He has no effusions.  He had negative medial and lateral Hayley's.  Sensation, motor and circulation are intact.    Assessment:  bilateral patello-femoral osteoarthritis.  Left is symptomatic.  Plan: We discussed that he will have pain mainly with going up and down stairs and getting up from chairs.  He knows to avoid kneeling and crawling.  We discussed Voltaren gel as a way to get anti-inflammatory use without stressing his kidneys.  He  desires injection today of left knee(s).  Risks, benefits, potential complications and alternatives were discussed.   With the patient's consent, sterile prep was performed of left knee(s).  Left knee was injected with Depo Medrol 80 mg and lidocaine at anteromedial site.  Return to clinic as needed.        Again, thank you for allowing me to participate in the care of your patient.        Sincerely,        Yash Valverde MD  "

## 2024-01-08 NOTE — PROGRESS NOTES
Zoey Romo is a 83 year old male who is seen in consultation at the request of Dr. Bautista for left knee pain.  This has come on gradually with time.  He has noted problems mainly with going up and down from his deer stand this past fall.  Walking gives him trouble, sitting still helps.  He has aching pain rated up to 7 out of 10.  He generally does not use anti-inflammatories for it.  He does have borderline elevated creatinine and diabetes.    X-ray shows mild medial joint narrowing and arthritis.  There is also irregularity especially at the lateral patellofemoral joint.    Past Medical History:   Diagnosis Date    AAA (abdominal aortic aneurysm) (H24)     Arthritis     AV block 10/28/2021    Chronic gout without tophus, unspecified cause, unspecified site 01/15/2016    Diabetic polyneuropathy associated with type 2 diabetes mellitus (H) 12/11/2023    Eczema 11/22/2013    GERD (gastroesophageal reflux disease)     Gout 01/27/2012    History of basal cell cancer 04/26/2019    History of colonic polyps 05/05/2016    Hypercalcemia 03/13/2015    From chlorthalidone.    Hyperlipidemia     Hypertension goal BP (blood pressure) < 140/90 01/20/2011    Impotence     Partial / ED    Lumbar spinal stenosis     Nonsenile cataract     Obesity     NORMA (obstructive sleep apnea) 03/18/2011    Primary hyperparathyroidism (H24) 09/06/2021    Type 2 diabetes mellitus with stage 3a chronic kidney disease, without long-term current use of insulin (H) 07/28/2015       Past Surgical History:   Procedure Laterality Date    BIOPSY      BLEPHAROPLASTY BILATERAL Bilateral 03/21/2022    Procedure: Blepharoplasty bilateral;  Surgeon: Wilson Scruggs MD;  Location: [facility]    COLONOSCOPY      LUMBAR DISC SURGERY  2012    OPTICAL TRACKING SYSTEM FUSION POSTERIOR SPINE LUMBAR  04/29/2013    Procedure: OPTICAL TRACKING SYSTEM FUSION SPINE POSTERIOR LUMBAR ONE LEVEL;  Lumbar 4-5 Laminectomy, Decompression; Lumbar 4-5 Transforaminal  Lumbar Interbody Fusion ;  Surgeon: Saurav Valdez MD;  [facility]: [facility]    REPAIR PTOSIS BILATERAL Bilateral 2022    Procedure: both upper eyelid and ptosis repair;  Surgeon: Wilson Scruggs MD;  Location: [facility]       Family History   Problem Relation Age of Onset    Heart Disease Father     Cancer Paternal Grandfather         leukemia     Heart Disease Brother     Diabetes Brother        Social History     Socioeconomic History    Marital status:      Spouse name: Felicita    Number of children: 3    Years of education: 12    Highest education level: Not on file   Occupational History     Employer: RETIRED    Occupation: retired salesman   Tobacco Use    Smoking status: Former     Packs/day: 0.50     Years: 10.00     Additional pack years: 0.00     Total pack years: 5.00     Types: Cigars, Cigarettes     Quit date: 1985     Years since quittin.9    Smokeless tobacco: Never   Vaping Use    Vaping Use: Never used   Substance and Sexual Activity    Alcohol use: Yes     Alcohol/week: 5.8 standard drinks of alcohol     Comment: occasional     Drug use: No    Sexual activity: Not Currently     Partners: Female   Other Topics Concern    Parent/sibling w/ CABG, MI or angioplasty before 65F 55M? No   Social History Narrative    Not on file     Social Determinants of Health     Financial Resource Strain: Low Risk  (2023)    Financial Resource Strain     Within the past 12 months, have you or your family members you live with been unable to get utilities (heat, electricity) when it was really needed?: No   Food Insecurity: Low Risk  (2023)    Food Insecurity     Within the past 12 months, did you worry that your food would run out before you got money to buy more?: No     Within the past 12 months, did the food you bought just not last and you didn t have money to get more?: No   Transportation Needs: Low Risk  (2023)    Transportation Needs     Within the past 12 months,  has lack of transportation kept you from medical appointments, getting your medicines, non-medical meetings or appointments, work, or from getting things that you need?: No   Physical Activity: Not on file   Stress: Not on file   Social Connections: Not on file   Interpersonal Safety: Low Risk  (12/11/2023)    Interpersonal Safety     Do you feel physically and emotionally safe where you currently live?: Yes     Within the past 12 months, have you been hit, slapped, kicked or otherwise physically hurt by someone?: No     Within the past 12 months, have you been humiliated or emotionally abused in other ways by your partner or ex-partner?: No   Housing Stability: Low Risk  (12/4/2023)    Housing Stability     Do you have housing? : Yes     Are you worried about losing your housing?: No       Current Outpatient Medications   Medication Sig Dispense Refill    allopurinol (ZYLOPRIM) 100 MG tablet Take 1 tablet (100 mg) by mouth daily 90 tablet 3    ammonium lactate (LAC-HYDRIN) 12 % external cream Apply topically 2 times daily as needed for dry skin 385 g 3    aspirin 81 MG tablet Take 1 tablet by mouth daily.      blood glucose (NO BRAND SPECIFIED) lancets standard Use to test blood sugar 1 times daily or as directed. 100 each 3    blood glucose (NO BRAND SPECIFIED) test strip Use to test blood sugar 1 times daily or as directed. One touch or per patient's insurance 100 strip 1    chlorthalidone (HYGROTON) 25 MG tablet Take 1 tablet (25 mg) by mouth daily 90 tablet 3    diltiazem ER (CARDIAZEM LA) 240 MG 24 hr tablet TAKE 1 TABLET DAILY 90 tablet 0    Glucosamine-Chondroit-Vit C-Mn (GLUCOSAMINE-CHONDROITIN MAX ST PO) Take by mouth 2 times daily 1500/1103mg      lisinopril (ZESTRIL) 40 MG tablet TAKE 1 TABLET DAILY 90 tablet 3    metFORMIN (GLUCOPHAGE) 500 MG tablet Take 1 tablet (500 mg) by mouth daily (with dinner) 90 tablet 3    mometasone (ELOCON) 0.1 % external cream APPLY SPARINGLY TO AFFECTED AREA TWICE A DAY AS  "NEEDED (DO NOT APPLY TO FACE) 45 g 14    pravastatin (PRAVACHOL) 80 MG tablet Take 1 tablet (80 mg) by mouth daily 90 tablet 3    triamcinolone (KENALOG) 0.1 % external cream PLEASE SEE ATTACHED FOR DETAILED DIRECTIONS      Vitamin D3 (CHOLECALCIFEROL) 25 mcg (1000 units) tablet Take 25 mcg by mouth daily         Allergies   Allergen Reactions    Bee Pollen Other (See Comments)    Terazosin      Dry eyes, dizziness       REVIEW OF SYSTEMS:  CONSTITUTIONAL:  NEGATIVE for fever, chills, change in weight, not feeling tired  SKIN:  NEGATIVE for worrisome rashes, no skin lumps, no skin ulcers and no non-healing wounds  EYES:  NEGATIVE for vision changes or irritation.  ENT/MOUTH:  NEGATIVE.  No hearing loss, no hoarseness, no difficulty swallowing.  RESP:  NEGATIVE. No cough or shortness of breath.  CV:  NEGATIVE for chest pain, palpitations or peripheral edema  GI:  NEGATIVE for nausea, abdominal pain, heartburn, or change in bowel habits  :  Negative. No dysuria, no hematuria  MUSCULOSKELETAL:  See HPI above  NEURO:  NEGATIVE . No headaches, no dizziness,  no numbness  ENDOCRINE:  NEGATIVE for temperature intolerance, skin/hair changes  HEME/ALLERGY/IMMUNE:  NEGATIVE for bleeding problems  PSYCHIATRIC:  NEGATIVE. no anxiety, no depression.     Exam:  Vitals: BP (!) 179/91   Pulse 100   Resp 18   Ht 1.689 m (5' 6.5\")   Wt 94.3 kg (208 lb)   BMI 33.07 kg/m    BMI= Body mass index is 33.07 kg/m .  Constitutional:  healthy, alert and no distress  Neuro: Alert and Oriented x 3, no focal defects.  Psych: Affect normal   Respiratory: Breathing not labored.  Cardiovascular: normal peripheral pulses  Lymph: no adenopathy  Skin: No rashes,worrisome lesions or skin problems  He has good range of motion of the knees from 0 to 125 degrees.  He does have some patellofemoral crepitation on both knees.  He has tenderness with medial lateral grind on the left patella.  He has minimal joint line tenderness on either knee.  He has " no effusions.  He had negative medial and lateral Hayley's.  Sensation, motor and circulation are intact.    Assessment:  bilateral patello-femoral osteoarthritis.  Left is symptomatic.  Plan: We discussed that he will have pain mainly with going up and down stairs and getting up from chairs.  He knows to avoid kneeling and crawling.  We discussed Voltaren gel as a way to get anti-inflammatory use without stressing his kidneys.  He  desires injection today of left knee(s).  Risks, benefits, potential complications and alternatives were discussed.   With the patient's consent, sterile prep was performed of left knee(s).  Left knee was injected with Depo Medrol 80 mg and lidocaine at anteromedial site.  Return to clinic as needed.

## 2024-01-08 NOTE — PROGRESS NOTES
Large Joint Injection/Arthocentesis: L knee joint    Date/Time: 1/8/2024 8:59 AM    Performed by: Yash Valverde MD  Authorized by: Yash Valverde MD    Indications:  Pain  Needle Size:  22 G  Guidance: landmark guided    Location:  Knee      Medications:  5 mL lidocaine 1 %; 80 mg methylPREDNISolone 80 MG/ML  Outcome:  Tolerated well, no immediate complications  Procedure discussed: discussed risks, benefits, and alternatives    Consent Given by:  Patient  Timeout: timeout called immediately prior to procedure    Prep: patient was prepped and draped in usual sterile fashion

## 2024-01-08 NOTE — PATIENT INSTRUCTIONS
Mann to follow up with Primary Care provider regarding elevated blood pressure.    Options for osteoarthritis.    Weight loss.  Legs feel better the lighter you are.  Stay active - walking, bicycle, swimming.  Avoid high impact.  Vitamins - Glucosamine 1500 mg/day and chondroitin sulfate 1200 mg/day. It works for dogs and horses, no proof it helps people.  Non-medical options (other things I've heard of):  * tart cherry juice is thought to be a natural anti-inflammatory.    *1 tablespoon of apple cider vinegar daily  *Turmeric is thought to be a natural anti-inflammatory.  *1 tablespoon of unflavored gelatin daily, [place] Gelatin or [place]rera Nutrajoint  *Ointments on joints - Icy Hot, BenGay, Capsaicin cream, Mineral Ice, Flexall, Voltaren gel.  Tylenol up to 3000 mg / day.  NSAIDs - Aleve up to 4 tablets per day or ibuprofen up to 12 tablets per day.   Prescription forms.  Steroid injection - cortisone.  Lubricant injection.   brace.  Surgery.  Usually joint replacement.    You have had a steroid injection today.  For the first 2 hours there will likely be some numbing in the joint from the lidocaine.  This is a good sign, indicating that the injection is in the right place.  In 2 hours the lidocaine will wear off, and the joint will hurt like you had a shot.  Each day the cortisone makes it feel better.  It reaches peak effect in 2 weeks.  We expect it to last for 3 months.  You may resume regular activity when you feel ready.  If you are diabetic, your glucoses will be quite high for several days.

## 2024-01-30 DIAGNOSIS — I71.40 ABDOMINAL AORTIC ANEURYSM (AAA) WITHOUT RUPTURE, UNSPECIFIED PART (H): ICD-10-CM

## 2024-01-30 DIAGNOSIS — E78.5 HYPERLIPIDEMIA LDL GOAL <100: ICD-10-CM

## 2024-01-30 RX ORDER — PRAVASTATIN SODIUM 80 MG/1
80 TABLET ORAL DAILY
Qty: 90 TABLET | Refills: 2 | Status: SHIPPED | OUTPATIENT
Start: 2024-01-30 | End: 2024-08-07

## 2024-02-27 ENCOUNTER — TELEPHONE (OUTPATIENT)
Dept: FAMILY MEDICINE | Facility: CLINIC | Age: 84
End: 2024-02-27
Payer: COMMERCIAL

## 2024-02-27 ENCOUNTER — MYC MEDICAL ADVICE (OUTPATIENT)
Dept: FAMILY MEDICINE | Facility: CLINIC | Age: 84
End: 2024-02-27
Payer: COMMERCIAL

## 2024-02-27 NOTE — TELEPHONE ENCOUNTER
Patient Quality Outreach    Patient is due for the following:   Diabetes -  Eye Exam  Hypertension -  BP check    Next Steps:   Schedule a nurse only visit for blood pressure    Type of outreach:    Sent [employer] message.    Next Steps:  Reach out within 90 days via [employer].    Max number of attempts reached: Yes. Will try again in 90 days if patient still on fail list.    Questions for provider review:    None           Dolly Bergeron, [facility]  Chart routed to Care Team.

## 2024-03-06 DIAGNOSIS — I10 HTN, GOAL BELOW 130/80: ICD-10-CM

## 2024-03-06 RX ORDER — DILTIAZEM HYDROCHLORIDE EXTENDED-RELEASE TABLETS 240 MG/1
240 TABLET, EXTENDED RELEASE ORAL DAILY
Qty: 90 TABLET | Refills: 0 | Status: SHIPPED | OUTPATIENT
Start: 2024-03-06 | End: 2024-05-22

## 2024-03-21 ENCOUNTER — TELEPHONE (OUTPATIENT)
Dept: FAMILY MEDICINE | Facility: CLINIC | Age: 84
End: 2024-03-21
Payer: COMMERCIAL

## 2024-03-21 NOTE — TELEPHONE ENCOUNTER
Reason for call:  Patient reporting a symptom    Symptom or request:   COVID POSITIVE WANTS PAXLOVID  Duration (how long have symptoms been present): 1 DAY    Have you been treated for this before? No    Additional comments: WANTS TO DISCUSS PAXLOVID    Phone Number patient can be reached at:  Home number on file [phone] (home)    Best Time:  ASAP    Can we leave a detailed message on this number:  YES    Call taken on 3/21/2024 at 2:24 PM by Lisa Casillas

## 2024-03-21 NOTE — TELEPHONE ENCOUNTER
Denies any CP or SOB    Tested positive for COVID19 today on home test  On day 11 of symptoms.  Overall, symptoms improving.  Advised that he continue home remedies and OTC meds PRN  Call if symptoms persist or worsen  Patient verbalized understanding.    Karen Leblanc RN  [facility]

## 2024-05-08 ENCOUNTER — MYC MEDICAL ADVICE (OUTPATIENT)
Dept: FAMILY MEDICINE | Facility: CLINIC | Age: 84
End: 2024-05-08
Payer: COMMERCIAL

## 2024-05-08 DIAGNOSIS — H91.90 HEARING LOSS, UNSPECIFIED HEARING LOSS TYPE, UNSPECIFIED LATERALITY: ICD-10-CM

## 2024-05-08 DIAGNOSIS — H93.13 TINNITUS, BILATERAL: Primary | ICD-10-CM

## 2024-05-10 NOTE — TELEPHONE ENCOUNTER
Patient requesting audiology referral due to gradual hearing loss     Cued    Hermila Barnard RN  [facility]

## 2024-05-13 ENCOUNTER — OFFICE VISIT (OUTPATIENT)
Dept: ORTHOPEDICS | Facility: CLINIC | Age: 84
End: 2024-05-13
Payer: COMMERCIAL

## 2024-05-13 VITALS
BODY MASS INDEX: 32.65 KG/M2 | HEART RATE: 75 BPM | WEIGHT: 208 LBS | DIASTOLIC BLOOD PRESSURE: 82 MMHG | RESPIRATION RATE: 18 BRPM | HEIGHT: 67 IN | SYSTOLIC BLOOD PRESSURE: 137 MMHG

## 2024-05-13 DIAGNOSIS — M17.12 PRIMARY OSTEOARTHRITIS OF LEFT KNEE: Primary | ICD-10-CM

## 2024-05-13 PROCEDURE — 20610 DRAIN/INJ JOINT/BURSA W/O US: CPT | Mod: LT | Performed by: ORTHOPAEDIC SURGERY

## 2024-05-13 RX ORDER — LIDOCAINE HYDROCHLORIDE 10 MG/ML
5 INJECTION, SOLUTION INFILTRATION; PERINEURAL
Status: DISCONTINUED | OUTPATIENT
Start: 2024-05-13 | End: 2024-05-22

## 2024-05-13 RX ORDER — METHYLPREDNISOLONE ACETATE 80 MG/ML
80 INJECTION, SUSPENSION INTRA-ARTICULAR; INTRALESIONAL; INTRAMUSCULAR; SOFT TISSUE
Status: DISCONTINUED | OUTPATIENT
Start: 2024-05-13 | End: 2024-05-22

## 2024-05-13 RX ADMIN — METHYLPREDNISOLONE ACETATE 80 MG: 80 INJECTION, SUSPENSION INTRA-ARTICULAR; INTRALESIONAL; INTRAMUSCULAR; SOFT TISSUE at 09:07

## 2024-05-13 RX ADMIN — LIDOCAINE HYDROCHLORIDE 5 ML: 10 INJECTION, SOLUTION INFILTRATION; PERINEURAL at 09:07

## 2024-05-13 NOTE — PROGRESS NOTES
Large Joint Injection/Arthocentesis: L knee joint    Date/Time: 5/13/2024 9:07 AM    Performed by: Yash Valverde MD  Authorized by: Yash Valverde MD    Indications:  Pain  Needle Size:  22 G  Guidance: landmark guided    Location:  Knee      Medications:  5 mL lidocaine 1 %; 80 mg methylPREDNISolone 80 MG/ML  Outcome:  Tolerated well, no immediate complications  Procedure discussed: discussed risks, benefits, and alternatives    Consent Given by:  Patient  Timeout: timeout called immediately prior to procedure    Prep: patient was prepped and draped in usual sterile fashion

## 2024-05-13 NOTE — PROGRESS NOTES
Follow up left knee primary osteoarthritis.  Last injection 1/8/24.  Range of motion 0-125.    He  desires injection today of left knee(s).  Risks, benefits, potential complications and alternatives were discussed.   With the patient's consent, sterile prep was performed of left knee(s).  Left knee was injected with Depo Medrol 80 mg and lidocaine at anteromedial site.  Return to clinic as needed.

## 2024-05-13 NOTE — LETTER
5/13/2024         RE: Zoey Romo  [address] [facility] [id number]        Dear Colleague,    Thank you for referring your patient, Zoey Romo, to the [facility]. Please see a copy of my visit note below.    Large Joint Injection/Arthocentesis: L knee joint    Date/Time: 5/13/2024 9:07 AM    Performed by: Yash Valverde MD  Authorized by: Yash Valverde MD    Indications:  Pain  Needle Size:  22 G  Guidance: landmark guided    Location:  Knee      Medications:  5 mL lidocaine 1 %; 80 mg methylPREDNISolone 80 MG/ML  Outcome:  Tolerated well, no immediate complications  Procedure discussed: discussed risks, benefits, and alternatives    Consent Given by:  Patient  Timeout: timeout called immediately prior to procedure    Prep: patient was prepped and draped in usual sterile fashion          Follow up left knee primary osteoarthritis.  Last injection 1/8/24.  Range of motion 0-125.    He  desires injection today of left knee(s).  Risks, benefits, potential complications and alternatives were discussed.   With the patient's consent, sterile prep was performed of left knee(s).  Left knee was injected with Depo Medrol 80 mg and lidocaine at anteromedial site.  Return to clinic as needed.          Again, thank you for allowing me to participate in the care of your patient.        Sincerely,        Yash Valverde MD

## 2024-05-22 ENCOUNTER — OFFICE VISIT (OUTPATIENT)
Dept: FAMILY MEDICINE | Facility: CLINIC | Age: 84
End: 2024-05-22
Payer: COMMERCIAL

## 2024-05-22 VITALS
WEIGHT: 211 LBS | DIASTOLIC BLOOD PRESSURE: 74 MMHG | TEMPERATURE: 98 F | RESPIRATION RATE: 18 BRPM | HEIGHT: 67 IN | HEART RATE: 68 BPM | SYSTOLIC BLOOD PRESSURE: 132 MMHG | BODY MASS INDEX: 33.12 KG/M2 | OXYGEN SATURATION: 98 %

## 2024-05-22 DIAGNOSIS — Z29.11 NEED FOR VACCINATION AGAINST RESPIRATORY SYNCYTIAL VIRUS: ICD-10-CM

## 2024-05-22 DIAGNOSIS — E87.1 HYPONATREMIA: ICD-10-CM

## 2024-05-22 DIAGNOSIS — H91.93 HEARING DIFFICULTY OF BOTH EARS: Primary | ICD-10-CM

## 2024-05-22 DIAGNOSIS — E11.42 DIABETIC POLYNEUROPATHY ASSOCIATED WITH TYPE 2 DIABETES MELLITUS (H): ICD-10-CM

## 2024-05-22 DIAGNOSIS — M1A.9XX0 CHRONIC GOUT WITHOUT TOPHUS, UNSPECIFIED CAUSE, UNSPECIFIED SITE: ICD-10-CM

## 2024-05-22 DIAGNOSIS — E83.52 HYPERCALCEMIA: ICD-10-CM

## 2024-05-22 DIAGNOSIS — N18.31 TYPE 2 DIABETES MELLITUS WITH STAGE 3A CHRONIC KIDNEY DISEASE, WITHOUT LONG-TERM CURRENT USE OF INSULIN (H): ICD-10-CM

## 2024-05-22 DIAGNOSIS — I10 HYPERTENSION GOAL BP (BLOOD PRESSURE) < 140/90: ICD-10-CM

## 2024-05-22 DIAGNOSIS — I71.40 ABDOMINAL AORTIC ANEURYSM (AAA) WITHOUT RUPTURE, UNSPECIFIED PART (H): ICD-10-CM

## 2024-05-22 DIAGNOSIS — E11.22 TYPE 2 DIABETES MELLITUS WITH STAGE 3A CHRONIC KIDNEY DISEASE, WITHOUT LONG-TERM CURRENT USE OF INSULIN (H): ICD-10-CM

## 2024-05-22 LAB
ANION GAP SERPL CALCULATED.3IONS-SCNC: 10 MMOL/L (ref 7–15)
BUN SERPL-MCNC: 22.7 MG/DL (ref 8–23)
CA-I BLD-MCNC: 5.2 MG/DL (ref 4.4–5.2)
CALCIUM SERPL-MCNC: 9.3 MG/DL (ref 8.8–10.2)
CHLORIDE SERPL-SCNC: 96 MMOL/L (ref 98–107)
CREAT SERPL-MCNC: 1.16 MG/DL (ref 0.67–1.17)
DEPRECATED HCO3 PLAS-SCNC: 26 MMOL/L (ref 22–29)
EGFRCR SERPLBLD CKD-EPI 2021: 62 ML/MIN/1.73M2
GLUCOSE SERPL-MCNC: 138 MG/DL (ref 70–99)
HBA1C MFR BLD: 6.6 % (ref 0–5.6)
HGB BLD-MCNC: 13.9 G/DL (ref 13.3–17.7)
POTASSIUM SERPL-SCNC: 3.6 MMOL/L (ref 3.4–5.3)
SODIUM SERPL-SCNC: 132 MMOL/L (ref 135–145)
URATE SERPL-MCNC: 5.1 MG/DL (ref 3.4–7)

## 2024-05-22 PROCEDURE — 80048 BASIC METABOLIC PNL TOTAL CA: CPT | Performed by: FAMILY MEDICINE

## 2024-05-22 PROCEDURE — 91320 SARSCV2 VAC 30MCG TRS-SUC IM: CPT | Performed by: FAMILY MEDICINE

## 2024-05-22 PROCEDURE — 99214 OFFICE O/P EST MOD 30 MIN: CPT | Mod: 25 | Performed by: FAMILY MEDICINE

## 2024-05-22 PROCEDURE — 90480 ADMN SARSCOV2 VAC 1/ONLY CMP: CPT | Performed by: FAMILY MEDICINE

## 2024-05-22 PROCEDURE — 83036 HEMOGLOBIN GLYCOSYLATED A1C: CPT | Performed by: FAMILY MEDICINE

## 2024-05-22 PROCEDURE — 85018 HEMOGLOBIN: CPT | Performed by: FAMILY MEDICINE

## 2024-05-22 PROCEDURE — 84550 ASSAY OF BLOOD/URIC ACID: CPT | Performed by: FAMILY MEDICINE

## 2024-05-22 PROCEDURE — 82330 ASSAY OF CALCIUM: CPT | Performed by: FAMILY MEDICINE

## 2024-05-22 PROCEDURE — 36415 COLL VENOUS BLD VENIPUNCTURE: CPT | Performed by: FAMILY MEDICINE

## 2024-05-22 RX ORDER — ALLOPURINOL 100 MG/1
100 TABLET ORAL DAILY
Qty: 90 TABLET | Refills: 3 | Status: SHIPPED | OUTPATIENT
Start: 2024-05-22

## 2024-05-22 RX ORDER — RESPIRATORY SYNCYTIAL VIRUS VACCINE 120MCG/0.5
0.5 KIT INTRAMUSCULAR ONCE
Qty: 1 EACH | Refills: 0 | Status: SHIPPED | OUTPATIENT
Start: 2024-05-22 | End: 2024-05-22

## 2024-05-22 RX ORDER — DILTIAZEM HYDROCHLORIDE EXTENDED-RELEASE TABLETS 240 MG/1
240 TABLET, EXTENDED RELEASE ORAL DAILY
Qty: 90 TABLET | Refills: 3 | Status: SHIPPED | OUTPATIENT
Start: 2024-05-22

## 2024-05-22 NOTE — PROGRESS NOTES
Assessment & Plan     Hearing difficulty of both ears  - Adult Audiology  Referral; Future    Type 2 diabetes mellitus with stage 3a chronic kidney disease, without long-term current use of insulin (H)  Diabetic polyneuropathy associated with type 2 diabetes mellitus (H)  - Basic metabolic panel  (Ca, Cl, CO2, Creat, Gluc, K, Na, BUN); Future  - Hemoglobin A1c; Future  - Basic metabolic panel  (Ca, Cl, CO2, Creat, Gluc, K, Na, BUN)  - Hemoglobin A1c    Hypercalcemia  - Ionized Calcium; Future  - Basic metabolic panel  (Ca, Cl, CO2, Creat, Gluc, K, Na, BUN); Future  - Ionized Calcium  - Basic metabolic panel  (Ca, Cl, CO2, Creat, Gluc, K, Na, BUN)    Abdominal aortic aneurysm (AAA) without rupture, unspecified part (H24)  Ordered ultrasound     Hypertension goal BP (blood pressure) < 140/90  Well controlled with medications without side effects.   - diltiazem ER (CARDIAZEM LA) 240 MG 24 hr tablet; Take 1 tablet (240 mg) by mouth daily  - Basic metabolic panel  (Ca, Cl, CO2, Creat, Gluc, K, Na, BUN); Future  - Basic metabolic panel  (Ca, Cl, CO2, Creat, Gluc, K, Na, BUN)    Hyponatremia  - Basic metabolic panel  (Ca, Cl, CO2, Creat, Gluc, K, Na, BUN); Future    Chronic gout without tophus, unspecified cause, unspecified site  - Uric acid; Future  - allopurinol (ZYLOPRIM) 100 MG tablet; Take 1 tablet (100 mg) by mouth daily  - Uric acid    The longitudinal plan of care for the diagnosis(es)/condition(s) as documented were addressed during this visit. Due to the added complexity in care, I will continue to support Mackenzie in the subsequent management and with ongoing continuity of care.      Subjective   Mackenzie is a 84 year old, presenting for the following health issues:  Plugged Ears (Ear Wash, Right ear. COVID in March. Had a head cold and ear feels plugged.)        5/22/2024     8:27 AM   Additional Questions   Roomed by Jasmin HUMPHREY CMA   Accompanied by Self     History of Present Illness       Reason for visit:   "Ear problems  Symptom onset:  More than a month  Symptoms include:  Hearing one ear and other not up to par  Symptom intensity:  Severe  Symptom progression:  Staying the same  Had these symptoms before:  No  What makes it worse:  No  What makes it better:  Sarina consumes 0 sweetened beverage(s) daily.He exercises with enough effort to increase his heart rate 9 or less minutes per day.  He exercises with enough effort to increase his heart rate 3 or less days per week.   He is taking medications regularly.     He also has gout, diabetes, hypercalcemia, and hyponatremia. He is due for AAA surveillance. Hypertension well controlled on current medications without side effects, chest pain, or dyspnea.           Review of Systems  CONSTITUTIONAL: NEGATIVE for fever, chills, change in weight  ENT/MOUTH: POSITIVE for hearing loss and NEGATIVE for nasal congestion, sinus pressure, and sore throat  RESP: NEGATIVE for significant cough or SOB  ENDOCRINE: Hx diabetes      Objective    /74 (BP Location: Left arm, Patient Position: Sitting, Cuff Size: Adult Large)   Pulse 68   Temp 98  F (36.7  C) (Oral)   Resp 18   Ht 1.695 m (5' 6.73\")   Wt 95.7 kg (211 lb)   SpO2 98%   BMI 33.31 kg/m    Body mass index is 33.31 kg/m .  Physical Exam   GENERAL: alert and no distress  EYES: Eyes grossly normal to inspection, PERRL and conjunctivae and sclerae normal  HENT: ear canals and TM's normal, nose and mouth without ulcers or lesions  NECK: no adenopathy, no asymmetry, masses, or scars  RESP: lungs clear to auscultation - no rales, rhonchi or wheezes  CV: regular rate and rhythm, normal S1 S2, no S3 or S4, no murmur, click or rub, no peripheral edema  MS: extremities normal- no gross deformities noted  PSYCH: mentation appears normal, affect normal/bright    Office Visit on 12/11/2023   Component Date Value Ref Range Status    Sodium 12/11/2023 131 (L)  135 - 145 mmol/L Final    Reference intervals for this test were updated " on 09/26/2023 to more accurately reflect our healthy population. There may be differences in the flagging of prior results with similar values performed with this method. Interpretation of those prior results can be made in the context of the updated reference intervals.     Potassium 12/11/2023 3.7  3.4 - 5.3 mmol/L Final    Chloride 12/11/2023 101  98 - 107 mmol/L Final    Carbon Dioxide (CO2) 12/11/2023 25  22 - 29 mmol/L Final    Anion Gap 12/11/2023 5 (L)  7 - 15 mmol/L Final    Urea Nitrogen 12/11/2023 21.0  8.0 - 23.0 mg/dL Final    Creatinine 12/11/2023 1.16  0.67 - 1.17 mg/dL Final    GFR Estimate 12/11/2023 62  >60 mL/min/1.73m2 Final    Calcium 12/11/2023 10.6 (H)  8.8 - 10.2 mg/dL Final    Glucose 12/11/2023 132 (H)  70 - 99 mg/dL Final    Cholesterol 12/11/2023 157  <200 mg/dL Final    Triglycerides 12/11/2023 92  <150 mg/dL Final    Direct Measure HDL 12/11/2023 63  >=40 mg/dL Final    LDL Cholesterol Calculated 12/11/2023 76  <=100 mg/dL Final    Non HDL Cholesterol 12/11/2023 94  <130 mg/dL Final    Patient Fasting > 8hrs? 12/11/2023 Unknown   Final    Hemoglobin A1C 12/11/2023 6.2 (H)  0.0 - 5.6 % Final    Normal <5.7%   Prediabetes 5.7-6.4%    Diabetes 6.5% or higher     Note: Adopted from ADA consensus guidelines.    Creatinine Urine mg/dL 12/11/2023 97.9  mg/dL Final    The reference ranges have not been established in urine creatinine. The results should be integrated into the clinical context for interpretation.    Albumin Urine mg/L 12/11/2023 289.0  mg/L Final    The reference ranges have not been established in urine albumin. The results should be integrated into the clinical context for interpretation.    Albumin Urine mg/g Cr 12/11/2023 295.20 (H)  0.00 - 17.00 mg/g Cr Final    Microalbuminuria is defined as an albumin:creatinine ratio of 17 to 299 for males and 25 to 299 for females. A ratio of albumin:creatinine of 300 or higher is indicative of overt proteinuria.  Due to biologic  variability, positive results should be confirmed by a second, first-morning random or 24-hour timed urine specimen. If there is discrepancy, a third specimen is recommended. When 2 out of 3 results are in the microalbuminuria range, this is evidence for incipient nephropathy and warrants increased efforts at glucose control, blood pressure control, and institution of therapy with an angiotensin-converting-enzyme (ACE) inhibitor (if the patient can tolerate it).       Results for orders placed or performed in visit on 05/22/24 (from the past 24 hour(s))   Hemoglobin   Result Value Ref Range    Hemoglobin 13.9 13.3 - 17.7 g/dL   Hemoglobin A1c   Result Value Ref Range    Hemoglobin A1C 6.6 (H) 0.0 - 5.6 %           Signed Electronically by: Francisca Bautista MD

## 2024-05-23 NOTE — RESULT ENCOUNTER NOTE
Please call patient:  Your sodium level is improving but still low. This could be a side effects to your chlorthalidone blood pressure medication. Avoid drinking too much plain water or alcohol. Let's recheck this in a month by lab check.     Your anemia, gout and calcium tests are normal. Your blood glucose is controlled.     Francisca Bautista MD

## 2024-05-24 ENCOUNTER — TELEPHONE (OUTPATIENT)
Dept: FAMILY MEDICINE | Facility: CLINIC | Age: 84
End: 2024-05-24
Payer: COMMERCIAL

## 2024-05-24 NOTE — TELEPHONE ENCOUNTER
"Called patient, left message to call back at [phone]. Called to relay result note below:    \"Please call patient:  Your sodium level is improving but still low. This could be a side effects to your chlorthalidone blood pressure medication. Avoid drinking too much plain water or alcohol. Let's recheck this in a month by lab check.     Your anemia, gout and calcium tests are normal. Your blood glucose is controlled.     Francisca Bautista MD\"    Thanks,  ANTONIA Betancourt RN  [facility]  "

## 2024-05-30 ENCOUNTER — OFFICE VISIT (OUTPATIENT)
Dept: AUDIOLOGY | Facility: CLINIC | Age: 84
End: 2024-05-30
Payer: COMMERCIAL

## 2024-05-30 DIAGNOSIS — H90.3 SENSORINEURAL HEARING LOSS, BILATERAL: Primary | ICD-10-CM

## 2024-05-30 DIAGNOSIS — H91.93 HEARING DIFFICULTY OF BOTH EARS: ICD-10-CM

## 2024-05-30 PROCEDURE — 92557 COMPREHENSIVE HEARING TEST: CPT

## 2024-05-30 PROCEDURE — 92550 TYMPANOMETRY & REFLEX THRESH: CPT

## 2024-05-30 NOTE — PROGRESS NOTES
AUDIOLOGY REPORT    SUBJECTIVE:  Zoey Romo is a 84 year old male who was seen in the Audiology Clinic at the [facility] for audiologic evaluation, referred by Francisca Bautista M.D. The patient is here today as he had concerns regarding a new onset of pulsatile tinnitus at night but it has since improved. He states it is still slightly there, but has gotten much better. Mackenzie has no concerns for his hearing and denies dizziness, history of ear surgeries, bilateral tinnitus, bilateral otalgia, bilateral drainage, and bilateral aural fullness. 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 He states his older brother was born with a hearing loss, but attributes this to a condition he had at birth. The patient was not accompanied to today's appointment     OBJECTIVE:  Abuse Screening:  Do you feel unsafe at home or work/school? No  Do you feel threatened by someone? No  Does anyone try to keep you from having contact with others, or doing things outside of your home? No  Physical signs of abuse present? No     Fall Risk Screen:  1. Have you fallen two or more times in the past year? No  2. Have you fallen and had an injury in the past year? No    Otoscopic exam indicates ears are clear of cerumen bilaterally     Pure Tone Thresholds assessed using conventional audiometry with good  reliability from 250-8000 Hz bilaterally using insert earphones and circumaural headphones     RIGHT:  mild sloping to moderate sensorineural hearing loss    LEFT:    mild sloping to moderate sensorineural hearing loss    Tympanogram:    RIGHT: normal eardrum mobility    LEFT:   normal eardrum mobility    Reflexes (reported by stimulus ear):  RIGHT: Ipsilateral is present at normal levels  RIGHT: Contralateral is absent at frequencies tested  LEFT:   Ipsilateral is absent at frequencies tested  LEFT:   Contralateral is absent at frequencies tested      Speech  Call to schedule diagnostic mammogram and breast ultrasound, [phone]. Please call Cardiac Scheduling at [phone] to schedule holter moniter testing. Please make a follow in 1 month ( blood pressure ) [phone]    To Lab for FASTING labs . [facility] Hours :    Monday - Thursday 7 AM - 6 pm  Friday 7 AM - 5 PM  Sat 7 am - 12 noon  Closed Sunday Reception Threshold:    RIGHT: 35 dB HL    LEFT:   40 dB HL  Word Recognition Score:     RIGHT: 100% at 75 dB HL using NU-6 recorded word list.    LEFT:   92% at 75 dB HL using NU-6 recorded word list.      ASSESSMENT:   Bilateral sensorineural hearing loss. Today s results were discussed with the patient in detail.     PLAN:  Patient was counseled regarding hearing loss and impact on communication. Patient is a good candidate for amplification at this time. Handout on [facility]'s hearing aid program was given to patient. It is recommended that the patient follow-up with PCP or ENT should the pulsatile tinnitus become worse. It was also recommended Mann return for a hearing aid consultation and every 2-3 years for an updated audiogram.  Please call this clinic with questions regarding these results or recommendations.        Bert Dillard, [facility]  Licensed Audiologist  [place] #[id number]      05/30/24     CC: Francisca Bautista MD

## 2024-05-30 NOTE — Clinical Note
Hi Dr. Bautista,   This patient states that he originally made the audiology appointment due to pulsatile tinnitus, however, it has since improved. He does have a hearing loss and is a hearing aid candidate, but I do not see any evidence of eustachian tube dysfunction or fluid. I recommended he follow-up with ENT should the 'heart beat' return or if it becomes worse.   Just an Bert Galeas, [facility] Licensed Audiologist [place] #[id number]

## 2024-07-09 ENCOUNTER — ANCILLARY PROCEDURE (OUTPATIENT)
Dept: ULTRASOUND IMAGING | Facility: CLINIC | Age: 84
End: 2024-07-09
Attending: FAMILY MEDICINE
Payer: COMMERCIAL

## 2024-07-09 DIAGNOSIS — I71.40 ABDOMINAL AORTIC ANEURYSM (AAA) WITHOUT RUPTURE, UNSPECIFIED PART (H): ICD-10-CM

## 2024-07-09 PROCEDURE — 76775 US EXAM ABDO BACK WALL LIM: CPT | Mod: TC | Performed by: RADIOLOGY

## 2024-07-09 NOTE — RESULT ENCOUNTER NOTE
Mann,    Your aortic aneurysm is stable. Let's check this again in a year.     Francisca Bautista MD

## 2024-08-06 SDOH — HEALTH STABILITY: PHYSICAL HEALTH: ON AVERAGE, HOW MANY MINUTES DO YOU ENGAGE IN EXERCISE AT THIS LEVEL?: 10 MIN

## 2024-08-06 SDOH — HEALTH STABILITY: PHYSICAL HEALTH: ON AVERAGE, HOW MANY DAYS PER WEEK DO YOU ENGAGE IN MODERATE TO STRENUOUS EXERCISE (LIKE A BRISK WALK)?: 2 DAYS

## 2024-08-06 ASSESSMENT — SOCIAL DETERMINANTS OF HEALTH (SDOH): HOW OFTEN DO YOU GET TOGETHER WITH FRIENDS OR RELATIVES?: TWICE A WEEK

## 2024-08-07 ENCOUNTER — OFFICE VISIT (OUTPATIENT)
Dept: FAMILY MEDICINE | Facility: CLINIC | Age: 84
End: 2024-08-07
Payer: COMMERCIAL

## 2024-08-07 VITALS
RESPIRATION RATE: 16 BRPM | HEART RATE: 65 BPM | TEMPERATURE: 97.7 F | DIASTOLIC BLOOD PRESSURE: 74 MMHG | SYSTOLIC BLOOD PRESSURE: 154 MMHG | OXYGEN SATURATION: 98 % | HEIGHT: 67 IN | BODY MASS INDEX: 33.43 KG/M2 | WEIGHT: 213 LBS

## 2024-08-07 DIAGNOSIS — E11.22 TYPE 2 DIABETES MELLITUS WITH STAGE 3A CHRONIC KIDNEY DISEASE, WITHOUT LONG-TERM CURRENT USE OF INSULIN (H): ICD-10-CM

## 2024-08-07 DIAGNOSIS — E78.5 HYPERLIPIDEMIA LDL GOAL <100: ICD-10-CM

## 2024-08-07 DIAGNOSIS — Z29.11 NEED FOR VACCINATION AGAINST RESPIRATORY SYNCYTIAL VIRUS: ICD-10-CM

## 2024-08-07 DIAGNOSIS — E11.42 DIABETIC POLYNEUROPATHY ASSOCIATED WITH TYPE 2 DIABETES MELLITUS (H): ICD-10-CM

## 2024-08-07 DIAGNOSIS — Z00.00 ENCOUNTER FOR MEDICARE ANNUAL WELLNESS EXAM: Primary | ICD-10-CM

## 2024-08-07 DIAGNOSIS — N18.31 TYPE 2 DIABETES MELLITUS WITH STAGE 3A CHRONIC KIDNEY DISEASE, WITHOUT LONG-TERM CURRENT USE OF INSULIN (H): ICD-10-CM

## 2024-08-07 DIAGNOSIS — I71.40 ABDOMINAL AORTIC ANEURYSM (AAA) WITHOUT RUPTURE, UNSPECIFIED PART (H): ICD-10-CM

## 2024-08-07 DIAGNOSIS — I10 HYPERTENSION GOAL BP (BLOOD PRESSURE) < 140/90: ICD-10-CM

## 2024-08-07 DIAGNOSIS — E87.1 HYPONATREMIA: ICD-10-CM

## 2024-08-07 LAB
ANION GAP SERPL CALCULATED.3IONS-SCNC: 11 MMOL/L (ref 7–15)
BUN SERPL-MCNC: 24 MG/DL (ref 8–23)
CALCIUM SERPL-MCNC: 10.4 MG/DL (ref 8.8–10.4)
CHLORIDE SERPL-SCNC: 95 MMOL/L (ref 98–107)
CREAT SERPL-MCNC: 1.59 MG/DL (ref 0.67–1.17)
EGFRCR SERPLBLD CKD-EPI 2021: 42 ML/MIN/1.73M2
GLUCOSE SERPL-MCNC: 137 MG/DL (ref 70–99)
HCO3 SERPL-SCNC: 26 MMOL/L (ref 22–29)
POTASSIUM SERPL-SCNC: 3.7 MMOL/L (ref 3.4–5.3)
SODIUM SERPL-SCNC: 132 MMOL/L (ref 135–145)

## 2024-08-07 PROCEDURE — 36415 COLL VENOUS BLD VENIPUNCTURE: CPT | Performed by: FAMILY MEDICINE

## 2024-08-07 PROCEDURE — G0439 PPPS, SUBSEQ VISIT: HCPCS | Performed by: FAMILY MEDICINE

## 2024-08-07 PROCEDURE — 80048 BASIC METABOLIC PNL TOTAL CA: CPT | Performed by: FAMILY MEDICINE

## 2024-08-07 PROCEDURE — 99214 OFFICE O/P EST MOD 30 MIN: CPT | Mod: 25 | Performed by: FAMILY MEDICINE

## 2024-08-07 RX ORDER — LISINOPRIL 40 MG/1
40 TABLET ORAL DAILY
Qty: 90 TABLET | Refills: 3 | Status: SHIPPED | OUTPATIENT
Start: 2024-08-07

## 2024-08-07 RX ORDER — PRAVASTATIN SODIUM 80 MG/1
80 TABLET ORAL DAILY
Qty: 90 TABLET | Refills: 3 | Status: SHIPPED | OUTPATIENT
Start: 2024-08-07

## 2024-08-07 NOTE — RESULT ENCOUNTER NOTE
Mann,    Your sodium is still mildly low, a possible side effect to your chlorthalidone medication.     Your kidney test is a bit abnormal again. I recommend follow-up in 3 months. Drink plenty of water the day of your visit so we can recheck this.     Francisca Bautista MD

## 2024-08-07 NOTE — PROGRESS NOTES
"Preventive Care Visit  [facility] VINEET Bautista MD, Family Medicine  Aug 7, 2024      Assessment & Plan     Encounter for Medicare annual wellness exam    Type 2 diabetes mellitus with stage 3a chronic kidney disease, without long-term current use of insulin (H)  Diabetic polyneuropathy associated with type 2 diabetes mellitus (H)  Well controlled with medications without side effects.   - Adult Eye  Referral; Future  - OFFICE/OUTPT VISIT,EST,LEVL IV  - Basic metabolic panel  (Ca, Cl, CO2, Creat, Gluc, K, Na, BUN); Future    Hypertension goal BP (blood pressure) < 140/90  Uncontrolled on multiple medications; recommended exercise and healthy eating; follow-up recommended to consider adding 4th agent   - OFFICE/OUTPT VISIT,EST,LEVL IV  - Basic metabolic panel  (Ca, Cl, CO2, Creat, Gluc, K, Na, BUN); Future    Abdominal aortic aneurysm (AAA) without rupture, unspecified part -  HYPERLIPIDEMIA LDL GOAL <40  Well controlled with medications without side effects.   - pravastatin (PRAVACHOL) 80 MG tablet; Take 1 tablet (80 mg) by mouth daily  - OFFICE/OUTPT VISIT,EST,LEVL IV      BMI  Estimated body mass index is 33.63 kg/m  as calculated from the following:    Height as of this encounter: 1.695 m (5' 6.73\").    Weight as of this encounter: 96.6 kg (213 lb).   Weight management plan: Discussed healthy diet and exercise guidelines    Counseling  Appropriate preventive services were addressed with this patient via screening, questionnaire, or discussion as appropriate for fall prevention, nutrition, physical activity, Tobacco-use cessation, weight loss and cognition.  Checklist reviewing preventive services available has been given to the patient.  Reviewed patient's diet, addressing concerns and/or questions.   He is at risk for lack of exercise and has been provided with information to increase physical activity for the benefit of his well-being.   The patient was instructed to see the " dentist every 6 months.   Patient reported safety concerns were addressed today.        Subjective   Mann is a 84 year old, presenting for the following:  Physical        8/7/2024     8:27 AM   Additional Questions   Roomed by Jasmin HUMPHREY CMA   Accompanied by Self         Health Care Directive  Patient has a Health Care Directive on file  Advance care planning document is on file and is current.    HPI  Patient also presents to follow-up diabetes. Diabetic Review of Systems - Medication compliance:  compliant all of the time, Diabetic diet compliance:  compliant most of the time, Home glucose monitoring:  blood glucose record WAS NOT brought in today, Diabetic ROS: no polyuria or polydipsia, no chest pain, dyspnea or TIA's, no numbness, tingling or pain in extremities, no unusual visual symptoms, no hypoglycemia, no medication side effects noted.      Hypertension well controlled on current medications without side effects, chest pain, or dyspnea. Hypercholesterolemia well controlled with current treatment plan without side effects.     He sees orthopedic surgery for knee degenerative joint disease.             8/6/2024   General Health   How would you rate your overall physical health? (!) FAIR   Feel stress (tense, anxious, or unable to sleep) Not at all            8/6/2024   Nutrition   Diet: Diabetic            8/6/2024   Exercise   Days per week of moderate/strenous exercise 2 days   Average minutes spent exercising at this level 10 min      (!) EXERCISE CONCERN      8/6/2024   Social Factors   Frequency of gathering with friends or relatives Twice a week   Worry food won't last until get money to buy more No   Food not last or not have enough money for food? No   Do you have housing? (Housing is defined as stable permanent housing and does not include staying ouside in a car, in a tent, in an abandoned building, in an overnight shelter, or couch-surfing.) Yes   Are you worried about losing your housing? No   Lack  of transportation? No   Unable to get utilities (heat,electricity)? No            2024   Fall Risk   Gait Speed Test (Document in seconds) 4.63   Gait Speed Test Interpretation Less than or equal to 5.00 seconds - PASS             2024   Activities of Daily Living- Home Safety   Needs help with the following daily activites None of the above   Safety concerns in the home No grab bars in the bathroom            2024   Dental   Dentist two times every year? (!) NO            2024   Hearing Screening   Hearing concerns? None of the above            2024   Driving Risk Screening   Patient/family members have concerns about driving No            2024   General Alertness/Fatigue Screening   Have you been more tired than usual lately? No            2024   Urinary Incontinence Screening   Bothered by leaking urine in past 6 months No            2024   TB Screening   Were you born outside of the [place]? No            Today's PHQ-2 Score:       2024     9:52 AM   PHQ-2 (  [employer])   Q1: Little interest or pleasure in doing things 0   Q2: Feeling down, depressed or hopeless 0   PHQ-2 Score 0   Q1: Little interest or pleasure in doing things Not at all   Q2: Feeling down, depressed or hopeless Not at all   PHQ-2 Score 0           2024   Substance Use   Alcohol more than 3/day or more than 7/wk No   Do you have a current opioid prescription? No   How severe/bad is pain from 1 to 10? 2/10   Do you use any other substances recreationally? No        Social History     Tobacco Use    Smoking status: Former     Current packs/day: 0.00     Average packs/day: 0.5 packs/day for 10.0 years (5.0 ttl pk-yrs)     Types: Cigars, Cigarettes     Start date: 1975     Quit date: 1985     Years since quittin.5    Smokeless tobacco: Never   Vaping Use    Vaping status: Never Used   Substance Use Topics    Alcohol use: Yes     Alcohol/week: 5.8 standard drinks of alcohol     Comment: occasional      Drug use: No                 Reviewed and updated as needed this visit by Provider   Tobacco  Allergies  Meds  Problems  Med Hx  Surg Hx  Fam Hx     Sexual Activity          Patient Active Problem List   Diagnosis    HYPERLIPIDEMIA LDL GOAL <40    Dermatochalasis OU    NORMA (obstructive sleep apnea)    Cataract OU    Eczema    Type 2 diabetes mellitus with stage 3a chronic kidney disease, without long-term current use of insulin (H)    Chronic gout without tophus, unspecified cause, unspecified site    Abdominal aortic aneurysm without rupture (H24)    Hypertension goal BP (blood pressure) < 140/90    Obesity    History of basal cell cancer    AV block    Diabetic polyneuropathy associated with type 2 diabetes mellitus (H)    Hyponatremia    Primary osteoarthritis of left knee    Sensorineural hearing loss, bilateral     Past Surgical History:   Procedure Laterality Date    BIOPSY      BLEPHAROPLASTY BILATERAL Bilateral 2022    Procedure: Blepharoplasty bilateral;  Surgeon: Wilson Scruggs MD;  Location:  OR    COLONOSCOPY      LUMBAR DISC SURGERY      OPTICAL TRACKING SYSTEM FUSION POSTERIOR SPINE LUMBAR  2013    Procedure: OPTICAL TRACKING SYSTEM FUSION SPINE POSTERIOR LUMBAR ONE LEVEL;  Lumbar 4-5 Laminectomy, Decompression; Lumbar 4-5 Transforaminal Lumbar Interbody Fusion ;  Surgeon: Saurav Valdez MD;  Location: [facility] OR    REPAIR PTOSIS BILATERAL Bilateral 2022    Procedure: both upper eyelid and ptosis repair;  Surgeon: Wilson Scruggs MD;  Location:  OR       Social History     Tobacco Use    Smoking status: Former     Current packs/day: 0.00     Average packs/day: 0.5 packs/day for 10.0 years (5.0 ttl pk-yrs)     Types: Cigars, Cigarettes     Start date: 1975     Quit date: 1985     Years since quittin.5    Smokeless tobacco: Never   Substance Use Topics    Alcohol use: Yes     Alcohol/week: 5.8 standard drinks of alcohol     Comment:  "occasional      Family History   Problem Relation Age of Onset    Heart Disease Father     Heart Disease Brother     Diabetes Brother     Cancer Paternal Grandfather         leukemia          Current providers sharing in care for this patient include:  Patient Care Team:  Francisca Bautista MD as PCP - General (Family Medicine)  Francisca Bautista MD as Assigned PCP  Yash Valverde MD as Assigned Musculoskeletal Provider  Mindi Espinoza AuD as Audiologist (Audiology)    The following health maintenance items are reviewed in Epic and correct as of today:  Health Maintenance   Topic Date Due    RSV VACCINE (Pregnancy & 60+) (1 - 1-dose 60+ series) Never done    EYE EXAM  12/01/2023    INFLUENZA VACCINE (1) 09/01/2024    A1C  11/22/2024    LIPID  12/11/2024    MICROALBUMIN  12/11/2024    DIABETIC FOOT EXAM  12/11/2024    BMP  05/22/2025    URIC ACID  05/22/2025    HEMOGLOBIN  05/22/2025    MEDICARE ANNUAL WELLNESS VISIT  08/07/2025    ANNUAL REVIEW OF HM ORDERS  08/07/2025    FALL RISK ASSESSMENT  08/07/2025    ADVANCE CARE PLANNING  08/07/2029    DTAP/TDAP/TD IMMUNIZATION (3 - Td or Tdap) 05/23/2033    PHQ-2 (once per calendar year)  Completed    Pneumococcal Vaccine: 65+ Years  Completed    URINALYSIS  Completed    ZOSTER IMMUNIZATION  Completed    COVID-19 Vaccine  Completed    IPV IMMUNIZATION  Aged Out    HPV IMMUNIZATION  Aged Out    MENINGITIS IMMUNIZATION  Aged Out    RSV MONOCLONAL ANTIBODY  Aged Out    COLORECTAL CANCER SCREENING  Discontinued            Objective    Exam  BP (!) 154/74 (BP Location: Left arm, Patient Position: Sitting, Cuff Size: Adult Large)   Pulse 65   Temp 97.7  F (36.5  C) (Oral)   Resp 16   Ht 1.695 m (5' 6.73\")   Wt 96.6 kg (213 lb)   SpO2 98%   BMI 33.63 kg/m     Estimated body mass index is 33.63 kg/m  as calculated from the following:    Height as of this encounter: 1.695 m (5' 6.73\").    Weight as of this encounter: 96.6 kg (213 lb).    Physical " Exam  GENERAL: alert and no distress  EYES: Eyes grossly normal to inspection, PERRL and conjunctivae and sclerae normal  HENT: ear canals and TM's normal, nose and mouth without ulcers or lesions  NECK: no adenopathy, no asymmetry, masses, or scars  RESP: lungs clear to auscultation - no rales, rhonchi or wheezes  CV: regular rate and rhythm, normal S1 S2, no S3 or S4, no murmur, click or rub, no peripheral edema  MS: no gross musculoskeletal defects noted, no edema  NEURO: Normal strength and tone, mentation intact and speech normal  PSYCH: mentation appears normal, affect normal/bright        8/7/2024   Mini Cog   Clock Draw Score 2 Normal   3 Item Recall 3 objects recalled   Mini Cog Total Score 5                 Signed Electronically by: Francisca Bautista MD

## 2024-08-07 NOTE — PATIENT INSTRUCTIONS
Did you know you can lower your blood pressure with your daily habits?    *Losing 20 pounds of weight lowers blood pressure 5 to 20 points.  *Eating a low-fat diet rich in fruits, vegetables and low-fat dairy lowers blood pressure 8 to 14 points.  *Eating a low-salt diet lowers blood pressure 2 to 8 points.  *Exercising regularly lowers blood pressure 4 to 9 points.  *Reducing alcohol use lowers blood pressure 2 to 4 points.     Patient Education   Preventive Care Advice   This is general advice given by our system to help you stay healthy. However, your care team may have specific advice just for you. Please talk to your care team about your preventive care needs.  Nutrition  Eat 5 or more servings of fruits and vegetables each day.  Try wheat bread, brown rice and whole grain pasta (instead of white bread, rice, and pasta).  Get enough calcium and vitamin D. Check the label on foods and aim for 100% of the RDA (recommended daily allowance).  Lifestyle  Exercise at least 150 minutes each week  (30 minutes a day, 5 days a week).  Do muscle strengthening activities 2 days a week. These help control your weight and prevent disease.  No smoking.  Wear sunscreen to prevent skin cancer.  Have a dental exam and cleaning every 6 months.  Yearly exams  See your health care team every year to talk about:  Any changes in your health.  Any medicines your care team has prescribed.  Preventive care, family planning, and ways to prevent chronic diseases.  Shots (vaccines)   HPV shots (up to age 26), if you've never had them before.  Hepatitis B shots (up to age 59), if you've never had them before.  COVID-19 shot: Get this shot when it's due.  Flu shot: Get a flu shot every year.  Tetanus shot: Get a tetanus shot every 10 years.  Pneumococcal, hepatitis A, and RSV shots: Ask your care team if you need these based on your risk.  Shingles shot (for age 50 and up)  General health tests  Diabetes screening:  Starting at age 35, Get  screened for diabetes at least every 3 years.  If you are younger than age 35, ask your care team if you should be screened for diabetes.  Cholesterol test: At age 39, start having a cholesterol test every 5 years, or more often if advised.  Bone density scan (DEXA): At age 50, ask your care team if you should have this scan for osteoporosis (brittle bones).  Hepatitis C: Get tested at least once in your life.  STIs (sexually transmitted infections)  Before age 24: Ask your care team if you should be screened for STIs.  After age 24: Get screened for STIs if you're at risk. You are at risk for STIs (including HIV) if:  You are sexually active with more than one person.  You don't use condoms every time.  You or a partner was diagnosed with a sexually transmitted infection.  If you are at risk for HIV, ask about PrEP medicine to prevent HIV.  Get tested for HIV at least once in your life, whether you are at risk for HIV or not.  Cancer screening tests  Cervical cancer screening: If you have a cervix, begin getting regular cervical cancer screening tests starting at age 21.  Breast cancer scan (mammogram): If you've ever had breasts, begin having regular mammograms starting at age 40. This is a scan to check for breast cancer.  Colon cancer screening: It is important to start screening for colon cancer at age 45.  Have a colonoscopy test every 10 years (or more often if you're at risk) Or, ask your provider about stool tests like a FIT test every year or Cologuard test every 3 years.  To learn more about your testing options, visit:   .  For help making a decision, visit:   https://bit.ly/db75438.  Prostate cancer screening test: If you have a prostate, ask your care team if a prostate cancer screening test (PSA) at age 55 is right for you.  Lung cancer screening: If you are a current or former smoker ages 50 to 80, ask your care team if ongoing lung cancer screenings are right for you.  For informational purposes  only. Not to replace the advice of your health care provider. Copyright   2023 [facility]. All rights reserved. Clinically reviewed by the [facility]itions [facility]. [employer] 209461 - REV 01/24.  Learning About Activities of Daily Living  What are activities of daily living?     Activities of daily living (ADLs) are the basic self-care tasks you do every day. These include eating, bathing, dressing, and moving around.  As you age, and if you have health problems, you may find that it's harder to do some of these tasks. If so, your doctor can suggest ideas that may help.  To measure what kind of help you may need, your doctor will ask how well you are able to do ADLs. Let your doctor know if there are any tasks that you are having trouble doing. This is an important first step to getting help. And when you have the help you need, you can stay as independent as possible.  How will a doctor assess your ADLs?  Asking about ADLs is part of a routine health checkup your doctor will likely do as you age. Your health check might be done in a doctor's office, in your home, or at a hospital. The goal is to find out if you are having any problems that could make it hard to care for yourself or that make it unsafe for you to be on your own.  To measure your ADLs, your doctor will ask how hard it is for you to do routine tasks. Your doctor may also want to know if you have changed the way you do a task because of a health problem. Your doctor may watch how you:  Walk back and forth.  Keep your balance while you stand or walk.  Move from sitting to standing or from a bed to a chair.  Button or unbutton a shirt or sweater.  Remove and put on your shoes.  It's common to feel a little worried or anxious if you find you can't do all the things you used to be able to do. Talking with your doctor about ADLs is a way to make sure you're as safe as possible and able to care for yourself as well as you can. You  may want to bring a caregiver, friend, or family member to your checkup. They can help you talk to your doctor.  Follow-up care is a key part of your treatment and safety. Be sure to make and go to all appointments, and call your doctor if you are having problems. It's also a good idea to know your test results and keep a list of the medicines you take.  Current as of: October 24, 2023  Content Version: 14.1    1859-8562 [employer].   Care instructions adapted under license by your healthcare professional. If you have questions about a medical condition or this instruction, always ask your healthcare professional. [employer] disclaims any warranty or liability for your use of this information.    Preventing Falls: Care Instructions  Injuries and health problems such as trouble walking or poor eyesight can increase your risk of falling. So can some medicines. But there are things you can do to help prevent falls. You can exercise to get stronger. You can also arrange your home to make it safer.    Talk to your doctor about the medicines you take. Ask if any of them increase the risk of falls and whether they can be changed or stopped.   Try to exercise regularly. It can help improve your strength and balance. This can help lower your risk of falling.     Practice fall safety and prevention.    Wear low-heeled shoes that fit well and give your feet good support. Talk to your doctor if you have foot problems that make this hard.  Carry a cellphone or wear a medical alert device that you can use to call for help.  Use stepladders instead of chairs to reach high objects. Don't climb if you're at risk for falls. Ask for help, if needed.  Wear the correct eyeglasses, if you need them.    Make your home safer.    Remove rugs, cords, clutter, and furniture from walkways.  Keep your house well lit. Use night-lights in hallways and bathrooms.  Install and use sturdy handrails on stairways.  Wear  "nonskid footwear, even inside. Don't walk barefoot or in socks without shoes.    Be safe outside.    Use handrails, curb cuts, and ramps whenever possible.  Keep your hands free by using a shoulder bag or backpack.  Try to walk in well-lit areas. Watch out for uneven ground, changes in pavement, and debris.  Be careful in the winter. Walk on the grass or gravel when sidewalks are slippery. Use de-icer on steps and walkways. Add non-slip devices to shoes.    Put grab bars and nonskid mats in your shower or tub and near the toilet. Try to use a shower chair or bath bench when bathing.   Get into a tub or shower by putting in your weaker leg first. Get out with your strong side first. Have a phone or medical alert device in the bathroom with you.   Where can you learn more?  Go to https://www.[employer].net/patiented  Enter G117 in the search box to learn more about \"Preventing Falls: Care Instructions.\"  Current as of: July 17, 2023               Content Version: 14.0    9749-4622 [employer].   Care instructions adapted under license by your healthcare professional. If you have questions about a medical condition or this instruction, always ask your healthcare professional. [employer] disclaims any warranty or liability for your use of this information.         "

## 2024-09-19 ENCOUNTER — OFFICE VISIT (OUTPATIENT)
Dept: OPHTHALMOLOGY | Facility: CLINIC | Age: 84
End: 2024-09-19
Payer: COMMERCIAL

## 2024-09-19 DIAGNOSIS — H52.203 MYOPIA OF BOTH EYES WITH ASTIGMATISM AND PRESBYOPIA: ICD-10-CM

## 2024-09-19 DIAGNOSIS — E11.22 TYPE 2 DIABETES MELLITUS WITH STAGE 3A CHRONIC KIDNEY DISEASE, WITHOUT LONG-TERM CURRENT USE OF INSULIN (H): Primary | ICD-10-CM

## 2024-09-19 DIAGNOSIS — N18.31 TYPE 2 DIABETES MELLITUS WITH STAGE 3A CHRONIC KIDNEY DISEASE, WITHOUT LONG-TERM CURRENT USE OF INSULIN (H): Primary | ICD-10-CM

## 2024-09-19 DIAGNOSIS — H52.13 MYOPIA OF BOTH EYES WITH ASTIGMATISM AND PRESBYOPIA: ICD-10-CM

## 2024-09-19 DIAGNOSIS — Z01.01 ENCOUNTER FOR EXAMINATION OF EYES AND VISION WITH ABNORMAL FINDINGS: ICD-10-CM

## 2024-09-19 DIAGNOSIS — H25.813 COMBINED FORMS OF AGE-RELATED CATARACT OF BOTH EYES: ICD-10-CM

## 2024-09-19 DIAGNOSIS — H52.4 MYOPIA OF BOTH EYES WITH ASTIGMATISM AND PRESBYOPIA: ICD-10-CM

## 2024-09-19 DIAGNOSIS — Z98.890 S/P BILATERAL BLEPHAROPLASTY: ICD-10-CM

## 2024-09-19 PROCEDURE — 92015 DETERMINE REFRACTIVE STATE: CPT | Performed by: STUDENT IN AN ORGANIZED HEALTH CARE EDUCATION/TRAINING PROGRAM

## 2024-09-19 PROCEDURE — 92014 COMPRE OPH EXAM EST PT 1/>: CPT | Performed by: STUDENT IN AN ORGANIZED HEALTH CARE EDUCATION/TRAINING PROGRAM

## 2024-09-19 ASSESSMENT — REFRACTION_MANIFEST
OS_CYLINDER: +0.75
OS_SPHERE: -0.50
OD_SPHERE: -0.50
OD_AXIS: 176
OD_ADD: +2.75
OD_CYLINDER: +1.00
OS_ADD: +2.75
OS_AXIS: 003

## 2024-09-19 ASSESSMENT — EXTERNAL EXAM - LEFT EYE: OS_EXAM: NORMAL

## 2024-09-19 ASSESSMENT — VISUAL ACUITY
OD_SC: 20/40
OS_SC+: -1
OD_SC+: -1
METHOD: SNELLEN - LINEAR
OS_SC: 20/40
OD_PH_SC: 20/25
OS_PH_SC: 20/30

## 2024-09-19 ASSESSMENT — CONF VISUAL FIELD
OS_SUPERIOR_NASAL_RESTRICTION: 0
OD_NORMAL: 1
OS_SUPERIOR_TEMPORAL_RESTRICTION: 0
OD_INFERIOR_NASAL_RESTRICTION: 0
OS_INFERIOR_NASAL_RESTRICTION: 0
OS_INFERIOR_TEMPORAL_RESTRICTION: 0
OD_INFERIOR_TEMPORAL_RESTRICTION: 0
OS_NORMAL: 1
OD_SUPERIOR_NASAL_RESTRICTION: 0
OD_SUPERIOR_TEMPORAL_RESTRICTION: 0

## 2024-09-19 ASSESSMENT — TONOMETRY
OS_IOP_MMHG: 17
IOP_METHOD: APPLANATION
OD_IOP_MMHG: 21

## 2024-09-19 ASSESSMENT — CUP TO DISC RATIO
OS_RATIO: 0.3
OD_RATIO: 0.3

## 2024-09-19 ASSESSMENT — EXTERNAL EXAM - RIGHT EYE: OD_EXAM: NORMAL

## 2024-09-19 NOTE — LETTER
"9/19/2024      Zoey Romo  [address] [facility] [address]      Dear Colleague,    Thank you for referring your patient, Zoey Romo, to the [facility]. Please see a copy of my visit note below.     Current Eye Medications:  systane both eyes as needed.       Subjective:  Patient is here for a Diabetic Eye Exam.  Distance vision is good without correction.  He wears +2.00 over-the-counter readers, as needed, which work adequately (\"I don't do much reading\")  Lab Results   Component Value Date    A1C 6.6 05/22/2024    A1C 6.2 12/11/2023    A1C 6.3 05/16/2023    A1C 6.0 10/31/2022    A1C 6.4 03/11/2022    A1C 6.6 03/03/2021    A1C 5.9 09/17/2020    A1C 6.3 10/30/2019    A1C 6.3 04/26/2019    A1C 6.4 04/18/2018     Last visit with Dr. Cowan 2/2022. Saw Dr. Scruggs also in 2022 for blepharoplasty/ptosis repair.      Objective:  See Ophthalmology Exam.       Assessment:  Zoey Romo is a 84 year old male who presents with:   Encounter Diagnoses   Name Primary?     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 Avoid generic artificial tears or \"get the red out\" drops).     Continue over the counter readers or can fill glasses prescription given     Keep blood sugars and blood pressure under good control.    Johnny Cowan MD  [phone]    Patient Education  Diabetes weakens the blood vessels all over the body, including the eyes. Damage to the blood vessels in the eyes can cause swelling or bleeding into part of the eye " (called the retina). This is called diabetic retinopathy (LEANNE-tin-AH-puh-thee). If not treated, this disease can cause vision loss or blindness.   Symptoms may include blurred or distorted vision, but many people have no symptoms. It's important to see your eye doctor regularly to check for problems.   Early treatment and good control can help protect your vision. Here are the things you can do to help prevent vision loss:      1. Keep your blood sugar levels under tight control.      2. Bring high blood pressure under control.      3. No smoking.      4. Have yearly dilated eye exams.        Again, thank you for allowing me to participate in the care of your patient.        Sincerely,        Johnny Cowan MD

## 2024-09-19 NOTE — PROGRESS NOTES
" Current Eye Medications:  systane both eyes as needed.       Subjective:  Patient is here for a Diabetic Eye Exam.  Distance vision is good without correction.  He wears +2.00 over-the-counter readers, as needed, which work adequately (\"I don't do much reading\")  Lab Results   Component Value Date    A1C 6.6 05/22/2024    A1C 6.2 12/11/2023    A1C 6.3 05/16/2023    A1C 6.0 10/31/2022    A1C 6.4 03/11/2022    A1C 6.6 03/03/2021    A1C 5.9 09/17/2020    A1C 6.3 10/30/2019    A1C 6.3 04/26/2019    A1C 6.4 04/18/2018     Last visit with Dr. Cowan 2/2022. Saw Dr. Scruggs also in 2022 for blepharoplasty/ptosis repair.      Objective:  See Ophthalmology Exam.       Assessment:  Zoey Romo is a 84 year old male who presents with:   Encounter Diagnoses   Name Primary?    Type 2 diabetes mellitus with stage 3a chronic kidney disease, without long-term current use of insulin (H) Negative diabetic retinopathy     Encounter for examination of eyes and vision with abnormal findings     Combined forms of age-related cataract of both eyes     S/p bilateral blepharoplasty w/AM 2022     Myopia of both eyes with astigmatism and presbyopia        Plan:  Continue artificial tears up to four times a day as needed (Refresh Optive, Systane Balance, or TheraTears. Avoid generic artificial tears or \"get the red out\" drops).     Continue over the counter readers or can fill glasses prescription given     Keep blood sugars and blood pressure under good control.    Johnny Cowan MD  [phone]    Patient Education  Diabetes weakens the blood vessels all over the body, including the eyes. Damage to the blood vessels in the eyes can cause swelling or bleeding into part of the eye (called the retina). This is called diabetic retinopathy (LEANNE-tin--puh-thee). If not treated, this disease can cause vision loss or blindness.   Symptoms may include blurred or distorted vision, but many people have no symptoms. It's important to " see your eye doctor regularly to check for problems.   Early treatment and good control can help protect your vision. Here are the things you can do to help prevent vision loss:      1. Keep your blood sugar levels under tight control.      2. Bring high blood pressure under control.      3. No smoking.      4. Have yearly dilated eye exams.

## 2024-09-19 NOTE — PATIENT INSTRUCTIONS
"Continue artificial tears up to four times a day as needed (Refresh Optive, Systane Balance, or TheraTears. Avoid generic artificial tears or \"get the red out\" drops).     Continue over the counter readers or can fill glasses prescription given     Keep blood sugars and blood pressure under good control.    Johnny Cowan MD  [phone]    Patient Education   Diabetes weakens the blood vessels all over the body, including the eyes. Damage to the blood vessels in the eyes can cause swelling or bleeding into part of the eye (called the retina). This is called diabetic retinopathy (LEANNE-tin-AH-puh-thee). If not treated, this disease can cause vision loss or blindness.   Symptoms may include blurred or distorted vision, but many people have no symptoms. It's important to see your eye doctor regularly to check for problems.   Early treatment and good control can help protect your vision. Here are the things you can do to help prevent vision loss:      1. Keep your blood sugar levels under tight control.      2. Bring high blood pressure under control.      3. No smoking.      4. Have yearly dilated eye exams.      "

## 2024-10-15 ENCOUNTER — TELEPHONE (OUTPATIENT)
Dept: FAMILY MEDICINE | Facility: CLINIC | Age: 84
End: 2024-10-15
Payer: COMMERCIAL

## 2024-10-15 NOTE — TELEPHONE ENCOUNTER
Patient Quality Outreach    Patient is due for the following:   Hypertension -  BP check      Topic Date Due    Flu Vaccine (1) 09/01/2024    COVID-19 Vaccine (9 - 2024-25 season) 09/01/2024       Next Steps:   Patient has upcoming appointment, these items will be addressed at that time. 11/14/24 Wellness visit with Dr. Armijo.    Type of outreach:    Chart review performed, no outreach needed.    Next Steps:  Reach out within 90 days via [employer].    Max number of attempts reached: Yes. Will try again in 90 days if patient still on fail list.    Questions for provider review:    None           Dolly Bergeron, [facility]  Chart routed to Care Team.

## 2024-10-31 DIAGNOSIS — E11.22 TYPE 2 DIABETES MELLITUS WITH STAGE 3A CHRONIC KIDNEY DISEASE, WITHOUT LONG-TERM CURRENT USE OF INSULIN (H): ICD-10-CM

## 2024-10-31 DIAGNOSIS — N18.31 TYPE 2 DIABETES MELLITUS WITH STAGE 3A CHRONIC KIDNEY DISEASE, WITHOUT LONG-TERM CURRENT USE OF INSULIN (H): ICD-10-CM

## 2024-11-05 ENCOUNTER — TELEPHONE (OUTPATIENT)
Dept: FAMILY MEDICINE | Facility: CLINIC | Age: 84
End: 2024-11-05
Payer: COMMERCIAL

## 2024-11-05 DIAGNOSIS — E11.22 TYPE 2 DIABETES MELLITUS WITH STAGE 3A CHRONIC KIDNEY DISEASE, WITHOUT LONG-TERM CURRENT USE OF INSULIN (H): ICD-10-CM

## 2024-11-05 DIAGNOSIS — N18.31 TYPE 2 DIABETES MELLITUS WITH STAGE 3A CHRONIC KIDNEY DISEASE, WITHOUT LONG-TERM CURRENT USE OF INSULIN (H): ICD-10-CM

## 2024-11-05 NOTE — TELEPHONE ENCOUNTER
Medication Question or Refill    Contacts       Contact Date/Time Type Contact Phone/Fax    11/05/2024 12:27 PM CST Phone (Incoming) Mackenzie Romo (Self) [phone] (H)            What medication are you calling about (include dose and sig)?: metformin 500mg    Preferred Pharmacy:   [facility]  [id number]  [address] [address]  [facility] [address]  Phone: [phone] Fax: [phone]    [id number] - [place], [place] - [address] OF [address] [place] [address] AVE  [facility] [address]  Phone: [phone] Fax: [phone]    [id number] - [place], [place] - [address]  [address]  [facility] [place] [address]  Phone: [phone] Fax: [phone]      Controlled Substance Agreement on file:   CSA -- Patient Level:    CSA: None found at the patient level.       Who prescribed the medication?: pcp    Do you need a refill? Yes    When did you use the medication last? 11/4/24    Patient offered an appointment? No    Do you have any questions or concerns?  Yes, almost out. Needs rx to be renewed and sent to [facility] above      Could we send this information to you in [facility] or would you prefer to receive a phone call?:   Patient would prefer a phone call   Okay to leave a detailed message?: Yes at Home number on file [phone] (home)

## 2024-11-13 SDOH — HEALTH STABILITY: PHYSICAL HEALTH: ON AVERAGE, HOW MANY MINUTES DO YOU ENGAGE IN EXERCISE AT THIS LEVEL?: 10 MIN

## 2024-11-13 SDOH — HEALTH STABILITY: PHYSICAL HEALTH: ON AVERAGE, HOW MANY DAYS PER WEEK DO YOU ENGAGE IN MODERATE TO STRENUOUS EXERCISE (LIKE A BRISK WALK)?: 1 DAY

## 2024-11-13 ASSESSMENT — SOCIAL DETERMINANTS OF HEALTH (SDOH): HOW OFTEN DO YOU GET TOGETHER WITH FRIENDS OR RELATIVES?: THREE TIMES A WEEK

## 2024-11-14 ENCOUNTER — OFFICE VISIT (OUTPATIENT)
Dept: FAMILY MEDICINE | Facility: CLINIC | Age: 84
End: 2024-11-14
Payer: COMMERCIAL

## 2024-11-14 VITALS
BODY MASS INDEX: 34.3 KG/M2 | RESPIRATION RATE: 16 BRPM | DIASTOLIC BLOOD PRESSURE: 88 MMHG | TEMPERATURE: 97.4 F | WEIGHT: 213.4 LBS | HEIGHT: 66 IN | SYSTOLIC BLOOD PRESSURE: 151 MMHG | OXYGEN SATURATION: 98 % | HEART RATE: 73 BPM

## 2024-11-14 DIAGNOSIS — Z13.220 ENCOUNTER FOR LIPID SCREENING FOR CARDIOVASCULAR DISEASE: ICD-10-CM

## 2024-11-14 DIAGNOSIS — E11.22 TYPE 2 DIABETES MELLITUS WITH STAGE 3A CHRONIC KIDNEY DISEASE, WITHOUT LONG-TERM CURRENT USE OF INSULIN (H): ICD-10-CM

## 2024-11-14 DIAGNOSIS — Z00.00 WELL ADULT EXAM: Primary | ICD-10-CM

## 2024-11-14 DIAGNOSIS — E11.42 DIABETIC POLYNEUROPATHY ASSOCIATED WITH TYPE 2 DIABETES MELLITUS (H): ICD-10-CM

## 2024-11-14 DIAGNOSIS — N18.31 TYPE 2 DIABETES MELLITUS WITH STAGE 3A CHRONIC KIDNEY DISEASE, WITHOUT LONG-TERM CURRENT USE OF INSULIN (H): ICD-10-CM

## 2024-11-14 DIAGNOSIS — Z29.11 NEED FOR VACCINATION AGAINST RESPIRATORY SYNCYTIAL VIRUS: ICD-10-CM

## 2024-11-14 DIAGNOSIS — Z13.6 ENCOUNTER FOR LIPID SCREENING FOR CARDIOVASCULAR DISEASE: ICD-10-CM

## 2024-11-14 DIAGNOSIS — I10 ESSENTIAL HYPERTENSION: ICD-10-CM

## 2024-11-14 DIAGNOSIS — M1A.9XX0 CHRONIC GOUT WITHOUT TOPHUS, UNSPECIFIED CAUSE, UNSPECIFIED SITE: ICD-10-CM

## 2024-11-14 LAB
ALBUMIN SERPL BCG-MCNC: 4.3 G/DL (ref 3.5–5.2)
ALP SERPL-CCNC: 29 U/L (ref 40–150)
ALT SERPL W P-5'-P-CCNC: 17 U/L (ref 0–70)
ANION GAP SERPL CALCULATED.3IONS-SCNC: 10 MMOL/L (ref 7–15)
AST SERPL W P-5'-P-CCNC: 22 U/L (ref 0–45)
BILIRUB SERPL-MCNC: 0.6 MG/DL
BUN SERPL-MCNC: 32 MG/DL (ref 8–23)
CALCIUM SERPL-MCNC: 10.7 MG/DL (ref 8.8–10.4)
CHLORIDE SERPL-SCNC: 98 MMOL/L (ref 98–107)
CHOLEST SERPL-MCNC: 174 MG/DL
CREAT SERPL-MCNC: 1.64 MG/DL (ref 0.67–1.17)
EGFRCR SERPLBLD CKD-EPI 2021: 41 ML/MIN/1.73M2
ERYTHROCYTE [DISTWIDTH] IN BLOOD BY AUTOMATED COUNT: 12.5 % (ref 10–15)
EST. AVERAGE GLUCOSE BLD GHB EST-MCNC: 128 MG/DL
FASTING STATUS PATIENT QL REPORTED: YES
FASTING STATUS PATIENT QL REPORTED: YES
GLUCOSE SERPL-MCNC: 126 MG/DL (ref 70–99)
HBA1C MFR BLD: 6.1 % (ref 0–5.6)
HCO3 SERPL-SCNC: 26 MMOL/L (ref 22–29)
HCT VFR BLD AUTO: 40.6 % (ref 40–53)
HDLC SERPL-MCNC: 59 MG/DL
HGB BLD-MCNC: 13.9 G/DL (ref 13.3–17.7)
HOLD SPECIMEN: NORMAL
LDLC SERPL CALC-MCNC: 86 MG/DL
MCH RBC QN AUTO: 32.7 PG (ref 26.5–33)
MCHC RBC AUTO-ENTMCNC: 34.2 G/DL (ref 31.5–36.5)
MCV RBC AUTO: 96 FL (ref 78–100)
NONHDLC SERPL-MCNC: 115 MG/DL
PLATELET # BLD AUTO: 243 10E3/UL (ref 150–450)
POTASSIUM SERPL-SCNC: 3.8 MMOL/L (ref 3.4–5.3)
PROT SERPL-MCNC: 7.3 G/DL (ref 6.4–8.3)
RBC # BLD AUTO: 4.25 10E6/UL (ref 4.4–5.9)
SODIUM SERPL-SCNC: 134 MMOL/L (ref 135–145)
TRIGL SERPL-MCNC: 145 MG/DL
URATE SERPL-MCNC: 5.6 MG/DL (ref 3.4–7)
WBC # BLD AUTO: 11.7 10E3/UL (ref 4–11)

## 2024-11-14 PROCEDURE — 99214 OFFICE O/P EST MOD 30 MIN: CPT | Mod: 25 | Performed by: FAMILY MEDICINE

## 2024-11-14 PROCEDURE — 85027 COMPLETE CBC AUTOMATED: CPT | Performed by: FAMILY MEDICINE

## 2024-11-14 PROCEDURE — 82043 UR ALBUMIN QUANTITATIVE: CPT | Performed by: FAMILY MEDICINE

## 2024-11-14 PROCEDURE — 82570 ASSAY OF URINE CREATININE: CPT | Performed by: FAMILY MEDICINE

## 2024-11-14 PROCEDURE — 36415 COLL VENOUS BLD VENIPUNCTURE: CPT | Performed by: FAMILY MEDICINE

## 2024-11-14 PROCEDURE — 80061 LIPID PANEL: CPT | Performed by: FAMILY MEDICINE

## 2024-11-14 PROCEDURE — 80053 COMPREHEN METABOLIC PANEL: CPT | Performed by: FAMILY MEDICINE

## 2024-11-14 PROCEDURE — G0439 PPPS, SUBSEQ VISIT: HCPCS | Performed by: FAMILY MEDICINE

## 2024-11-14 PROCEDURE — 83036 HEMOGLOBIN GLYCOSYLATED A1C: CPT | Performed by: FAMILY MEDICINE

## 2024-11-14 PROCEDURE — 84550 ASSAY OF BLOOD/URIC ACID: CPT | Performed by: FAMILY MEDICINE

## 2024-11-14 NOTE — PATIENT INSTRUCTIONS
Healthy Lifestyle   Nutrition and healthy eating: The Mediterranean Diet  Ready to switch to a more heart-healthy diet? Here's how to get started with the Mediterranean diet.  [facility]   If you're looking for a heart-healthy eating plan, the Mediterranean diet might be right for you.  The Mediterranean diet blends the basics of healthy eating with the traditional flavors and cooking methods of the Mediterranean.  Interest in the Mediterranean diet began in the 1960s with the observation that coronary heart disease caused fewer deaths in Mediterranean countries, such as [place] and [place], than in the [place] and northern [place]. Subsequent studies found that the Mediterranean diet is associated with reduced risk factors for cardiovascular disease.  The Mediterranean diet is one of the healthy eating plans recommended by the Dietary Guidelines for Americans to promote health and prevent chronic disease.  It is also recognized by the World Health Organization as a healthy and sustainable dietary pattern and as an intangible cultural asset by the United National Educational, Scientific and Cultural Organization.  The Mediterranean diet is a way of eating based on the traditional cuisine of countries bordering the [place] Sea. While there is no single definition of the Mediterranean diet, it is typically high in vegetables, fruits, whole grains, beans, nut and seeds, and olive oil.  The main components of Mediterranean diet include:  Daily consumption of vegetables, fruits, whole grains and healthy fats   Weekly intake of fish, poultry, beans and eggs   Moderate portions of dairy products   Limited intake of red meat  Other important elements of the Mediterranean diet are sharing meals with family and friends, enjoying a glass of red wine and being physically active.  The foundation of the Mediterranean diet is vegetables, fruits, herbs, nuts, beans and whole grains. Meals are built around these  "plant-based foods. Moderate amounts of dairy, poultry and eggs are also central to the Mediterranean Diet, as is seafood. In contrast, red meat is eaten only occasionally.  Healthy fats are a mainstay of the Mediterranean diet. They're eaten instead of less healthy fats, such as saturated and trans fats, which contribute to heart disease.  Olive oil is the primary source of added fat in the Mediterranean diet. Olive oil provides monounsaturated fat, which has been found to lower total cholesterol and low-density lipoprotein (LDL or \"bad\") cholesterol levels. Nuts and seeds also contain monounsaturated fat.  Fish are also important in the Mediterranean diet. Fatty fish -- such as mackerel, herring, sardines, albacore tuna, salmon and lake trout -- are rich in omega-3 fatty acids, a type of polyunsaturated fat that may reduce inflammation in the body. Omega-3 fatty acids also help decrease triglycerides, reduce blood clotting, and decrease the risk of stroke and heart failure.  The Mediterranean diet typically allows red wine in moderation. Although alcohol has been associated with a reduced risk of heart disease in some studies, it's by no means risk free. The Dietary Guidelines for Americans caution against beginning to drink or drinking more often on the basis of potential health benefits.  Interested in trying the Mediterranean diet? These tips will help you get started:  Eat more fruits and vegetables. Aim for 7 to 10 servings a day of fruit and vegetables.   Opt for whole grains. Switch to whole-grain bread, cereal and pasta. [place] with other whole grains, such as bulgur and farro.   Use healthy fats. Try olive oil as a replacement for butter when cooking. Instead of putting butter or margarine on bread, try dipping it in flavored olive oil.   Eat more seafood. Eat fish twice a week. Fresh or water-packed tuna, salmon, trout, mackerel and herring are healthy choices. Grilled fish tastes good and requires " little cleanup. Avoid deep-fried fish.   Reduce red meat. Substitute fish, poultry or beans for meat. If you eat meat, make sure it's lean and keep portions small.   Enjoy some dairy. Eat low-fat Greek or plain yogurt and small amounts of a variety of cheeses.   Spice it up. Herbs and spices boost flavor and lessen the need for salt.  The Mediterranean diet is a delicious and healthy way to eat. Many people who switch to this style of eating say they'll never eat any other way.

## 2024-11-14 NOTE — PROGRESS NOTES
Preventive Care Visit  [facility] VINEET Ruth MD, Family Medicine  Nov 14, 2024      Assessment & Plan       ICD-10-CM    1. Well adult exam  Z00.00 Comprehensive metabolic panel     CBC with Platelets and Reflex to Iron Studies     Comprehensive metabolic panel     CBC with Platelets and Reflex to Iron Studies      2. Need for vaccination against respiratory syncytial virus  Z29.11 DISCONTINUED: RSV vaccine, bivalent, ABRYSVO, injection      3. Diabetic polyneuropathy associated with type 2 diabetes mellitus (H)  E11.42 HEMOGLOBIN A1C     HEMOGLOBIN A1C      4. Type 2 diabetes mellitus with stage 3a chronic kidney disease, without long-term current use of insulin (H)  E11.22 Albumin Random Urine Quantitative with Creat Ratio    N18.31 Comprehensive metabolic panel     Albumin Random Urine Quantitative with Creat Ratio     Comprehensive metabolic panel      5. Encounter for lipid screening for cardiovascular disease  Z13.220 Lipid panel reflex to direct LDL Fasting    Z13.6 Lipid panel reflex to direct LDL Fasting      6. Chronic gout without tophus, unspecified cause, unspecified site  M1A.[id number] Uric acid     Uric acid      7. Essential hypertension  I10             Patient has been advised of split billing requirements and indicates understanding: Yes        Counseling  Appropriate preventive services were addressed with this patient via screening, questionnaire, or discussion as appropriate for fall prevention, nutrition, physical activity, Tobacco-use cessation, social engagement, weight loss and cognition.  Checklist reviewing preventive services available has been given to the patient.  Reviewed patient's diet, addressing concerns and/or questions.   He is at risk for lack of exercise and has been provided with information to increase physical activity for the benefit of his well-being.   The patient was instructed to see the dentist every 6 months.       Patient Instructions      Healthy Lifestyle   Nutrition and healthy eating: The Mediterranean Diet  Ready to switch to a more heart-healthy diet? Here's how to get started with the Mediterranean diet.  By [facility] Staff   If you're looking for a heart-healthy eating plan, the Mediterranean diet might be right for you.  The Mediterranean diet blends the basics of healthy eating with the traditional flavors and cooking methods of the [place].  Interest in the Mediterranean diet began in the 1960s with the observation that coronary heart disease caused fewer deaths in Mediterranean countries, such as [place] and [place], than in the [place] and [place]. Subsequent studies found that the Mediterranean diet is associated with reduced risk factors for cardiovascular disease.  The Mediterranean diet is one of the healthy eating plans recommended by the Dietary Guidelines for Americans to promote health and prevent chronic disease.  It is also recognized by the World Health Organization as a healthy and sustainable dietary pattern and as an intangible cultural asset by the United National Educational, Scientific and Cultural Organization.  The Mediterranean diet is a way of eating based on the traditional cuisine of countries bordering the [place] Sea. While there is no single definition of the Mediterranean diet, it is typically high in vegetables, fruits, whole grains, beans, nut and seeds, and olive oil.  The main components of Mediterranean diet include:  Daily consumption of vegetables, fruits, whole grains and healthy fats   Weekly intake of fish, poultry, beans and eggs   Moderate portions of dairy products   Limited intake of red meat  Other important elements of the Mediterranean diet are sharing meals with family and friends, enjoying a glass of red wine and being physically active.  The foundation of the Mediterranean diet is vegetables, fruits, herbs, nuts, beans and whole grains. Meals are built around these  "plant-based foods. Moderate amounts of dairy, poultry and eggs are also central to the Mediterranean Diet, as is seafood. In contrast, red meat is eaten only occasionally.  Healthy fats are a mainstay of the Mediterranean diet. They're eaten instead of less healthy fats, such as saturated and trans fats, which contribute to heart disease.  Olive oil is the primary source of added fat in the Mediterranean diet. Olive oil provides monounsaturated fat, which has been found to lower total cholesterol and low-density lipoprotein (LDL or \"bad\") cholesterol levels. Nuts and seeds also contain monounsaturated fat.  Fish are also important in the Mediterranean diet. Fatty fish -- such as mackerel, herring, sardines, albacore tuna, salmon and lake trout -- are rich in omega-3 fatty acids, a type of polyunsaturated fat that may reduce inflammation in the body. Omega-3 fatty acids also help decrease triglycerides, reduce blood clotting, and decrease the risk of stroke and heart failure.  The Mediterranean diet typically allows red wine in moderation. Although alcohol has been associated with a reduced risk of heart disease in some studies, it's by no means risk free. The Dietary Guidelines for Americans caution against beginning to drink or drinking more often on the basis of potential health benefits.  Interested in trying the Mediterranean diet? These tips will help you get started:  Eat more fruits and vegetables. Aim for 7 to 10 servings a day of fruit and vegetables.   Opt for whole grains. Switch to whole-grain bread, cereal and pasta. [place] with other whole grains, such as bulgur and farro.   Use healthy fats. Try olive oil as a replacement for butter when cooking. Instead of putting butter or margarine on bread, try dipping it in flavored olive oil.   Eat more seafood. Eat fish twice a week. Fresh or water-packed tuna, salmon, trout, mackerel and herring are healthy choices. Grilled fish tastes good and requires " little cleanup. Avoid deep-fried fish.   Reduce red meat. Substitute fish, poultry or beans for meat. If you eat meat, make sure it's lean and keep portions small.   Enjoy some dairy. Eat low-fat Greek or plain yogurt and small amounts of a variety of cheeses.   Spice it up. Herbs and spices boost flavor and lessen the need for salt.  The Mediterranean diet is a delicious and healthy way to eat. Many people who switch to this style of eating say they'll never eat any other way.      Dora Mann is a 84 year old, presenting for the following:  Physical        11/14/2024    10:32 AM   Additional Questions   Roomed by Ellen   Accompanied by none         11/14/2024    10:32 AM   Patient Reported Additional Medications   Patient reports taking the following new medications none           HPI  BP Readings from Last 6 Encounters:   11/14/24 (!) 151/88   08/07/24 (!) 154/74   05/22/24 132/74   05/13/24 137/82   01/08/24 (!) 179/91   12/11/23 (!) 147/77     Hemoglobin A1C   Date Value Ref Range Status   11/14/2024 6.1 (H) 0.0 - 5.6 % Final     Comment:     Normal <5.7%   Prediabetes 5.7-6.4%    Diabetes 6.5% or higher     Note: Adopted from ADA consensus guidelines.   05/22/2024 6.6 (H) 0.0 - 5.6 % Final     Comment:     Normal <5.7%   Prediabetes 5.7-6.4%    Diabetes 6.5% or higher     Note: Adopted from ADA consensus guidelines.   12/11/2023 6.2 (H) 0.0 - 5.6 % Final     Comment:     Normal <5.7%   Prediabetes 5.7-6.4%    Diabetes 6.5% or higher     Note: Adopted from ADA consensus guidelines.   03/03/2021 6.6 (H) 0 - 5.6 % Final     Comment:     Normal <5.7% Prediabetes 5.7-6.4%  Diabetes 6.5% or higher - adopted from ADA   consensus guidelines.     09/17/2020 5.9 (H) 0 - 5.6 % Final     Comment:     Normal <5.7% Prediabetes 5.7-6.4%  Diabetes 6.5% or higher - adopted from ADA   consensus guidelines.     10/30/2019 6.3 (H) 0 - 5.6 % Final     Comment:     Normal <5.7% Prediabetes 5.7-6.4%  Diabetes 6.5% or higher -  adopted from ADA   consensus guidelines.       Hypertension  Patient states pressures at home are well-controlled.      History of diabetes  Overall well-controlled  Taking metformin        Health Care Directive  Patient has a Health Care Directive on file  Advance care planning document is on file and is current.      11/13/2024   General Health   How would you rate your overall physical health? Good   Feel stress (tense, anxious, or unable to sleep) Not at all            11/13/2024   Nutrition   Diet: Diabetic            11/13/2024   Exercise   Days per week of moderate/strenous exercise 1 day   Average minutes spent exercising at this level 10 min      (!) EXERCISE CONCERN      11/13/2024   Social Factors   Frequency of gathering with friends or relatives Three times a week   Worry food won't last until get money to buy more No   Food not last or not have enough money for food? No   Do you have housing? (Housing is defined as stable permanent housing and does not include staying ouside in a car, in a tent, in an abandoned building, in an overnight shelter, or couch-surfing.) No   Are you worried about losing your housing? No   Lack of transportation? No   Unable to get utilities (heat,electricity)? No   Want help with housing or utility concern? No      (!) HOUSING CONCERN PRESENT      11/14/2024   Fall Risk   Gait Speed Test (Document in seconds) 4.63   Gait Speed Test Interpretation Less than or equal to 5.00 seconds - PASS             11/13/2024   Activities of Daily Living- Home Safety   Needs help with the following daily activites None of the above   Safety concerns in the home None of the above            11/13/2024   Dental   Dentist two times every year? (!) NO            11/13/2024   Hearing Screening   Hearing concerns? None of the above            11/13/2024   Driving Risk Screening   Patient/family members have concerns about driving No            11/13/2024   General Alertness/Fatigue Screening    Have you been more tired than usual lately? No            11/13/2024   Urinary Incontinence Screening   Bothered by leaking urine in past 6 months No            8/6/2024   TB Screening   Were you born outside of the [place]? No            Today's PHQ-2 Score:       11/13/2024     9:37 AM   PHQ-2 ( 1999 [employer])   Q1: Little interest or pleasure in doing things 0    Q2: Feeling down, depressed or hopeless 0    PHQ-2 Score 0    Q1: Little interest or pleasure in doing things Not at all   Q2: Feeling down, depressed or hopeless Not at all   PHQ-2 Score 0       Patient-reported           11/13/2024   Substance Use   Alcohol more than 3/day or more than 7/wk No   Do you have a current opioid prescription? No   How severe/bad is pain from 1 to 10? 6/10   Do you use any other substances recreationally? No        Social History     Tobacco Use    Smoking status: Former     Types: Cigars    Smokeless tobacco: Never   Vaping Use    Vaping status: Never Used   Substance Use Topics    Alcohol use: Yes     Alcohol/week: 5.8 standard drinks of alcohol     Comment: occasional     Drug use: No                 Reviewed and updated as needed this visit by Provider                    BP Readings from Last 3 Encounters:   11/14/24 (!) 151/88   08/07/24 (!) 154/74   05/22/24 132/74    Wt Readings from Last 3 Encounters:   11/14/24 96.8 kg (213 lb 6.4 oz)   08/07/24 96.6 kg (213 lb)   05/22/24 95.7 kg (211 lb)                  Current providers sharing in care for this patient include:  Patient Care Team:  Francisca Bautista MD as PCP - General (Family Medicine)  Francisca Bautista MD as Assigned PCP  Yash Valverde MD as Assigned Musculoskeletal Provider  Mindi Espinoza AuD as Audiologist (Audiology)  Johnny Cowan MD as MD (Ophthalmology)  Johnny Cowan MD as Assigned Surgical Provider    The following health maintenance items are reviewed in Epic and correct as of today:  Health Maintenance   Topic Date Due  "   RSV VACCINE (1 - 1-dose 75+ series) Never done    INFLUENZA VACCINE (1) 09/01/2024    COVID-19 Vaccine (9 - 2024-25 season) 09/01/2024    MICROALBUMIN  12/11/2024    DIABETIC FOOT EXAM  12/11/2024    A1C  05/14/2025    ANNUAL REVIEW OF HM ORDERS  08/07/2025    EYE EXAM  09/19/2025    MEDICARE ANNUAL WELLNESS VISIT  11/14/2025    BMP  11/14/2025    LIPID  11/14/2025    FALL RISK ASSESSMENT  11/14/2025    URIC ACID  11/14/2025    HEMOGLOBIN  11/14/2025    ADVANCE CARE PLANNING  11/14/2029    DTAP/TDAP/TD IMMUNIZATION (3 - Td or Tdap) 05/23/2033    PHQ-2 (once per calendar year)  Completed    Pneumococcal Vaccine: 65+ Years  Completed    URINALYSIS  Completed    ZOSTER IMMUNIZATION  Completed    HPV IMMUNIZATION  Aged Out    MENINGITIS IMMUNIZATION  Aged Out    RSV MONOCLONAL ANTIBODY  Aged Out    COLORECTAL CANCER SCREENING  Discontinued         Review of Systems  Constitutional, HEENT, cardiovascular, pulmonary, gi and gu systems are negative, except as otherwise noted.     Objective    Exam  BP (!) 151/88   Pulse 73   Temp 97.4  F (36.3  C) (Temporal)   Resp 16   Ht 1.684 m (5' 6.3\")   Wt 96.8 kg (213 lb 6.4 oz)   SpO2 98%   BMI 34.13 kg/m     Estimated body mass index is 34.13 kg/m  as calculated from the following:    Height as of this encounter: 1.684 m (5' 6.3\").    Weight as of this encounter: 96.8 kg (213 lb 6.4 oz).    Physical Exam  Vitals reviewed.   Constitutional:       Appearance: Normal appearance. He is not ill-appearing.   HENT:      Head: Normocephalic.      Right Ear: Tympanic membrane and ear canal normal.      Left Ear: Tympanic membrane and ear canal normal.      Nose: Nose normal.   Eyes:      Extraocular Movements: Extraocular movements intact.   Cardiovascular:      Rate and Rhythm: Normal rate and regular rhythm.      Heart sounds: Normal heart sounds. No murmur heard.  Pulmonary:      Effort: Pulmonary effort is normal. No respiratory distress.      Breath sounds: Normal breath " sounds. No wheezing or rales.   Abdominal:      Palpations: Abdomen is soft.   Musculoskeletal:         General: Normal range of motion.      Cervical back: Normal range of motion and neck supple.   Skin:     General: Skin is warm and dry.      Findings: No lesion.   Neurological:      Mental Status: He is alert and oriented to person, place, and time.   Psychiatric:         Mood and Affect: Mood normal.         Behavior: Behavior normal.         Thought Content: Thought content normal.         Judgment: Judgment normal.               11/14/2024   Mini Cog   Clock Draw Score 2 Normal   3 Item Recall 3 objects recalled   Mini Cog Total Score 5                 Signed Electronically by: Jose Ruth MD

## 2024-11-15 LAB
CREAT UR-MCNC: 128 MG/DL
MICROALBUMIN UR-MCNC: 491 MG/L
MICROALBUMIN/CREAT UR: 383.59 MG/G CR (ref 0–17)

## 2024-11-18 DIAGNOSIS — N18.32 STAGE 3B CHRONIC KIDNEY DISEASE (H): ICD-10-CM

## 2024-11-18 DIAGNOSIS — R80.9 ALBUMINURIA: Primary | ICD-10-CM

## 2024-12-03 DIAGNOSIS — N18.32 CHRONIC KIDNEY DISEASE (CKD) STAGE G3B/A1, MODERATELY DECREASED GLOMERULAR FILTRATION RATE (GFR) BETWEEN 30-44 ML/MIN/1.73 SQUARE METER AND ALBUMINURIA CREATININE RATIO LESS THAN 30 MG/G (H): Primary | ICD-10-CM

## 2025-01-28 DIAGNOSIS — N18.31 TYPE 2 DIABETES MELLITUS WITH STAGE 3A CHRONIC KIDNEY DISEASE, WITHOUT LONG-TERM CURRENT USE OF INSULIN (H): ICD-10-CM

## 2025-01-28 DIAGNOSIS — E11.22 TYPE 2 DIABETES MELLITUS WITH STAGE 3A CHRONIC KIDNEY DISEASE, WITHOUT LONG-TERM CURRENT USE OF INSULIN (H): ICD-10-CM

## 2025-02-17 DIAGNOSIS — R89.4 NONSPECIFIC IMMUNOLOGICAL FINDINGS: Primary | ICD-10-CM

## 2025-03-01 ENCOUNTER — MYC MEDICAL ADVICE (OUTPATIENT)
Dept: FAMILY MEDICINE | Facility: CLINIC | Age: 85
End: 2025-03-01
Payer: COMMERCIAL

## 2025-03-01 DIAGNOSIS — N18.31 TYPE 2 DIABETES MELLITUS WITH STAGE 3A CHRONIC KIDNEY DISEASE, WITHOUT LONG-TERM CURRENT USE OF INSULIN (H): Primary | ICD-10-CM

## 2025-03-01 DIAGNOSIS — E11.22 TYPE 2 DIABETES MELLITUS WITH STAGE 3A CHRONIC KIDNEY DISEASE, WITHOUT LONG-TERM CURRENT USE OF INSULIN (H): Primary | ICD-10-CM

## 2025-03-06 ENCOUNTER — MYC MEDICAL ADVICE (OUTPATIENT)
Dept: FAMILY MEDICINE | Facility: CLINIC | Age: 85
End: 2025-03-06

## 2025-03-06 RX ORDER — LANCETS
EACH MISCELLANEOUS
Qty: 100 EACH | Refills: 6 | Status: SHIPPED | OUTPATIENT
Start: 2025-03-06

## 2025-03-06 NOTE — TELEPHONE ENCOUNTER
Maribel message sent to patient to inquire.    Joes Armijo MD  [facility] - Primary Care; [facility] Pool34 minutes ago (1:43 PM)     MM  Please fadi up medication and pharmacy requested    MD Saleem Holliday Bambi L routed conversation to Jose Armijo MD4 hours ago (9:37 AM)     Zoey DALE [facility] [place]4 hours ago (9:26 AM)       Topic: Non-Medical Question.     I sent a request for you to order a new blood sugar testing machine from my wifes my chart by mistake and  was supposed to send you a message on mine.  I received a msg wanting to know what pharmacy to order it from and I replied [facility] in [place].  They then told me to send you a msg from my own My Chart to you. Do not know why but did as I was told. Sorry for the mix-up.  Zoey Romo

## 2025-03-27 ENCOUNTER — TELEPHONE (OUTPATIENT)
Dept: FAMILY MEDICINE | Facility: CLINIC | Age: 85
End: 2025-03-27

## 2025-03-27 ENCOUNTER — OFFICE VISIT (OUTPATIENT)
Dept: FAMILY MEDICINE | Facility: CLINIC | Age: 85
End: 2025-03-27
Payer: COMMERCIAL

## 2025-03-27 VITALS
TEMPERATURE: 98.2 F | BODY MASS INDEX: 34.01 KG/M2 | HEIGHT: 66 IN | HEART RATE: 84 BPM | SYSTOLIC BLOOD PRESSURE: 178 MMHG | WEIGHT: 211.6 LBS | OXYGEN SATURATION: 99 % | DIASTOLIC BLOOD PRESSURE: 93 MMHG | RESPIRATION RATE: 16 BRPM

## 2025-03-27 DIAGNOSIS — N18.32 STAGE 3B CHRONIC KIDNEY DISEASE (H): ICD-10-CM

## 2025-03-27 DIAGNOSIS — L30.9 DERMATITIS: ICD-10-CM

## 2025-03-27 DIAGNOSIS — Z00.00 WELL ADULT EXAM: Primary | ICD-10-CM

## 2025-03-27 DIAGNOSIS — R73.9 HYPERGLYCEMIA: ICD-10-CM

## 2025-03-27 DIAGNOSIS — E11.42 DIABETIC POLYNEUROPATHY ASSOCIATED WITH TYPE 2 DIABETES MELLITUS (H): ICD-10-CM

## 2025-03-27 DIAGNOSIS — I10 UNCONTROLLED HYPERTENSION: ICD-10-CM

## 2025-03-27 DIAGNOSIS — Z13.6 ENCOUNTER FOR LIPID SCREENING FOR CARDIOVASCULAR DISEASE: ICD-10-CM

## 2025-03-27 DIAGNOSIS — Z13.220 ENCOUNTER FOR LIPID SCREENING FOR CARDIOVASCULAR DISEASE: ICD-10-CM

## 2025-03-27 DIAGNOSIS — Z12.5 SCREENING FOR PROSTATE CANCER: ICD-10-CM

## 2025-03-27 DIAGNOSIS — Z00.00 ENCOUNTER FOR MEDICARE ANNUAL WELLNESS EXAM: ICD-10-CM

## 2025-03-27 DIAGNOSIS — Z29.11 NEED FOR VACCINATION AGAINST RESPIRATORY SYNCYTIAL VIRUS: ICD-10-CM

## 2025-03-27 LAB
ALBUMIN SERPL BCG-MCNC: 4.2 G/DL (ref 3.5–5.2)
ALP SERPL-CCNC: 27 U/L (ref 40–150)
ALT SERPL W P-5'-P-CCNC: 19 U/L (ref 0–70)
ANION GAP SERPL CALCULATED.3IONS-SCNC: 11 MMOL/L (ref 7–15)
AST SERPL W P-5'-P-CCNC: 23 U/L (ref 0–45)
BILIRUB SERPL-MCNC: 0.4 MG/DL
BUN SERPL-MCNC: 28.7 MG/DL (ref 8–23)
CALCIUM SERPL-MCNC: 11.3 MG/DL (ref 8.8–10.4)
CHLORIDE SERPL-SCNC: 98 MMOL/L (ref 98–107)
CHOLEST SERPL-MCNC: 166 MG/DL
CREAT SERPL-MCNC: 1.49 MG/DL (ref 0.67–1.17)
EGFRCR SERPLBLD CKD-EPI 2021: 45 ML/MIN/1.73M2
EST. AVERAGE GLUCOSE BLD GHB EST-MCNC: 131 MG/DL
FASTING STATUS PATIENT QL REPORTED: ABNORMAL
FASTING STATUS PATIENT QL REPORTED: NORMAL
GLUCOSE SERPL-MCNC: 104 MG/DL (ref 70–99)
HBA1C MFR BLD: 6.2 % (ref 0–5.6)
HCO3 SERPL-SCNC: 26 MMOL/L (ref 22–29)
HDLC SERPL-MCNC: 69 MG/DL
LDLC SERPL CALC-MCNC: 71 MG/DL
NONHDLC SERPL-MCNC: 97 MG/DL
POTASSIUM SERPL-SCNC: 3.8 MMOL/L (ref 3.4–5.3)
PROT SERPL-MCNC: 7.3 G/DL (ref 6.4–8.3)
PSA SERPL DL<=0.01 NG/ML-MCNC: 2.7 NG/ML
SODIUM SERPL-SCNC: 135 MMOL/L (ref 135–145)
TRIGL SERPL-MCNC: 129 MG/DL

## 2025-03-27 RX ORDER — AMMONIUM LACTATE 12 G/100G
CREAM TOPICAL 2 TIMES DAILY PRN
Qty: 385 G | Refills: 11 | Status: SHIPPED | OUTPATIENT
Start: 2025-03-27

## 2025-03-27 SDOH — HEALTH STABILITY: PHYSICAL HEALTH: ON AVERAGE, HOW MANY MINUTES DO YOU ENGAGE IN EXERCISE AT THIS LEVEL?: 0 MIN

## 2025-03-27 SDOH — HEALTH STABILITY: PHYSICAL HEALTH: ON AVERAGE, HOW MANY DAYS PER WEEK DO YOU ENGAGE IN MODERATE TO STRENUOUS EXERCISE (LIKE A BRISK WALK)?: 0 DAYS

## 2025-03-27 ASSESSMENT — SOCIAL DETERMINANTS OF HEALTH (SDOH): HOW OFTEN DO YOU GET TOGETHER WITH FRIENDS OR RELATIVES?: ONCE A WEEK

## 2025-03-27 NOTE — PROGRESS NOTES
"Preventive Care Visit  [facility] VINEET Ruth MD, Family Medicine  Mar 27, 2025      Assessment & Plan       ICD-10-CM    1. Well adult exam  Z00.00 Comprehensive metabolic panel     Hemoglobin A1c     Lipid panel reflex to direct LDL Non-fasting     Prostate Specific Antigen Screen     Comprehensive metabolic panel     Hemoglobin A1c     Lipid panel reflex to direct LDL Non-fasting     Prostate Specific Antigen Screen      2. Need for vaccination against respiratory syncytial virus  Z29.11       3. Screening for prostate cancer  Z12.5 Prostate Specific Antigen Screen     Prostate Specific Antigen Screen      4. Encounter for lipid screening for cardiovascular disease  Z13.220 Lipid panel reflex to direct LDL Non-fasting    Z13.6 Lipid panel reflex to direct LDL Non-fasting      5. Hyperglycemia  R73.9 Hemoglobin A1c     Hemoglobin A1c      6. Dermatitis  L30.9 ammonium lactate (LAC-HYDRIN) 12 % external cream      7. Stage 3b chronic kidney disease (H)  N18.32       8. Diabetic polyneuropathy associated with type 2 diabetes mellitus (H)  E11.42       9. Uncontrolled hypertension  I10       10. Encounter for Medicare annual wellness exam  Z00.00             Patient has been advised of split billing requirements and indicates understanding: Yes    Hypertension  Recommend checking pressures at home  Blood pressure log given, follow-up in 4 weeks    BMI  Estimated body mass index is 33.84 kg/m  as calculated from the following:    Height as of this encounter: 1.684 m (5' 6.3\").    Weight as of this encounter: 96 kg (211 lb 9.6 oz).   Weight management plan: Discussed healthy diet and exercise guidelines    Counseling  Appropriate preventive services were addressed with this patient via screening, questionnaire, or discussion as appropriate for fall prevention, nutrition, physical activity, Tobacco-use cessation, social engagement, weight loss and cognition.  Checklist reviewing preventive " services available has been given to the patient.  Reviewed patient's diet, addressing concerns and/or questions.   The patient was instructed to see the dentist every 6 months.       Patient Instructions   Patient Education  Preventive Care Advice   This is general advice given by our system to help you stay healthy. However, your care team may have specific advice just for you. Please talk to your care team about your preventive care needs.  Nutrition  Eat 5 or more servings of fruits and vegetables each day.  Try wheat bread, brown rice and whole grain pasta (instead of white bread, rice, and pasta).  Get enough calcium and vitamin D. Check the label on foods and aim for 100% of the RDA (recommended daily allowance).  Lifestyle  Exercise at least 150 minutes each week  (30 minutes a day, 5 days a week).  Do muscle strengthening activities 2 days a week. These help control your weight and prevent disease.  No smoking.  Wear sunscreen to prevent skin cancer.  Have a dental exam and cleaning every 6 months.  Yearly exams  See your health care team every year to talk about:  Any changes in your health.  Any medicines your care team has prescribed.  Preventive care, family planning, and ways to prevent chronic diseases.  Shots (vaccines)   HPV shots (up to age 26), if you've never had them before.  Hepatitis B shots (up to age 59), if you've never had them before.  COVID-19 shot: Get this shot when it's due.  Flu shot: Get a flu shot every year.  Tetanus shot: Get a tetanus shot every 10 years.  Pneumococcal, hepatitis A, and RSV shots: Ask your care team if you need these based on your risk.  Shingles shot (for age 50 and up)  General health tests  Diabetes screening:  Starting at age 35, Get screened for diabetes at least every 3 years.  If you are younger than age 35, ask your care team if you should be screened for diabetes.  Cholesterol test: At age 39, start having a cholesterol test every 5 years, or more often  if advised.  Bone density scan (DEXA): At age 50, ask your care team if you should have this scan for osteoporosis (brittle bones).  Hepatitis C: Get tested at least once in your life.  STIs (sexually transmitted infections)  Before age 24: Ask your care team if you should be screened for STIs.  After age 24: Get screened for STIs if you're at risk. You are at risk for STIs (including HIV) if:  You are sexually active with more than one person.  You don't use condoms every time.  You or a partner was diagnosed with a sexually transmitted infection.  If you are at risk for HIV, ask about PrEP medicine to prevent HIV.  Get tested for HIV at least once in your life, whether you are at risk for HIV or not.  Cancer screening tests  Cervical cancer screening: If you have a cervix, begin getting regular cervical cancer screening tests starting at age 21.  Breast cancer scan (mammogram): If you've ever had breasts, begin having regular mammograms starting at age 40. This is a scan to check for breast cancer.  Colon cancer screening: It is important to start screening for colon cancer at age 45.  Have a colonoscopy test every 10 years (or more often if you're at risk) Or, ask your provider about stool tests like a FIT test every year or Cologuard test every 3 years.  To learn more about your testing options, visit:   .  For help making a decision, visit:   https://bit.ly/nh80462.  Prostate cancer screening test: If you have a prostate, ask your care team if a prostate cancer screening test (PSA) at age 55 is right for you.  Lung cancer screening: If you are a current or former smoker ages 50 to 80, ask your care team if ongoing lung cancer screenings are right for you.  For informational purposes only. Not to replace the advice of your health care provider. Copyright   2023 [employer]. All rights reserved. Clinically reviewed by the [facility] Transitions Program. [employer] [id number] - REV 01/24.          Dora Greer is a 85 year old, presenting for the following:  Physical        3/27/2025     3:09 PM   Additional Questions   Roomed by Ellen   Accompanied by none         3/27/2025     3:09 PM   Patient Reported Additional Medications   Patient reports taking the following new medications none           *Informed patient that last wellness exam 11/14/2024 and insurance may not cover this visit.          BP Readings from Last 6 Encounters:   03/27/25 (!) 178/93   11/14/24 (!) 151/88   08/07/24 (!) 154/74   05/22/24 132/74   05/13/24 137/82   01/08/24 (!) 179/91       History of Present Illness       Reason for visit:  Provider Visit    He eats 2-3 servings of fruits and vegetables daily.He consumes 1 sweetened beverage(s) daily.He exercises with enough effort to increase his heart rate 9 or less minutes per day.  He exercises with enough effort to increase his heart rate 3 or less days per week.   He is taking medications regularly.      Hypertension  Unknown control at home    Needs medication refill for dermatitis           Advance Care Planning  Patient has a Health Care Directive on file  Advance care planning document is on file and is current.      3/27/2025   General Health   How would you rate your overall physical health? Good   Feel stress (tense, anxious, or unable to sleep) Not at all         3/27/2025   Nutrition   Diet: Regular (no restrictions)         3/27/2025   Exercise   Days per week of moderate/strenous exercise 0 days   Average minutes spent exercising at this level 0 min   (!) EXERCISE CONCERN      3/27/2025   Social Factors   Frequency of gathering with friends or relatives Once a week   Worry food won't last until get money to buy more Patient declined   Food not last or not have enough money for food? Patient declined   Do you have housing? (Housing is defined as stable permanent housing and does not include staying ouside in a car, in a tent, in an abandoned building, in an  overnight shelter, or couch-surfing.) Patient declined   Are you worried about losing your housing? Patient declined   Lack of transportation? Patient declined   Unable to get utilities (heat,electricity)? Patient declined         3/27/2025   Fall Risk   Fallen 2 or more times in the past year? No    Trouble with walking or balance? No        Proxy-reported          3/27/2025   Activities of Daily Living- Home Safety   Needs help with the following daily activites None of the above   Safety concerns in the home None of the above         3/27/2025   Dental   Dentist two times every year? (!) NO         3/27/2025   Hearing Screening   Hearing concerns? None of the above         3/27/2025   Driving Risk Screening   Patient/family members have concerns about driving No         3/27/2025   General Alertness/Fatigue Screening   Have you been more tired than usual lately? No         3/27/2025   Urinary Incontinence Screening   Bothered by leaking urine in past 6 months No           2024   TB Screening   Were you born outside of the [place]? No           Today's PHQ-2 Score:       3/27/2025     3:08 PM   PHQ-2 (  [employer])   Q1: Little interest or pleasure in doing things 0    Q2: Feeling down, depressed or hopeless 0    PHQ-2 Score 0    Q1: Little interest or pleasure in doing things Not at all   Q2: Feeling down, depressed or hopeless Not at all   PHQ-2 Score 0       Proxy-reported           3/27/2025   Substance Use   Alcohol more than 3/day or more than 7/wk No   Do you have a current opioid prescription? No   How severe/bad is pain from 1 to 10? 2/10   Do you use any other substances recreationally? No     Social History     Tobacco Use    Smoking status: Former     Current packs/day: 0.00     Average packs/day: 0.5 packs/day for 10.0 years (5.0 ttl pk-yrs)     Types: Cigars, Cigarettes     Quit date: 1985     Years since quittin.2    Smokeless tobacco: Never   Vaping Use    Vaping status: Never Used    Substance Use Topics    Alcohol use: Yes     Alcohol/week: 5.8 standard drinks of alcohol     Comment: occasional     Drug use: No             Reviewed and updated as needed this visit by Provider                    BP Readings from Last 3 Encounters:   03/27/25 (!) 178/93   11/14/24 (!) 151/88   08/07/24 (!) 154/74    Wt Readings from Last 3 Encounters:   03/27/25 96 kg (211 lb 9.6 oz)   11/14/24 96.8 kg (213 lb 6.4 oz)   08/07/24 96.6 kg (213 lb)                  Current providers sharing in care for this patient include:  Patient Care Team:  Jose Armijo MD as PCP - General (Family Medicine)  Francisca Bautista MD as Assigned PCP  Yash Valverde MD as Assigned Musculoskeletal Provider  Mindi Espinoza AuD as Audiologist (Audiology)  Johnny Cowan MD as MD (Ophthalmology)  Johnny Cowan MD as Assigned Surgical Provider    The following health maintenance items are reviewed in Epic and correct as of today:  Health Maintenance   Topic Date Due    INFLUENZA VACCINE (1) 09/01/2024    COVID-19 Vaccine (9 - 2024-25 season) 09/01/2024    DIABETIC FOOT EXAM  12/11/2024    EYE EXAM  09/19/2025    A1C  09/27/2025    MICROALBUMIN  11/14/2025    URIC ACID  11/14/2025    HEMOGLOBIN  11/14/2025    MEDICARE ANNUAL WELLNESS VISIT  03/27/2026    BMP  03/27/2026    LIPID  03/27/2026    ANNUAL REVIEW OF HM ORDERS  03/27/2026    FALL RISK ASSESSMENT  03/27/2026    ADVANCE CARE PLANNING  03/27/2030    DTAP/TDAP/TD IMMUNIZATION (3 - Td or Tdap) 05/23/2033    PHQ-2 (once per calendar year)  Completed    Pneumococcal Vaccine: 50+ Years  Completed    URINALYSIS  Completed    ZOSTER IMMUNIZATION  Completed    RSV VACCINE  Completed    HPV IMMUNIZATION  Aged Out    MENINGITIS IMMUNIZATION  Aged Out    COLORECTAL CANCER SCREENING  Discontinued         Review of Systems  Constitutional, HEENT, cardiovascular, pulmonary, gi and gu systems are negative, except as otherwise noted.     Objective   "  Exam  BP (!) 178/93   Pulse 84   Temp 98.2  F (36.8  C) (Temporal)   Resp 16   Ht 1.684 m (5' 6.3\")   Wt 96 kg (211 lb 9.6 oz)   SpO2 99%   BMI 33.84 kg/m     Estimated body mass index is 33.84 kg/m  as calculated from the following:    Height as of this encounter: 1.684 m (5' 6.3\").    Weight as of this encounter: 96 kg (211 lb 9.6 oz).    Physical Exam  Vitals reviewed.   Constitutional:       Appearance: Normal appearance. He is not ill-appearing.   HENT:      Head: Normocephalic.      Right Ear: Tympanic membrane and ear canal normal.      Left Ear: Tympanic membrane and ear canal normal.      Nose: Nose normal.   Eyes:      Extraocular Movements: Extraocular movements intact.   Cardiovascular:      Rate and Rhythm: Normal rate and regular rhythm.      Heart sounds: Normal heart sounds. No murmur heard.  Pulmonary:      Effort: Pulmonary effort is normal. No respiratory distress.      Breath sounds: Normal breath sounds. No wheezing or rales.   Abdominal:      Palpations: Abdomen is soft.   Musculoskeletal:         General: Normal range of motion.      Cervical back: Normal range of motion and neck supple.   Skin:     General: Skin is warm and dry.      Findings: No lesion.   Neurological:      Mental Status: He is alert and oriented to person, place, and time.   Psychiatric:         Mood and Affect: Mood normal.         Behavior: Behavior normal.         Thought Content: Thought content normal.         Judgment: Judgment normal.               3/27/2025   Mini Cog   Clock Draw Score 2 Normal   3 Item Recall 2 objects recalled   Mini Cog Total Score 4              Signed Electronically by: Jose Ruth MD    "

## 2025-03-27 NOTE — TELEPHONE ENCOUNTER
Patient's wife Felicita called in (no consent to communicate on file). She states that [facility] told them that pt can have an annual wellness visit once a year anytime between January 1st and December 31st, regardless of when the last visit is. She states she was told previously by someone at [facility] that visits have to be 1 year and 1 day apart. She said that pt has a wellness visit scheduled for today and she wants to make sure they don't get a bill for this. Pt requested to be transferred to the billing department. Offered her the phone number for billing but she declined and just requested to be transferred. Transferred her to billing as requested.    Cecile UMAÑA RN  [facility]

## 2025-03-28 DIAGNOSIS — E83.52 HYPERCALCEMIA: Primary | ICD-10-CM

## 2025-03-28 NOTE — PATIENT INSTRUCTIONS
Patient Education   Preventive Care Advice   This is general advice given by our system to help you stay healthy. However, your care team may have specific advice just for you. Please talk to your care team about your preventive care needs.  Nutrition  Eat 5 or more servings of fruits and vegetables each day.  Try wheat bread, brown rice and whole grain pasta (instead of white bread, rice, and pasta).  Get enough calcium and vitamin D. Check the label on foods and aim for 100% of the RDA (recommended daily allowance).  Lifestyle  Exercise at least 150 minutes each week  (30 minutes a day, 5 days a week).  Do muscle strengthening activities 2 days a week. These help control your weight and prevent disease.  No smoking.  Wear sunscreen to prevent skin cancer.  Have a dental exam and cleaning every 6 months.  Yearly exams  See your health care team every year to talk about:  Any changes in your health.  Any medicines your care team has prescribed.  Preventive care, family planning, and ways to prevent chronic diseases.  Shots (vaccines)   HPV shots (up to age 26), if you've never had them before.  Hepatitis B shots (up to age 59), if you've never had them before.  COVID-19 shot: Get this shot when it's due.  Flu shot: Get a flu shot every year.  Tetanus shot: Get a tetanus shot every 10 years.  Pneumococcal, hepatitis A, and RSV shots: Ask your care team if you need these based on your risk.  Shingles shot (for age 50 and up)  General health tests  Diabetes screening:  Starting at age 35, Get screened for diabetes at least every 3 years.  If you are younger than age 35, ask your care team if you should be screened for diabetes.  Cholesterol test: At age 39, start having a cholesterol test every 5 years, or more often if advised.  Bone density scan (DEXA): At age 50, ask your care team if you should have this scan for osteoporosis (brittle bones).  Hepatitis C: Get tested at least once in your life.  STIs (sexually  transmitted infections)  Before age 24: Ask your care team if you should be screened for STIs.  After age 24: Get screened for STIs if you're at risk. You are at risk for STIs (including HIV) if:  You are sexually active with more than one person.  You don't use condoms every time.  You or a partner was diagnosed with a sexually transmitted infection.  If you are at risk for HIV, ask about PrEP medicine to prevent HIV.  Get tested for HIV at least once in your life, whether you are at risk for HIV or not.  Cancer screening tests  Cervical cancer screening: If you have a cervix, begin getting regular cervical cancer screening tests starting at age 21.  Breast cancer scan (mammogram): If you've ever had breasts, begin having regular mammograms starting at age 40. This is a scan to check for breast cancer.  Colon cancer screening: It is important to start screening for colon cancer at age 45.  Have a colonoscopy test every 10 years (or more often if you're at risk) Or, ask your provider about stool tests like a FIT test every year or Cologuard test every 3 years.  To learn more about your testing options, visit:   .  For help making a decision, visit:   https://bit.ly/dw07850.  Prostate cancer screening test: If you have a prostate, ask your care team if a prostate cancer screening test (PSA) at age 55 is right for you.  Lung cancer screening: If you are a current or former smoker ages 50 to 80, ask your care team if ongoing lung cancer screenings are right for you.  For informational purposes only. Not to replace the advice of your health care provider. Copyright   2023 [employer]. All rights reserved. Clinically reviewed by the [facility] Transitions Program. [employer] [id number] - REV 01/24.

## 2025-04-01 ENCOUNTER — MEDICAL CORRESPONDENCE (OUTPATIENT)
Dept: HEALTH INFORMATION MANAGEMENT | Facility: CLINIC | Age: 85
End: 2025-04-01
Payer: COMMERCIAL

## 2025-06-03 DIAGNOSIS — E83.52 HYPERCALCEMIA: ICD-10-CM

## 2025-06-03 DIAGNOSIS — N18.32 STAGE 3B CHRONIC KIDNEY DISEASE (H): Primary | ICD-10-CM

## 2025-06-25 DIAGNOSIS — M1A.9XX0 CHRONIC GOUT WITHOUT TOPHUS, UNSPECIFIED CAUSE, UNSPECIFIED SITE: ICD-10-CM

## 2025-06-30 RX ORDER — ALLOPURINOL 100 MG/1
100 TABLET ORAL DAILY
Qty: 90 TABLET | Refills: 1 | Status: SHIPPED | OUTPATIENT
Start: 2025-06-30

## 2025-07-25 ENCOUNTER — MYC MEDICAL ADVICE (OUTPATIENT)
Dept: FAMILY MEDICINE | Facility: CLINIC | Age: 85
End: 2025-07-25
Payer: COMMERCIAL

## 2025-07-25 DIAGNOSIS — I10 HYPERTENSION GOAL BP (BLOOD PRESSURE) < 140/90: Primary | ICD-10-CM

## 2025-07-25 NOTE — TELEPHONE ENCOUNTER
Patient reporting that Nephrologist, Dr. Hackett, recommending decreased chlorthalidone dose:        LOV 3/27/25 w/ Dr. Martinez  Recommendations:        Chlorthalidone 25mg  Once daily  #90  LF 12/6/24

## 2025-07-25 NOTE — TELEPHONE ENCOUNTER
Spoke with patient, has been taking chlorthalidone 12.5mg daily as recommended at Nephrology clinic.     Patient does not currently take regular home blood pressure readings, recommended that he take a daily reading and keep a log x1 week. Patient will report these readings to clinic next week to ensure adequate blood pressure control at home with decreased dose.    Patient requesting new prescription be sent to [facility] for chlorthalidone 12.5mg daily so that he does not have to cut tablets anymore - order pended for provider review

## 2025-07-31 DIAGNOSIS — I10 HYPERTENSION GOAL BP (BLOOD PRESSURE) < 140/90: ICD-10-CM

## 2025-07-31 RX ORDER — CHLORTHALIDONE 25 MG/1
25 TABLET ORAL DAILY
Qty: 90 TABLET | Refills: 0 | Status: SHIPPED | OUTPATIENT
Start: 2025-07-31

## 2025-08-01 ENCOUNTER — TELEPHONE (OUTPATIENT)
Dept: FAMILY MEDICINE | Facility: CLINIC | Age: 85
End: 2025-08-01
Payer: COMMERCIAL

## 2025-08-01 DIAGNOSIS — I10 HYPERTENSION GOAL BP (BLOOD PRESSURE) < 140/90: Primary | ICD-10-CM

## 2025-08-11 SDOH — HEALTH STABILITY: PHYSICAL HEALTH: ON AVERAGE, HOW MANY DAYS PER WEEK DO YOU ENGAGE IN MODERATE TO STRENUOUS EXERCISE (LIKE A BRISK WALK)?: 0 DAYS

## 2025-08-11 ASSESSMENT — SOCIAL DETERMINANTS OF HEALTH (SDOH): HOW OFTEN DO YOU GET TOGETHER WITH FRIENDS OR RELATIVES?: THREE TIMES A WEEK

## 2025-08-14 ENCOUNTER — OFFICE VISIT (OUTPATIENT)
Dept: FAMILY MEDICINE | Facility: CLINIC | Age: 85
End: 2025-08-14
Attending: FAMILY MEDICINE
Payer: COMMERCIAL

## 2025-08-14 VITALS
HEIGHT: 67 IN | WEIGHT: 204.4 LBS | TEMPERATURE: 97.7 F | DIASTOLIC BLOOD PRESSURE: 73 MMHG | BODY MASS INDEX: 32.08 KG/M2 | HEART RATE: 59 BPM | SYSTOLIC BLOOD PRESSURE: 148 MMHG | RESPIRATION RATE: 18 BRPM | OXYGEN SATURATION: 98 %

## 2025-08-14 DIAGNOSIS — C90.01 MULTIPLE MYELOMA IN REMISSION (H): ICD-10-CM

## 2025-08-14 DIAGNOSIS — Z00.00 ENCOUNTER FOR MEDICARE ANNUAL WELLNESS EXAM: ICD-10-CM

## 2025-08-14 DIAGNOSIS — N18.31 TYPE 2 DIABETES MELLITUS WITH STAGE 3A CHRONIC KIDNEY DISEASE, WITHOUT LONG-TERM CURRENT USE OF INSULIN (H): ICD-10-CM

## 2025-08-14 DIAGNOSIS — E11.22 TYPE 2 DIABETES MELLITUS WITH STAGE 3A CHRONIC KIDNEY DISEASE, WITHOUT LONG-TERM CURRENT USE OF INSULIN (H): ICD-10-CM

## 2025-08-14 DIAGNOSIS — M1A.9XX0 CHRONIC GOUT WITHOUT TOPHUS, UNSPECIFIED CAUSE, UNSPECIFIED SITE: ICD-10-CM

## 2025-08-14 DIAGNOSIS — Z00.00 WELL ADULT EXAM: Primary | ICD-10-CM

## 2025-08-14 LAB
EST. AVERAGE GLUCOSE BLD GHB EST-MCNC: 126 MG/DL
HBA1C MFR BLD: 6 % (ref 0–5.6)
URATE SERPL-MCNC: 6 MG/DL (ref 3.4–7)

## (undated) DEVICE — SYR 03ML LL W/O NDL

## (undated) DEVICE — GLOVE PROTEXIS W/NEU-THERA 7.5  2D73TE75

## (undated) DEVICE — PACK MINOR EYE

## (undated) DEVICE — MARKER SKIN DOUBLE TIP W/FLEXI-RULER W/LABELS

## (undated) DEVICE — ESU ELEC NDL 1" COATED/INSULATED E1465

## (undated) DEVICE — NDL 30GA 0.5" 305106

## (undated) DEVICE — ESU EYE HIGH TEMP 65410-183

## (undated) DEVICE — SOL WATER IRRIG 1000ML BOTTLE 07139-09

## (undated) RX ORDER — FENTANYL CITRATE 50 UG/ML
INJECTION, SOLUTION INTRAMUSCULAR; INTRAVENOUS
Status: DISPENSED
Start: 2022-03-21

## (undated) RX ORDER — ACETAMINOPHEN 325 MG/1
TABLET ORAL
Status: DISPENSED
Start: 2022-03-21